# Patient Record
Sex: FEMALE | Race: WHITE | Employment: UNEMPLOYED | URBAN - METROPOLITAN AREA
[De-identification: names, ages, dates, MRNs, and addresses within clinical notes are randomized per-mention and may not be internally consistent; named-entity substitution may affect disease eponyms.]

---

## 2017-02-13 PROBLEM — J45.901 ASTHMA EXACERBATION WITH COPD (CHRONIC OBSTRUCTIVE PULMONARY DISEASE) (HCC): Status: ACTIVE | Noted: 2017-02-13

## 2017-02-13 PROBLEM — J44.1 ASTHMA EXACERBATION WITH COPD (CHRONIC OBSTRUCTIVE PULMONARY DISEASE) (HCC): Status: ACTIVE | Noted: 2017-02-13

## 2017-02-13 PROBLEM — J10.1 INFLUENZA A: Status: ACTIVE | Noted: 2017-02-13

## 2017-02-22 PROBLEM — I10 ESSENTIAL HYPERTENSION: Status: ACTIVE | Noted: 2017-02-22

## 2017-02-22 PROBLEM — M54.2 CHRONIC NECK PAIN: Status: ACTIVE | Noted: 2017-02-22

## 2017-02-22 PROBLEM — Z72.0 TOBACCO USE: Status: ACTIVE | Noted: 2017-02-22

## 2017-02-22 PROBLEM — G43.909 MIGRAINE WITHOUT STATUS MIGRAINOSUS, NOT INTRACTABLE: Status: ACTIVE | Noted: 2017-02-22

## 2017-02-22 PROBLEM — G89.29 CHRONIC NECK PAIN: Status: ACTIVE | Noted: 2017-02-22

## 2017-02-22 PROBLEM — K21.9 GASTROESOPHAGEAL REFLUX DISEASE: Status: ACTIVE | Noted: 2017-02-22

## 2017-04-27 PROBLEM — E55.9 VITAMIN D DEFICIENCY: Status: ACTIVE | Noted: 2017-04-27

## 2017-04-27 PROBLEM — E61.1 IRON DEFICIENCY: Status: ACTIVE | Noted: 2017-04-27

## 2017-05-09 PROBLEM — K58.1 IRRITABLE BOWEL SYNDROME WITH CONSTIPATION: Status: ACTIVE | Noted: 2017-05-09

## 2017-08-22 PROBLEM — E89.41 HOT FLASHES DUE TO SURGICAL MENOPAUSE: Status: ACTIVE | Noted: 2017-08-22

## 2017-11-09 PROBLEM — G90.A POTS (POSTURAL ORTHOSTATIC TACHYCARDIA SYNDROME): Status: ACTIVE | Noted: 2017-11-09

## 2017-11-15 PROBLEM — R19.7 DIARRHEA: Status: ACTIVE | Noted: 2017-11-15

## 2017-11-15 PROBLEM — Z72.0 TOBACCO USE: Status: ACTIVE | Noted: 2017-11-15

## 2017-11-15 PROBLEM — R11.2 NAUSEA & VOMITING: Status: ACTIVE | Noted: 2017-11-15

## 2017-11-15 PROBLEM — G90.A POTS (POSTURAL ORTHOSTATIC TACHYCARDIA SYNDROME): Status: ACTIVE | Noted: 2017-11-15

## 2018-03-14 ENCOUNTER — TELEPHONE (OUTPATIENT)
Dept: FAMILY MEDICINE CLINIC | Age: 43
End: 2018-03-14

## 2018-03-29 ENCOUNTER — TELEPHONE (OUTPATIENT)
Dept: FAMILY MEDICINE CLINIC | Age: 43
End: 2018-03-29

## 2018-03-29 DIAGNOSIS — G43.909 MIGRAINE WITHOUT STATUS MIGRAINOSUS, NOT INTRACTABLE, UNSPECIFIED MIGRAINE TYPE: ICD-10-CM

## 2018-03-29 DIAGNOSIS — G43.909 MIGRAINE WITHOUT STATUS MIGRAINOSUS, NOT INTRACTABLE, UNSPECIFIED MIGRAINE TYPE: Primary | ICD-10-CM

## 2018-03-29 RX ORDER — SUMATRIPTAN 25 MG/1
25 TABLET, FILM COATED ORAL
Qty: 8 TABLET | Refills: 1 | Status: SHIPPED | OUTPATIENT
Start: 2018-03-29 | End: 2018-10-25 | Stop reason: ALTCHOICE

## 2018-04-18 ENCOUNTER — OFFICE VISIT (OUTPATIENT)
Dept: FAMILY MEDICINE CLINIC | Age: 43
End: 2018-04-18
Payer: COMMERCIAL

## 2018-04-18 VITALS
HEIGHT: 66 IN | HEART RATE: 149 BPM | WEIGHT: 183 LBS | OXYGEN SATURATION: 97 % | SYSTOLIC BLOOD PRESSURE: 120 MMHG | TEMPERATURE: 98.7 F | BODY MASS INDEX: 29.41 KG/M2 | DIASTOLIC BLOOD PRESSURE: 86 MMHG

## 2018-04-18 DIAGNOSIS — M54.42 CHRONIC LOW BACK PAIN WITH BILATERAL SCIATICA, UNSPECIFIED BACK PAIN LATERALITY: Chronic | ICD-10-CM

## 2018-04-18 DIAGNOSIS — M54.41 CHRONIC LOW BACK PAIN WITH BILATERAL SCIATICA, UNSPECIFIED BACK PAIN LATERALITY: Chronic | ICD-10-CM

## 2018-04-18 DIAGNOSIS — E55.9 VITAMIN D DEFICIENCY: ICD-10-CM

## 2018-04-18 DIAGNOSIS — R09.81 SINUS CONGESTION: ICD-10-CM

## 2018-04-18 DIAGNOSIS — G90.A POTS (POSTURAL ORTHOSTATIC TACHYCARDIA SYNDROME): Primary | ICD-10-CM

## 2018-04-18 DIAGNOSIS — G89.29 CHRONIC LOW BACK PAIN WITH BILATERAL SCIATICA, UNSPECIFIED BACK PAIN LATERALITY: Chronic | ICD-10-CM

## 2018-04-18 DIAGNOSIS — Z13.1 DIABETES MELLITUS SCREENING: ICD-10-CM

## 2018-04-18 DIAGNOSIS — G43.909 MIGRAINE WITHOUT STATUS MIGRAINOSUS, NOT INTRACTABLE, UNSPECIFIED MIGRAINE TYPE: ICD-10-CM

## 2018-04-18 PROCEDURE — G8417 CALC BMI ABV UP PARAM F/U: HCPCS | Performed by: FAMILY MEDICINE

## 2018-04-18 PROCEDURE — 99214 OFFICE O/P EST MOD 30 MIN: CPT | Performed by: FAMILY MEDICINE

## 2018-04-18 PROCEDURE — G8427 DOCREV CUR MEDS BY ELIG CLIN: HCPCS | Performed by: FAMILY MEDICINE

## 2018-04-18 PROCEDURE — 4004F PT TOBACCO SCREEN RCVD TLK: CPT | Performed by: FAMILY MEDICINE

## 2018-04-18 RX ORDER — FLUTICASONE PROPIONATE 50 MCG
SPRAY, SUSPENSION (ML) NASAL
Qty: 1 BOTTLE | Refills: 1 | Status: SHIPPED | OUTPATIENT
Start: 2018-04-18 | End: 2019-06-13

## 2018-04-18 RX ORDER — PROPRANOLOL HCL 60 MG
60 CAPSULE, EXTENDED RELEASE 24HR ORAL DAILY
Qty: 30 CAPSULE | Refills: 3 | Status: SHIPPED | OUTPATIENT
Start: 2018-04-18 | End: 2018-09-06 | Stop reason: SDUPTHER

## 2018-04-23 ENCOUNTER — TELEPHONE (OUTPATIENT)
Dept: FAMILY MEDICINE CLINIC | Age: 43
End: 2018-04-23

## 2018-05-25 ENCOUNTER — TELEPHONE (OUTPATIENT)
Dept: FAMILY MEDICINE CLINIC | Age: 43
End: 2018-05-25

## 2018-07-05 ENCOUNTER — TELEPHONE (OUTPATIENT)
Dept: FAMILY MEDICINE CLINIC | Age: 43
End: 2018-07-05

## 2018-08-16 ENCOUNTER — TELEPHONE (OUTPATIENT)
Dept: FAMILY MEDICINE CLINIC | Age: 43
End: 2018-08-16

## 2018-09-06 DIAGNOSIS — G90.A POTS (POSTURAL ORTHOSTATIC TACHYCARDIA SYNDROME): ICD-10-CM

## 2018-09-06 RX ORDER — PROPRANOLOL HCL 60 MG
60 CAPSULE, EXTENDED RELEASE 24HR ORAL DAILY
Qty: 90 CAPSULE | Refills: 1 | Status: SHIPPED | OUTPATIENT
Start: 2018-09-06 | End: 2019-10-02 | Stop reason: SDUPTHER

## 2018-09-26 PROBLEM — J10.1 INFLUENZA A: Status: RESOLVED | Noted: 2017-02-13 | Resolved: 2018-09-26

## 2018-10-25 ENCOUNTER — HOSPITAL ENCOUNTER (OUTPATIENT)
Age: 43
Discharge: HOME | End: 2018-10-27
Payer: COMMERCIAL

## 2018-10-25 ENCOUNTER — OFFICE VISIT (OUTPATIENT)
Dept: FAMILY MEDICINE CLINIC | Age: 43
End: 2018-10-25
Payer: COMMERCIAL

## 2018-10-25 VITALS
DIASTOLIC BLOOD PRESSURE: 90 MMHG | TEMPERATURE: 98.1 F | HEIGHT: 66 IN | BODY MASS INDEX: 29.25 KG/M2 | SYSTOLIC BLOOD PRESSURE: 126 MMHG | HEART RATE: 119 BPM | OXYGEN SATURATION: 98 % | WEIGHT: 182 LBS

## 2018-10-25 DIAGNOSIS — R79.0 LOW BLOOD MAGNESIUM LEVEL: ICD-10-CM

## 2018-10-25 DIAGNOSIS — Z86.69 HX OF MIGRAINES: ICD-10-CM

## 2018-10-25 DIAGNOSIS — Z86.2 HISTORY OF IRON DEFICIENCY ANEMIA: ICD-10-CM

## 2018-10-25 DIAGNOSIS — Z72.0 TOBACCO USE: ICD-10-CM

## 2018-10-25 DIAGNOSIS — R53.82 CHRONIC FATIGUE: Primary | ICD-10-CM

## 2018-10-25 DIAGNOSIS — R53.82 CHRONIC FATIGUE: ICD-10-CM

## 2018-10-25 DIAGNOSIS — Z13.31 POSITIVE DEPRESSION SCREENING: ICD-10-CM

## 2018-10-25 DIAGNOSIS — E55.9 VITAMIN D DEFICIENCY: ICD-10-CM

## 2018-10-25 DIAGNOSIS — Z13.1 DIABETES MELLITUS SCREENING: ICD-10-CM

## 2018-10-25 DIAGNOSIS — R13.10 DYSPHAGIA, UNSPECIFIED TYPE: ICD-10-CM

## 2018-10-25 LAB
ALBUMIN SERPL-MCNC: 4.1 G/DL (ref 3.5–5.2)
ALP BLD-CCNC: 57 U/L (ref 35–104)
ALT SERPL-CCNC: 22 U/L (ref 0–32)
ANION GAP SERPL CALCULATED.3IONS-SCNC: 12 MMOL/L (ref 7–16)
AST SERPL-CCNC: 19 U/L (ref 0–31)
BILIRUB SERPL-MCNC: <0.2 MG/DL (ref 0–1.2)
BILIRUBIN DIRECT: <0.2 MG/DL (ref 0–0.3)
BILIRUBIN, INDIRECT: NORMAL MG/DL (ref 0–1)
BUN BLDV-MCNC: 10 MG/DL (ref 6–20)
CALCIUM SERPL-MCNC: 9.2 MG/DL (ref 8.6–10.2)
CHLORIDE BLD-SCNC: 110 MMOL/L (ref 98–107)
CO2: 20 MMOL/L (ref 22–29)
CREAT SERPL-MCNC: 0.8 MG/DL (ref 0.5–1)
FERRITIN: 60 NG/ML
FOLATE: <2 NG/ML (ref 4.8–24.2)
GFR AFRICAN AMERICAN: >60
GFR NON-AFRICAN AMERICAN: >60 ML/MIN/1.73
GLUCOSE BLD-MCNC: 91 MG/DL (ref 74–109)
HBA1C MFR BLD: 5.4 % (ref 4–5.6)
HCT VFR BLD CALC: 46.5 % (ref 34–48)
HEMOGLOBIN: 15.2 G/DL (ref 11.5–15.5)
IRON SATURATION: 31 % (ref 15–50)
IRON: 98 MCG/DL (ref 37–145)
MAGNESIUM: 2.2 MG/DL (ref 1.6–2.6)
MCH RBC QN AUTO: 32.5 PG (ref 26–35)
MCHC RBC AUTO-ENTMCNC: 32.7 % (ref 32–34.5)
MCV RBC AUTO: 99.6 FL (ref 80–99.9)
PDW BLD-RTO: 13.6 FL (ref 11.5–15)
PLATELET # BLD: 335 E9/L (ref 130–450)
PMV BLD AUTO: 10.9 FL (ref 7–12)
POTASSIUM SERPL-SCNC: 3.6 MMOL/L (ref 3.5–5)
RBC # BLD: 4.67 E12/L (ref 3.5–5.5)
SODIUM BLD-SCNC: 142 MMOL/L (ref 132–146)
TOTAL IRON BINDING CAPACITY: 321 MCG/DL (ref 250–450)
TOTAL PROTEIN: 6.7 G/DL (ref 6.4–8.3)
TSH SERPL DL<=0.05 MIU/L-ACNC: 1.38 UIU/ML (ref 0.27–4.2)
VITAMIN B-12: 682 PG/ML (ref 211–946)
VITAMIN D 25-HYDROXY: 15 NG/ML (ref 30–100)
WBC # BLD: 6.1 E9/L (ref 4.5–11.5)

## 2018-10-25 PROCEDURE — 83735 ASSAY OF MAGNESIUM: CPT

## 2018-10-25 PROCEDURE — 85027 COMPLETE CBC AUTOMATED: CPT

## 2018-10-25 PROCEDURE — G8427 DOCREV CUR MEDS BY ELIG CLIN: HCPCS | Performed by: FAMILY MEDICINE

## 2018-10-25 PROCEDURE — G8417 CALC BMI ABV UP PARAM F/U: HCPCS | Performed by: FAMILY MEDICINE

## 2018-10-25 PROCEDURE — 80076 HEPATIC FUNCTION PANEL: CPT

## 2018-10-25 PROCEDURE — 84443 ASSAY THYROID STIM HORMONE: CPT

## 2018-10-25 PROCEDURE — 82607 VITAMIN B-12: CPT

## 2018-10-25 PROCEDURE — 82746 ASSAY OF FOLIC ACID SERUM: CPT

## 2018-10-25 PROCEDURE — 82728 ASSAY OF FERRITIN: CPT

## 2018-10-25 PROCEDURE — 83540 ASSAY OF IRON: CPT

## 2018-10-25 PROCEDURE — 80048 BASIC METABOLIC PNL TOTAL CA: CPT

## 2018-10-25 PROCEDURE — 83550 IRON BINDING TEST: CPT

## 2018-10-25 PROCEDURE — 36415 COLL VENOUS BLD VENIPUNCTURE: CPT | Performed by: FAMILY MEDICINE

## 2018-10-25 PROCEDURE — G8484 FLU IMMUNIZE NO ADMIN: HCPCS | Performed by: FAMILY MEDICINE

## 2018-10-25 PROCEDURE — 4004F PT TOBACCO SCREEN RCVD TLK: CPT | Performed by: FAMILY MEDICINE

## 2018-10-25 PROCEDURE — 96160 PT-FOCUSED HLTH RISK ASSMT: CPT | Performed by: FAMILY MEDICINE

## 2018-10-25 PROCEDURE — 99214 OFFICE O/P EST MOD 30 MIN: CPT | Performed by: FAMILY MEDICINE

## 2018-10-25 PROCEDURE — 83036 HEMOGLOBIN GLYCOSYLATED A1C: CPT

## 2018-10-25 PROCEDURE — G8431 POS CLIN DEPRES SCRN F/U DOC: HCPCS | Performed by: FAMILY MEDICINE

## 2018-10-25 PROCEDURE — 82306 VITAMIN D 25 HYDROXY: CPT

## 2018-10-25 RX ORDER — DEXLANSOPRAZOLE 60 MG/1
60 CAPSULE, DELAYED RELEASE ORAL DAILY
COMMUNITY
End: 2018-12-17 | Stop reason: SDUPTHER

## 2018-10-25 RX ORDER — NARATRIPTAN 2.5 MG/1
2.5 TABLET ORAL
COMMUNITY
End: 2019-01-06 | Stop reason: ALTCHOICE

## 2018-10-25 RX ORDER — ALENDRONATE SODIUM 70 MG/1
70 TABLET ORAL
COMMUNITY
End: 2021-06-07

## 2018-10-25 RX ORDER — NICOTINE 21 MG/24HR
1 PATCH, TRANSDERMAL 24 HOURS TRANSDERMAL EVERY 24 HOURS
Qty: 30 PATCH | Refills: 1 | Status: SHIPPED | OUTPATIENT
Start: 2018-10-25 | End: 2019-06-13

## 2018-10-25 ASSESSMENT — PATIENT HEALTH QUESTIONNAIRE - PHQ9
4. FEELING TIRED OR HAVING LITTLE ENERGY: 3
3. TROUBLE FALLING OR STAYING ASLEEP: 3
8. MOVING OR SPEAKING SO SLOWLY THAT OTHER PEOPLE COULD HAVE NOTICED. OR THE OPPOSITE, BEING SO FIGETY OR RESTLESS THAT YOU HAVE BEEN MOVING AROUND A LOT MORE THAN USUAL: 0
6. FEELING BAD ABOUT YOURSELF - OR THAT YOU ARE A FAILURE OR HAVE LET YOURSELF OR YOUR FAMILY DOWN: 1
2. FEELING DOWN, DEPRESSED OR HOPELESS: 2
9. THOUGHTS THAT YOU WOULD BE BETTER OFF DEAD, OR OF HURTING YOURSELF: 0
5. POOR APPETITE OR OVEREATING: 2
7. TROUBLE CONCENTRATING ON THINGS, SUCH AS READING THE NEWSPAPER OR WATCHING TELEVISION: 2
10. IF YOU CHECKED OFF ANY PROBLEMS, HOW DIFFICULT HAVE THESE PROBLEMS MADE IT FOR YOU TO DO YOUR WORK, TAKE CARE OF THINGS AT HOME, OR GET ALONG WITH OTHER PEOPLE: 2
SUM OF ALL RESPONSES TO PHQ QUESTIONS 1-9: 15
SUM OF ALL RESPONSES TO PHQ QUESTIONS 1-9: 15
1. LITTLE INTEREST OR PLEASURE IN DOING THINGS: 2
SUM OF ALL RESPONSES TO PHQ9 QUESTIONS 1 & 2: 4

## 2018-10-25 NOTE — PROGRESS NOTES
2/13/2017    C. difficile colitis     Endometriosis     Essential hypertension 2/22/2017    Fibromyalgia     GERD (gastroesophageal reflux disease)     Influenza A 2/13/2017    Interstitial lung disease (HCC)     Malabsorption syndrome     Migraine     Narcotic abuse (HCC)     Neuromuscular disorder (HCC)     Ovarian cyst     Pancreatitis     POTS (postural orthostatic tachycardia syndrome)     Psychiatric problem     [facility] arthritis [facility] [address]     Seizures ([facility] [address]     due to Ultram per pt    Systemic candidiasis [facility])        Past Surgical History:        Procedure Laterality Date    ABDOMEN SURGERY  3/5/12    endometreosis    APPENDECTOMY      BRONCHOSCOPY  11/01/2016    BRONCHOSCOPY  12/14/2016    CHOLECYSTECTOMY  02/25/2016    lap    COLONOSCOPY      ENDOMETRIAL ABLATION      ENDOSCOPY, COLON, DIAGNOSTIC      HYSTERECTOMY      SMALL INTESTINE SURGERY      related to endometriosis    MARCELINO AND BSO      TONSILLECTOMY         Allergies:  Tramadol; Morphine; Topiramate; Codeine; Demerol; Influenza vaccines; Nsaids; Prochlorperazine edisylate; Trazodone and nefazodone; Casein; Eggs or egg-derived products; Gluten meal; Peanuts [peanut oil]; and Whey    Social History:   TOBACCO:   reports that she has been smoking. She started smoking about 14 years ago. She has been smoking about 0.50 packs per day. She has never used smokeless tobacco.  ETOH:   reports that she does not drink alcohol.       Family History:   Family History   Problem Relation Age of Onset    High Blood Pressure Father     High Cholesterol Father     High Blood Pressure Mother     No Known Problems Sister        REVIEW OF SYSTEMS:     Review of Systems - General ROS: negative for - fever  +fatigue, temp ranges  F   Psychological ROS: positive for - depression  negative for - suicidal ideation  ENT ROS: negative for - nasal congestion, nasal discharge or sinus pain  Hematological and Lymphatic ROS: she has frequent bruising - states this is not new for her   Has not returned to the hematologist for follow-up   Respiratory ROS: no cough, shortness of breath, or wheezing  Cardiovascular ROS: no chest pain or dyspnea on exertion  Gastrointestinal ROS: no abdominal pain, change in bowel habits, or black or bloody stools  positive for - swallowing difficulty/pain  Genito-Urinary ROS: no dysuria, trouble voiding, or hematuria  Neurological ROS: +chronic migraines         Physical Exam   BP (!) 126/90 (Site: Left Upper Arm, Position: Sitting, Cuff Size: Large Adult)   Pulse 119   Temp 98.1 °F (36.7 °C) (Oral)   Ht 5' 6\" (1.676 m)   Wt 182 lb (82.6 kg)   SpO2 98%   BMI 29.38 kg/m²   Physical Examination: General appearance - alert, pleasant, no distress   Mental status -affect appropriate   Head- normocephalic   Neck - no palpable adenopathy, no tenderness to palpation   Eyes - pupils equal and reactive  Ears - no injection of the canals or the TM's   Mouth-mucosa was moist, no lesions of the mucosa, no pharyngeal injection or exudates    Heart-regular rate and rhythm with no ectopy  Lungs-clear to auscultation b/l with no wheeze, no rales and no rhonchi    Abdomen -soft with subjective tenderness in the upper abdomen -(she feels may be due previous surgeries and is not new)    No palpable masses   Extremities -no edema     Mandi was seen today for an annual exam and fatigue. Diagnoses and all orders for this visit:    Chronic fatigue  -     Basic Metabolic Panel; Future  -     Hepatic Function Panel; Future  -     TSH without Reflex; Future  -     Vitamin B12 & Folate; Future    Dysphagia, unspecified type  -     FL MODIFIED BARIUM SWALLOW W VIDEO; Future    Positive depression screening  -     Positive Screen for Clinical Depression with a Documented Follow-up Plan   She denied any suicidal ideation or plan and has a counselor she sees for treatment     Tobacco use  -     nicotine (NICODERM CQ) 14 MG/24HR;  Place 1 patch

## 2018-10-26 DIAGNOSIS — E53.8 FOLATE DEFICIENCY: Primary | ICD-10-CM

## 2018-10-26 RX ORDER — FOLIC ACID 1 MG/1
1 TABLET ORAL DAILY
Qty: 30 TABLET | Refills: 3 | Status: SHIPPED | OUTPATIENT
Start: 2018-10-26 | End: 2019-07-02 | Stop reason: SDUPTHER

## 2018-12-06 ENCOUNTER — TELEPHONE (OUTPATIENT)
Dept: FAMILY MEDICINE CLINIC | Age: 43
End: 2018-12-06

## 2018-12-06 DIAGNOSIS — M79.7 FIBROMYALGIA: Primary | ICD-10-CM

## 2018-12-17 ENCOUNTER — TELEPHONE (OUTPATIENT)
Dept: FAMILY MEDICINE CLINIC | Age: 43
End: 2018-12-17

## 2018-12-17 DIAGNOSIS — K21.9 GASTROESOPHAGEAL REFLUX DISEASE, ESOPHAGITIS PRESENCE NOT SPECIFIED: Primary | Chronic | ICD-10-CM

## 2018-12-17 RX ORDER — DEXLANSOPRAZOLE 60 MG/1
60 CAPSULE, DELAYED RELEASE ORAL DAILY
Qty: 30 CAPSULE | Refills: 1 | Status: SHIPPED | OUTPATIENT
Start: 2018-12-17

## 2018-12-18 ENCOUNTER — HOSPITAL ENCOUNTER (EMERGENCY)
Age: 43
Discharge: HOME | End: 2018-12-18
Attending: EMERGENCY MEDICINE
Payer: COMMERCIAL

## 2018-12-18 ENCOUNTER — APPOINTMENT (OUTPATIENT)
Dept: CT IMAGING | Age: 43
End: 2018-12-18
Payer: COMMERCIAL

## 2018-12-18 VITALS
OXYGEN SATURATION: 93 % | HEART RATE: 83 BPM | TEMPERATURE: 98.5 F | WEIGHT: 175 LBS | RESPIRATION RATE: 18 BRPM | HEIGHT: 65 IN | DIASTOLIC BLOOD PRESSURE: 74 MMHG | SYSTOLIC BLOOD PRESSURE: 116 MMHG | BODY MASS INDEX: 29.16 KG/M2

## 2018-12-18 DIAGNOSIS — K56.7 ILEUS OF UNSPECIFIED TYPE (HCC): ICD-10-CM

## 2018-12-18 DIAGNOSIS — E86.0 DEHYDRATION: ICD-10-CM

## 2018-12-18 DIAGNOSIS — R10.11 RIGHT UPPER QUADRANT ABDOMINAL PAIN: Primary | ICD-10-CM

## 2018-12-18 LAB
ALBUMIN SERPL-MCNC: 4 G/DL (ref 3.5–5.2)
ALP BLD-CCNC: 56 U/L (ref 35–104)
ALT SERPL-CCNC: 18 U/L (ref 0–32)
ANION GAP SERPL CALCULATED.3IONS-SCNC: 13 MMOL/L (ref 7–16)
AST SERPL-CCNC: 20 U/L (ref 0–31)
BASOPHILS ABSOLUTE: 0.1 E9/L (ref 0–0.2)
BASOPHILS RELATIVE PERCENT: 1.7 % (ref 0–2)
BILIRUB SERPL-MCNC: <0.2 MG/DL (ref 0–1.2)
BILIRUBIN DIRECT: <0.2 MG/DL (ref 0–0.3)
BILIRUBIN URINE: NEGATIVE
BILIRUBIN, INDIRECT: ABNORMAL MG/DL (ref 0–1)
BLOOD, URINE: NEGATIVE
BUN BLDV-MCNC: 11 MG/DL (ref 6–20)
CALCIUM SERPL-MCNC: 8.5 MG/DL (ref 8.6–10.2)
CHLORIDE BLD-SCNC: 105 MMOL/L (ref 98–107)
CLARITY: CLEAR
CO2: 20 MMOL/L (ref 22–29)
COLOR: YELLOW
CREAT SERPL-MCNC: 0.7 MG/DL (ref 0.5–1)
EOSINOPHILS ABSOLUTE: 0.31 E9/L (ref 0.05–0.5)
EOSINOPHILS RELATIVE PERCENT: 5.3 % (ref 0–6)
GFR AFRICAN AMERICAN: >60
GFR NON-AFRICAN AMERICAN: >60 ML/MIN/1.73
GLUCOSE BLD-MCNC: 90 MG/DL (ref 74–99)
GLUCOSE URINE: NEGATIVE MG/DL
HCT VFR BLD CALC: 46.9 % (ref 34–48)
HEMOGLOBIN: 15.9 G/DL (ref 11.5–15.5)
IMMATURE GRANULOCYTES #: 0.02 E9/L
IMMATURE GRANULOCYTES %: 0.3 % (ref 0–5)
INR BLD: 0.8
KETONES, URINE: ABNORMAL MG/DL
LEUKOCYTE ESTERASE, URINE: NEGATIVE
LIPASE: 58 U/L (ref 13–60)
LYMPHOCYTES ABSOLUTE: 2.95 E9/L (ref 1.5–4)
LYMPHOCYTES RELATIVE PERCENT: 50.3 % (ref 20–42)
MCH RBC QN AUTO: 32.9 PG (ref 26–35)
MCHC RBC AUTO-ENTMCNC: 33.9 % (ref 32–34.5)
MCV RBC AUTO: 97.1 FL (ref 80–99.9)
MONOCYTES ABSOLUTE: 0.57 E9/L (ref 0.1–0.95)
MONOCYTES RELATIVE PERCENT: 9.7 % (ref 2–12)
NEUTROPHILS ABSOLUTE: 1.91 E9/L (ref 1.8–7.3)
NEUTROPHILS RELATIVE PERCENT: 32.7 % (ref 43–80)
NITRITE, URINE: NEGATIVE
PDW BLD-RTO: 13.3 FL (ref 11.5–15)
PH UA: 6 (ref 5–9)
PLATELET # BLD: 333 E9/L (ref 130–450)
PMV BLD AUTO: 10.1 FL (ref 7–12)
POTASSIUM SERPL-SCNC: 3.9 MMOL/L (ref 3.5–5)
PROTEIN UA: NEGATIVE MG/DL
PROTHROMBIN TIME: 9.7 SEC (ref 9.3–12.4)
RBC # BLD: 4.83 E12/L (ref 3.5–5.5)
SODIUM BLD-SCNC: 138 MMOL/L (ref 132–146)
SPECIFIC GRAVITY UA: 1.02 (ref 1–1.03)
TOTAL PROTEIN: 6 G/DL (ref 6.4–8.3)
UROBILINOGEN, URINE: 0.2 E.U./DL
WBC # BLD: 5.9 E9/L (ref 4.5–11.5)

## 2018-12-18 PROCEDURE — 96376 TX/PRO/DX INJ SAME DRUG ADON: CPT

## 2018-12-18 PROCEDURE — 80076 HEPATIC FUNCTION PANEL: CPT

## 2018-12-18 PROCEDURE — 96374 THER/PROPH/DIAG INJ IV PUSH: CPT

## 2018-12-18 PROCEDURE — 85610 PROTHROMBIN TIME: CPT

## 2018-12-18 PROCEDURE — 81003 URINALYSIS AUTO W/O SCOPE: CPT

## 2018-12-18 PROCEDURE — 87088 URINE BACTERIA CULTURE: CPT

## 2018-12-18 PROCEDURE — 2580000003 HC RX 258: Performed by: STUDENT IN AN ORGANIZED HEALTH CARE EDUCATION/TRAINING PROGRAM

## 2018-12-18 PROCEDURE — 74177 CT ABD & PELVIS W/CONTRAST: CPT

## 2018-12-18 PROCEDURE — 96375 TX/PRO/DX INJ NEW DRUG ADDON: CPT

## 2018-12-18 PROCEDURE — 99284 EMERGENCY DEPT VISIT MOD MDM: CPT

## 2018-12-18 PROCEDURE — 6360000004 HC RX CONTRAST MEDICATION: Performed by: RADIOLOGY

## 2018-12-18 PROCEDURE — 80048 BASIC METABOLIC PNL TOTAL CA: CPT

## 2018-12-18 PROCEDURE — 83690 ASSAY OF LIPASE: CPT

## 2018-12-18 PROCEDURE — 96361 HYDRATE IV INFUSION ADD-ON: CPT

## 2018-12-18 PROCEDURE — 6360000002 HC RX W HCPCS: Performed by: STUDENT IN AN ORGANIZED HEALTH CARE EDUCATION/TRAINING PROGRAM

## 2018-12-18 PROCEDURE — 85025 COMPLETE CBC W/AUTO DIFF WBC: CPT

## 2018-12-18 RX ORDER — 0.9 % SODIUM CHLORIDE 0.9 %
1000 INTRAVENOUS SOLUTION INTRAVENOUS ONCE
Status: COMPLETED | OUTPATIENT
Start: 2018-12-18 | End: 2018-12-18

## 2018-12-18 RX ORDER — PROMETHAZINE HYDROCHLORIDE 25 MG/1
25 TABLET ORAL EVERY 6 HOURS PRN
Qty: 30 TABLET | Refills: 0 | Status: SHIPPED | OUTPATIENT
Start: 2018-12-18 | End: 2018-12-25

## 2018-12-18 RX ORDER — ONDANSETRON 2 MG/ML
4 INJECTION INTRAMUSCULAR; INTRAVENOUS ONCE
Status: COMPLETED | OUTPATIENT
Start: 2018-12-18 | End: 2018-12-18

## 2018-12-18 RX ORDER — FENTANYL CITRATE 50 UG/ML
25 INJECTION, SOLUTION INTRAMUSCULAR; INTRAVENOUS ONCE
Status: COMPLETED | OUTPATIENT
Start: 2018-12-18 | End: 2018-12-18

## 2018-12-18 RX ADMIN — FENTANYL CITRATE 25 MCG: 50 INJECTION, SOLUTION INTRAMUSCULAR; INTRAVENOUS at 15:50

## 2018-12-18 RX ADMIN — ONDANSETRON 4 MG: 2 INJECTION INTRAMUSCULAR; INTRAVENOUS at 15:35

## 2018-12-18 RX ADMIN — ONDANSETRON 4 MG: 2 INJECTION INTRAMUSCULAR; INTRAVENOUS at 18:45

## 2018-12-18 RX ADMIN — IOPAMIDOL 110 ML: 755 INJECTION, SOLUTION INTRAVENOUS at 17:22

## 2018-12-18 RX ADMIN — SODIUM CHLORIDE 1000 ML: 9 INJECTION, SOLUTION INTRAVENOUS at 15:35

## 2018-12-18 ASSESSMENT — PAIN DESCRIPTION - PAIN TYPE
TYPE: ACUTE PAIN
TYPE: ACUTE PAIN

## 2018-12-18 ASSESSMENT — ENCOUNTER SYMPTOMS
CONSTIPATION: 0
NAUSEA: 1
COUGH: 0
VOICE CHANGE: 0
TROUBLE SWALLOWING: 0
SORE THROAT: 0
ABDOMINAL PAIN: 1
DIARRHEA: 0
HEMATOCHEZIA: 0
VOMITING: 1
COLOR CHANGE: 0
BELCHING: 0
PHOTOPHOBIA: 0
HEMATEMESIS: 0
FLATUS: 0
SHORTNESS OF BREATH: 0

## 2018-12-18 ASSESSMENT — PAIN SCALES - GENERAL
PAINLEVEL_OUTOF10: 5
PAINLEVEL_OUTOF10: 9
PAINLEVEL_OUTOF10: 9

## 2018-12-18 ASSESSMENT — PAIN DESCRIPTION - LOCATION
LOCATION: ABDOMEN
LOCATION: ABDOMEN

## 2018-12-18 ASSESSMENT — PAIN DESCRIPTION - ORIENTATION: ORIENTATION: RIGHT;UPPER

## 2018-12-18 ASSESSMENT — PAIN DESCRIPTION - DESCRIPTORS: DESCRIPTORS: CONSTANT;STABBING

## 2018-12-18 ASSESSMENT — PAIN DESCRIPTION - FREQUENCY: FREQUENCY: CONTINUOUS

## 2018-12-18 NOTE — ED NOTES
Radiology Procedure Waiver   Name: Latanya Cervantes  : 1975  MRN: [id number]    Date:  18    Time: 4:50 PM    Benefits of immediately proceeding with Radiology exam(s) without pre-testing outweigh the risks or are not indicated as specified below and therefore the following is/are being waived:    [x] Pregnancy test   [] Patients LMP on-time and regular.   [] Patient had Tubal Ligation or has other Contraception Device. [] Patient  is Menopausal or Premenarcheal.    [x] Patient had Full or Partial Hysterectomy. [] Protocol for Iodine allergy    [] MRI Questionnaire     [] BUN/Creatinine   [] Patient age w/no hx of renal dysfunction. [] Patient on Dialysis. [] Recent Normal Labs.   Electronically signed by Julissa Valiente DO on 18 at 4:50 PM               Julissa Calixto DO  Resident  [id number]

## 2018-12-18 NOTE — ED NOTES
Bed: [place] [address]  Expected date:   Expected time:   Means of arrival:   Comments:  Triage - Sherilyn Severin.  [facility]  12/18/18 [id number]

## 2018-12-18 NOTE — ED PROVIDER NOTES
for activity change, appetite change and fatigue. Negative for chills, diaphoresis and fever. HENT: Negative for congestion, sore throat, trouble swallowing and voice change. Eyes: Negative for photophobia and visual disturbance. Respiratory: Negative for cough and shortness of breath. Cardiovascular: Negative for chest pain. Gastrointestinal: Positive for abdominal pain, anorexia, nausea and vomiting. Negative for constipation, diarrhea, flatus, hematemesis, hematochezia and melena. Genitourinary: Negative for decreased urine volume, difficulty urinating, dysuria, flank pain, frequency, hematuria and urgency. Musculoskeletal: Negative for myalgias, neck pain and neck stiffness. Skin: Negative for color change, pallor, rash and wound. Neurological: Positive for weakness. Negative for dizziness, syncope, light-headedness, numbness and headaches. Psychiatric/Behavioral: Negative for confusion. Physical Exam   Constitutional: She is oriented to person, place, and time. She appears well-developed and well-nourished. She appears distressed. HENT:   Head: Normocephalic and atraumatic. Mouth/Throat: Oropharynx is clear and moist. No oropharyngeal exudate. Eyes: Pupils are equal, round, and reactive to light. EOM are normal. Right eye exhibits no discharge. Left eye exhibits no discharge. No scleral icterus. Neck: Normal range of motion. Neck supple. No tracheal deviation present. Cardiovascular: Normal rate, regular rhythm and normal heart sounds. No murmur heard. Pulmonary/Chest: Effort normal and breath sounds normal. No stridor. No respiratory distress. She has no wheezes. She has no rales. Abdominal: Soft. Bowel sounds are normal. She exhibits no distension and no mass. There is no hepatosplenomegaly. There is tenderness in the right upper quadrant and epigastric area. There is guarding.  There is no rigidity, no rebound, no CVA tenderness, no tenderness at McBurney's point

## 2018-12-20 LAB — URINE CULTURE, ROUTINE: NORMAL

## 2018-12-27 ENCOUNTER — TELEPHONE (OUTPATIENT)
Dept: FAMILY MEDICINE CLINIC | Age: 43
End: 2018-12-27

## 2018-12-28 ENCOUNTER — HOSPITAL ENCOUNTER (OUTPATIENT)
Age: 43
Discharge: HOME | End: 2018-12-30
Payer: COMMERCIAL

## 2018-12-28 ENCOUNTER — HOSPITAL ENCOUNTER (OUTPATIENT)
Dept: GENERAL RADIOLOGY | Age: 43
Discharge: HOME | End: 2018-12-30
Payer: COMMERCIAL

## 2018-12-28 DIAGNOSIS — R10.9 ABDOMINAL PAIN, UNSPECIFIED ABDOMINAL LOCATION: ICD-10-CM

## 2018-12-28 PROCEDURE — 74018 RADEX ABDOMEN 1 VIEW: CPT

## 2019-01-06 ENCOUNTER — TELEPHONE (OUTPATIENT)
Dept: FAMILY MEDICINE CLINIC | Age: 44
End: 2019-01-06

## 2019-01-06 DIAGNOSIS — G43.909 MIGRAINE WITHOUT STATUS MIGRAINOSUS, NOT INTRACTABLE, UNSPECIFIED MIGRAINE TYPE: Primary | ICD-10-CM

## 2019-01-06 RX ORDER — NARATRIPTAN 2.5 MG/1
TABLET ORAL
Qty: 9 TABLET | Refills: 0 | Status: SHIPPED | OUTPATIENT
Start: 2019-01-06 | End: 2019-02-27 | Stop reason: SDUPTHER

## 2019-02-05 ENCOUNTER — TELEPHONE (OUTPATIENT)
Dept: FAMILY MEDICINE CLINIC | Age: 44
End: 2019-02-05

## 2019-02-15 ENCOUNTER — TELEPHONE (OUTPATIENT)
Dept: FAMILY MEDICINE CLINIC | Age: 44
End: 2019-02-15

## 2019-02-27 DIAGNOSIS — G43.909 MIGRAINE WITHOUT STATUS MIGRAINOSUS, NOT INTRACTABLE, UNSPECIFIED MIGRAINE TYPE: ICD-10-CM

## 2019-02-27 RX ORDER — NARATRIPTAN 2.5 MG/1
TABLET ORAL
Qty: 9 TABLET | Refills: 0 | Status: SHIPPED | OUTPATIENT
Start: 2019-02-27 | End: 2020-06-12 | Stop reason: ALTCHOICE

## 2019-06-13 ENCOUNTER — OFFICE VISIT (OUTPATIENT)
Dept: RHEUMATOLOGY | Age: 44
End: 2019-06-13
Payer: COMMERCIAL

## 2019-06-13 VITALS
BODY MASS INDEX: 27.59 KG/M2 | OXYGEN SATURATION: 99 % | WEIGHT: 165.6 LBS | HEART RATE: 127 BPM | TEMPERATURE: 97.6 F | DIASTOLIC BLOOD PRESSURE: 82 MMHG | HEIGHT: 65 IN | SYSTOLIC BLOOD PRESSURE: 132 MMHG

## 2019-06-13 DIAGNOSIS — R74.8 ELEVATED LIVER ENZYMES: Primary | ICD-10-CM

## 2019-06-13 DIAGNOSIS — G90.A POTS (POSTURAL ORTHOSTATIC TACHYCARDIA SYNDROME): ICD-10-CM

## 2019-06-13 DIAGNOSIS — M06.09 RHEUMATOID ARTHRITIS OF MULTIPLE SITES WITH NEGATIVE RHEUMATOID FACTOR (HCC): Chronic | ICD-10-CM

## 2019-06-13 DIAGNOSIS — M79.7 FIBROMYALGIA: ICD-10-CM

## 2019-06-13 PROCEDURE — 99214 OFFICE O/P EST MOD 30 MIN: CPT | Performed by: INTERNAL MEDICINE

## 2019-06-13 RX ORDER — PROMETHAZINE HYDROCHLORIDE 25 MG/1
SUPPOSITORY RECTAL
COMMUNITY
Start: 2019-06-02 | End: 2020-06-12 | Stop reason: ALTCHOICE

## 2019-06-13 RX ORDER — LISDEXAMFETAMINE DIMESYLATE 50 MG
CAPSULE ORAL
COMMUNITY
Start: 2019-06-04 | End: 2020-05-27 | Stop reason: SDUPTHER

## 2019-06-13 ASSESSMENT — ROUTINE ASSESSMENT OF PATIENT INDEX DATA (RAPID3)
TOTAL RAPID3 SCORE: 16.5
ON A SCALE OF ONE TO TEN, HOW MUCH PAIN HAVE YOU HAD BECAUSE OF YOUR CONDITION OVER THE PAST WEEK?: 7.5
ON A SCALE OF ONE TO TEN, CONSIDERING ALL THE WAYS IN WHICH ILLNESS AND HEALTH CONDITIONS MAY AFFECT YOU AT THIS TIME, PLEASE INDICATE BELOW HOW YOU ARE DOING:: 9

## 2019-06-13 ASSESSMENT — ENCOUNTER SYMPTOMS
WHEEZING: 0
ABDOMINAL PAIN: 0
DIARRHEA: 0
BACK PAIN: 0
EYE PAIN: 0
EYE ITCHING: 0
PHOTOPHOBIA: 0
EYE REDNESS: 0
SORE THROAT: 0
CONSTIPATION: 0
SHORTNESS OF BREATH: 0
BLOOD IN STOOL: 0
VOMITING: 0
COUGH: 0
CHEST TIGHTNESS: 0

## 2019-06-13 NOTE — PROGRESS NOTES
19    Name: Lidia Dodd  : 1975  Age: 41 y.o. Sex: female    Patient is seen at the request of Ze Richardson DO    Patient presents for a rheumatology consultation regarding for follow up. Chief Complaint   Patient presents with    Joint Pain    Fatigue     weakness    Abdominal Pain       difuse aches and pain. States that Lidocaine patch helps her. Stopped MTX and HCQ couple of months ago as it was not helping her. [facility]   Labs review with her. CHRISTIANO, rheumatoid factor, anti-CCP antibody, ESR, CRP within reference range. She has high concentration of hemoglobin and significantly elevated AST and ALT. . Of note she does not drink alcohol. She take Tylenol for pain. Vitals:    19 1339   BP: 132/82   Site: Left Upper Arm   Position: Sitting   Pulse: 127   Temp: 97.6 °F (36.4 °C)   TempSrc: Temporal   SpO2: 99%   Weight: 165 lb 9.6 oz (75.1 kg)   Height: 5' 5\" (1.651 m)        Review of Systems   Constitutional: Positive for fatigue. Negative for fever and unexpected weight change. HENT: Negative for mouth sores, nosebleeds and sore throat. Eyes: Negative for photophobia, pain, redness, itching and visual disturbance. Respiratory: Negative for cough, chest tightness, shortness of breath and wheezing. Cardiovascular: Negative for chest pain, palpitations and leg swelling. Gastrointestinal: Negative for abdominal pain, blood in stool, constipation, diarrhea and vomiting. Genitourinary: Negative for dysuria, flank pain, genital sores, hematuria and urgency. Musculoskeletal: Positive for arthralgias and myalgias. Negative for back pain and gait problem. Skin: Negative for rash. Allergic/Immunologic: Negative for environmental allergies and food allergies. Neurological: Positive for dizziness, light-headedness and numbness. Negative for seizures, syncope, weakness and headaches. Hematological: Negative for adenopathy.  Does not bruise/bleed easily. Psychiatric/Behavioral: The patient is not nervous/anxious. Current Outpatient Medications:     Galcanezumab-gnlm (EMGALITY) 120 MG/ML SOAJ, , Disp: , Rfl:     VYVANSE 50 MG capsule, , Disp: , Rfl:     PROMETHEGAN 25 MG suppository, , Disp: , Rfl:     naratriptan (AMERGE) 2.5 MG tablet, One tablet by mouth at the onset of headache, may repeat in 4 hours if needed;  No more than two doses in a 24 hour period, Disp: 9 tablet, Rfl: 0    dexlansoprazole (DEXILANT) 60 MG CPDR delayed release capsule, Take 1 capsule by mouth daily, Disp: 30 capsule, Rfl: 1    folic acid (FOLVITE) 1 MG tablet, Take 1 tablet by mouth daily, Disp: 30 tablet, Rfl: 3    alendronate (FOSAMAX) 70 MG tablet, Take 70 mg by mouth, Disp: , Rfl:     Cholecalciferol 2000 units CAPS, 2 po qd, Disp: , Rfl:     propranolol (INDERAL LA) 60 MG extended release capsule, TAKE 1 CAPSULE BY MOUTH DAILY, Disp: 90 capsule, Rfl: 1    albuterol sulfate HFA (PROVENTIL HFA) 108 (90 Base) MCG/ACT inhaler, Inhale 2 puffs into the lungs every 4 hours as needed for Wheezing, Disp: 1 Inhaler, Rfl: 1    guaiFENesin (MUCINEX) 600 MG extended release tablet, Take 1 tablet by mouth 2 times daily as needed for Congestion, Disp: 30 tablet, Rfl: 1    Electronic Thermometer (ORAL TEMP DIGITAL THERMOMETER) MISC, For use to monitor temperature daily as needed, Disp: 1 each, Rfl: 0    estradiol (ESTRACE) 0.5 MG tablet, Take 0.5 mg by mouth daily, Disp: , Rfl:     dicyclomine (BENTYL) 10 MG capsule, Take 2 capsules by mouth 4 times daily (before meals and nightly) As needed for abdominal pain and cramping., Disp: 15 capsule, Rfl: 0    ranitidine (ZANTAC) 300 MG tablet, Take 300 mg by mouth 2 times daily , Disp: , Rfl:     baclofen (LIORESAL) 20 MG tablet, Take 20 mg by mouth 3 times daily, Disp: , Rfl:     lidocaine (LIDODERM) 5 %, Place 1 patch onto the skin daily 12 hours on, 12 hours off., Disp: , Rfl:     Nebulizer MISC, For use with aerosol solution every 6 hours as needed, Disp: 1 each, Rfl: 0    Respiratory Therapy Supplies (NEBULIZER/ADULT MASK) KIT, For use with aerosol solution every 6 hours as needed, Disp: 1 kit, Rfl: 0    Misc. Devices (WALL GRAB BAR) MISC, Grab bar for bathroom wall, Disp: 1 each, Rfl: 0    Misc. Devices (HAND HELD SHOWER SPRAY) MISC, For use in shower with use of bench, Disp: 1 each, Rfl: 0    PARoxetine (PAXIL) 30 MG tablet, Take 40 mg by mouth nightly , Disp: , Rfl:     LORazepam (ATIVAN) 1 MG tablet, Take 1 mg by mouth 2 times daily  . , Disp: , Rfl:     magnesium oxide (MAGOX 400) 400 (241.3 MG) MG TABS tablet, Take 400 mg by mouth every morning , Disp: , Rfl:     Probiotic Product (PROBIOTIC DAILY PO), Take 2 capsules by mouth every morning , Disp: , Rfl:     amylase-lipase-protease, (CREON 6000) 6000 UNITS per capsule, Take 2 capsules by mouth 3 times daily (with meals) , Disp: , Rfl:   Allergies   Allergen Reactions    Tramadol Anaphylaxis     Other reaction(s): Other: See Comments  also seizure     Morphine Hives     pt states that she has tolerated Dilaudid in the past w/o reaction (5/4/11)    Topiramate      Other reaction(s):  Other: See Comments  heart palpitations    Codeine Hives    Demerol Hives    Influenza Vaccines     Nsaids      Other reaction(s): GI Upset  H/o ulcers    Prochlorperazine Edisylate     Trazodone And Nefazodone Other (See Comments)     Nasal sinus swelling    Casein Nausea And Vomiting    Eggs Or Egg-Derived Products Nausea And Vomiting    Gluten Meal Nausea And Vomiting    Peanuts [Peanut Oil] Nausea And Vomiting    Whey Nausea And Vomiting           Past Medical History:   Diagnosis Date    Arthritis     Asthma exacerbation with COPD (chronic obstructive pulmonary disease) ([facility] [address] 2/13/2017    Asthma exacerb[facility] with COPD [facility]     C. difficile colitis     Endometriosis     Essential hypertension 2/22/2017    Fibromyalgia     GERD (gastroesophageal reflux disease)     Influenza A 2/13/2017    Interstitial lung disease (HCC)     Malabsorption syndrome     Migraine     Narcotic abuse (HCC)     Neuromuscular disorder (HCC)     Ovarian cyst     Pancreatitis     POTS (postural orthostatic tachycardia syndrome)     Psychiatric problem     Rheumatoid arthritis (HCC)     Seizures (HCC)     due to Ultram per pt    Systemic candidiasis ([facility] [address]      Family History   Problem Relation Age of Onset    High Blood Pressure Father     High Cholesterol Father     High Blood Pressure Mother     No Known Problems Sister       Past Surgical History:   Procedure Laterality Date    ABDOMEN SURGERY  3/5/12    endometreosis    APPENDECTOMY      BRONCHOSCOPY  11/01/2016    BRONCHOSCOPY  12/14/2016    CHOLECYSTECTOMY  02/25/2016    lap    COLONOSCOPY      ENDOMETRIAL ABLATION      ENDOSCOPY, COLON, DIAGNOSTIC      HYSTERECTOMY      SMALL INTESTINE SURGERY      related to endometriosis    MARCELINO AND BSO      TONSILLECTOMY        Social History     Socioeconomic History    Marital status:      Spouse name: Not on file    Number of children: Not on file    Years of education: Not on file    Highest education level: Not on file   Occupational History    Not on file   Social Needs    Financial resource strain: Not on file    Food insecurity:     Worry: Not on file     Inability: Not on file    Transportation needs:     Medical: Not on file     Non-medical: Not on file   Tobacco Use    Smoking status: Current Every Day Smoker     Packs/day: 0.50     Start date: 10/28/2004    Smokeless tobacco: Never Used   Substance and Sexual Activity    Alcohol use: No    Drug use: Never    Sexual activity: Not Currently     Partners: Male   Lifestyle    Physical activity:     Days per week: Not on file     Minutes per session: Not on file    Stress: Not on file   Relationships    Social connections:     Talks on phone: Not on file     Gets together: Not on file     Attends Anabaptist service: Not on file     Active member of club or organization: Not on file     Attends meetings of clubs or organizations: Not on file     Relationship status: Not on file    Intimate partner violence:     Fear of current or ex partner: Not on file     Emotionally abused: Not on file     Physically abused: Not on file     Forced sexual activity: Not on file   Other Topics Concern    Not on file   Social History Narrative    Not on file      Social History     Social History Narrative    Not on file        Vitals:    06/13/19 1339   BP: 132/82   Site: Left Upper Arm   Position: Sitting   Pulse: 127   Temp: 97.6 °F (36.4 °C)   TempSrc: Temporal   SpO2: 99%   Weight: 165 lb 9.6 oz (75.1 kg)   Height: 5' 5\" (1.651 m)        Physical Exam   Constitutional: She appears well-developed and well-nourished. No distress. HENT:   Head: Normocephalic. Right Ear: External ear normal.   Left Ear: External ear normal.   Mouth/Throat: No oropharyngeal exudate. Eyes: Pupils are equal, round, and reactive to light. Conjunctivae are normal. Right eye exhibits no discharge. Left eye exhibits no discharge. No scleral icterus. Neck: Normal range of motion. Neck supple. No thyromegaly present. Cardiovascular: Normal rate, regular rhythm, normal heart sounds and intact distal pulses. Pulmonary/Chest: Effort normal. No stridor. She has no wheezes. She has no rales. She exhibits no tenderness. Abdominal: Soft. Bowel sounds are normal. She exhibits no distension and no mass. There is no tenderness. There is no rebound and no guarding. No hernia. Musculoskeletal: Normal range of motion. Multiple  joints were examined and no active synovitis noticed. She has diffuse tenderness on light touch. Lymphadenopathy:     She has no cervical adenopathy. Skin: Skin is warm and dry. Capillary refill takes less than 2 seconds. No rash noted. She is not diaphoretic. No erythema. No pallor. Psychiatric: She has a normal mood and affect. Her behavior is normal. Judgment and thought content normal.        Mandi was seen today for joint pain, fatigue and abdominal pain. Diagnoses and all orders for this visit:    Elevated liver enzymes  Most likely medication induced. Follows with gastroenterologist.    Rheumatoid arthritis of multiple sites with negative rheumatoid factor (HCC)  -No evidence of seropositive rheumatoid arthritis. Rheumatoid factor, anti-CCP antibody, x-ray of bilateral hands and feet, ESR and CRP within reference range. Symptoms are due to chronic fatigue syndrome. Fibromyalgia  Recommend  to follow-up with primary care. POTS (postural orthostatic tachycardia syndrome)         Return in about 3 months (around 9/13/2019). Aj Trejo MD     *This document was created using voice recognition software. Note was reviewed, however may contain grammatical errors.

## 2019-06-14 ENCOUNTER — TELEPHONE (OUTPATIENT)
Dept: PRIMARY CARE CLINIC | Age: 44
End: 2019-06-14

## 2019-06-14 NOTE — TELEPHONE ENCOUNTER
Pt is being referred to you by dr Geena Galeana. Was a pt of dr Fatuma Alston and needs a new pcp. ok to establish?

## 2019-06-17 RX ORDER — LIDOCAINE 50 MG/G
1 PATCH TOPICAL DAILY
Qty: 30 PATCH | Refills: 5 | Status: SHIPPED | OUTPATIENT
Start: 2019-06-17 | End: 2019-06-21

## 2019-06-19 NOTE — TELEPHONE ENCOUNTER
[employer] will not cover Lidocaine 5%, wanting to know if you could order Lidocaine 4% as insurance will cover these

## 2019-06-21 RX ORDER — LIDOCAINE 4 G/G
1 PATCH TOPICAL DAILY
Qty: 30 PATCH | Refills: 5 | Status: SHIPPED | OUTPATIENT
Start: 2019-06-21 | End: 2019-08-19

## 2019-07-02 ENCOUNTER — OFFICE VISIT (OUTPATIENT)
Dept: FAMILY MEDICINE CLINIC | Age: 44
End: 2019-07-02
Payer: COMMERCIAL

## 2019-07-02 VITALS
WEIGHT: 168.2 LBS | OXYGEN SATURATION: 98 % | TEMPERATURE: 97.1 F | BODY MASS INDEX: 28.02 KG/M2 | SYSTOLIC BLOOD PRESSURE: 136 MMHG | HEIGHT: 65 IN | HEART RATE: 98 BPM | DIASTOLIC BLOOD PRESSURE: 80 MMHG

## 2019-07-02 DIAGNOSIS — I10 ESSENTIAL HYPERTENSION: Primary | ICD-10-CM

## 2019-07-02 DIAGNOSIS — G43.719 INTRACTABLE CHRONIC MIGRAINE WITHOUT AURA AND WITHOUT STATUS MIGRAINOSUS: ICD-10-CM

## 2019-07-02 DIAGNOSIS — A18.01 POTT'S DISEASE: ICD-10-CM

## 2019-07-02 DIAGNOSIS — E53.8 FOLATE DEFICIENCY: ICD-10-CM

## 2019-07-02 DIAGNOSIS — J45.20 MILD INTERMITTENT ASTHMA WITHOUT COMPLICATION: ICD-10-CM

## 2019-07-02 DIAGNOSIS — M06.09 RHEUMATOID ARTHRITIS OF MULTIPLE SITES WITH NEGATIVE RHEUMATOID FACTOR (HCC): Chronic | ICD-10-CM

## 2019-07-02 DIAGNOSIS — F33.2 SEVERE EPISODE OF RECURRENT MAJOR DEPRESSIVE DISORDER, WITHOUT PSYCHOTIC FEATURES (HCC): ICD-10-CM

## 2019-07-02 DIAGNOSIS — R30.0 DYSURIA: ICD-10-CM

## 2019-07-02 PROBLEM — J84.9 INTERSTITIAL LUNG DISEASE (HCC): Status: RESOLVED | Noted: 2019-07-02 | Resolved: 2019-07-02

## 2019-07-02 PROBLEM — R56.9 SEIZURES (HCC): Status: RESOLVED | Noted: 2019-07-02 | Resolved: 2019-07-02

## 2019-07-02 PROBLEM — E61.1 IRON DEFICIENCY: Status: RESOLVED | Noted: 2017-04-27 | Resolved: 2019-07-02

## 2019-07-02 PROBLEM — R19.7 DIARRHEA: Status: RESOLVED | Noted: 2017-11-15 | Resolved: 2019-07-02

## 2019-07-02 LAB
BILIRUBIN, POC: NORMAL
BLOOD URINE, POC: NORMAL
CLARITY, POC: CLEAR
COLOR, POC: YELLOW
GLUCOSE URINE, POC: NORMAL
KETONES, POC: NORMAL
LEUKOCYTE EST, POC: NORMAL
NITRITE, POC: NORMAL
PH, POC: 5.5
PROTEIN, POC: NORMAL
SPECIFIC GRAVITY, POC: <=1.005
UROBILINOGEN, POC: 0.2

## 2019-07-02 PROCEDURE — 81002 URINALYSIS NONAUTO W/O SCOPE: CPT | Performed by: NURSE PRACTITIONER

## 2019-07-02 PROCEDURE — 99214 OFFICE O/P EST MOD 30 MIN: CPT | Performed by: NURSE PRACTITIONER

## 2019-07-02 RX ORDER — FOLIC ACID 1 MG/1
1 TABLET ORAL DAILY
Qty: 30 TABLET | Refills: 5 | Status: SHIPPED | OUTPATIENT
Start: 2019-07-02 | End: 2020-07-22 | Stop reason: SDUPTHER

## 2019-07-02 RX ORDER — GUAIFENESIN 600 MG/1
600 TABLET, EXTENDED RELEASE ORAL 2 TIMES DAILY PRN
Qty: 30 TABLET | Refills: 1 | Status: SHIPPED | OUTPATIENT
Start: 2019-07-02 | End: 2021-06-07

## 2019-07-02 ASSESSMENT — ENCOUNTER SYMPTOMS
CHEST TIGHTNESS: 0
DIARRHEA: 0
NAUSEA: 0
BACK PAIN: 1
SHORTNESS OF BREATH: 0
CONSTIPATION: 0
EYES NEGATIVE: 1
COUGH: 0
ALLERGIC/IMMUNOLOGIC NEGATIVE: 1
ABDOMINAL PAIN: 0

## 2019-07-02 NOTE — PROGRESS NOTES
MG/ML SOAJ       VYVANSE 50 MG capsule       PROMETHEGAN 25 MG suppository       naratriptan (AMERGE) 2.5 MG tablet One tablet by mouth at the onset of headache, may repeat in 4 hours if needed; No more than two doses in a 24 hour period 9 tablet 0    dexlansoprazole (DEXILANT) 60 MG CPDR delayed release capsule Take 1 capsule by mouth daily 30 capsule 1    alendronate (FOSAMAX) 70 MG tablet Take 70 mg by mouth      propranolol (INDERAL LA) 60 MG extended release capsule TAKE 1 CAPSULE BY MOUTH DAILY 90 capsule 1    estradiol (ESTRACE) 0.5 MG tablet Take 0.5 mg by mouth daily      ranitidine (ZANTAC) 300 MG tablet Take 300 mg by mouth 2 times daily       baclofen (LIORESAL) 20 MG tablet Take 20 mg by mouth 3 times daily      Nebulizer MISC For use with aerosol solution every 6 hours as needed 1 each 0    Respiratory Therapy Supplies (NEBULIZER/ADULT MASK) KIT For use with aerosol solution every 6 hours as needed 1 kit 0    PARoxetine (PAXIL) 30 MG tablet Take 40 mg by mouth nightly       LORazepam (ATIVAN) 1 MG tablet Take 1 mg by mouth 2 times daily  .  magnesium oxide (MAGOX 400) 400 (241.3 MG) MG TABS tablet Take 400 mg by mouth every morning       Probiotic Product (PROBIOTIC DAILY PO) Take 2 capsules by mouth every morning       amylase-lipase-protease, (CREON 6000) 6000 UNITS per capsule Take 2 capsules by mouth 3 times daily (with meals)       albuterol sulfate HFA (PROVENTIL HFA) 108 (90 Base) MCG/ACT inhaler Inhale 2 puffs into the lungs every 4 hours as needed for Wheezing 1 Inhaler 1    Electronic Thermometer (ORAL TEMP DIGITAL THERMOMETER) MISC For use to monitor temperature daily as needed 1 each 0    Misc. Devices (WALL GRAB BAR) MISC Grab bar for bathroom wall 1 each 0    Misc. Devices (HAND HELD SHOWER SPRAY) MISC For use in shower with use of bench 1 each 0     No current facility-administered medications on file prior to visit.         Allergies   Allergen Reactions    sounds are normal. She exhibits no distension and no mass. There is no hepatosplenomegaly. There is no tenderness. Musculoskeletal: She exhibits no edema, tenderness or deformity. Shaky, weak slow gait with cane  Multiple  joints were examined and no active synovitis noticed. She has diffuse tenderness on light touch. Neurological: She is alert and oriented to person, place, and time. She has normal strength and normal reflexes. No cranial nerve deficit or sensory deficit. Skin: Skin is warm, dry and intact. Capillary refill takes less than 2 seconds. No rash noted. Psychiatric: She has a normal mood and affect. Her speech is normal and behavior is normal. Judgment and thought content normal. Cognition and memory are normal.   Vitals reviewed. ASSESSMENT/PLAN:    Devonte Vick was seen today for new patient. Diagnoses and all orders for this visit:    Essential hypertension  Comments:  stable    Folate deficiency  -     folic acid (FOLVITE) 1 MG tablet; Take 1 tablet by mouth daily    Intractable chronic migraine without aura and without status migrainosus    Severe episode of recurrent major depressive disorder, without psychotic features (HCC)    Rheumatoid arthritis of multiple sites with negative rheumatoid factor ([facility])    Dysuria  -     POCT Urinalysis no Micro    Mild intermittent asthma without complication  -     XR CHEST STANDARD (2 VW); Future    Pott's disease    Other orders  -     guaiFENesin (MUCINEX) 600 MG extended release tablet; Take 1 tablet by mouth 2 times daily as needed for Congestion  -     Cholecalciferol 2000 units CAPS; 2 po qd     Plan  Continue with specialists. If she is not going to see someone in [place] for rheum, needs to let me know  UA unremarkable.   CXR is normal    Orders Placed This Encounter   Procedures    XR CHEST STANDARD (2 VW)     Standing Status:   Future     Number of Occurrences:   1     Standing Expiration Date:   7/2/2020    POCT Urinalysis no Micro

## 2019-07-03 ENCOUNTER — TELEPHONE (OUTPATIENT)
Dept: FAMILY MEDICINE CLINIC | Age: 44
End: 2019-07-03

## 2019-08-19 ENCOUNTER — TELEPHONE (OUTPATIENT)
Dept: RHEUMATOLOGY | Age: 44
End: 2019-08-19

## 2019-08-19 RX ORDER — LIDOCAINE 50 MG/G
1 PATCH TOPICAL DAILY
Qty: 30 PATCH | Refills: 3 | Status: SHIPPED | OUTPATIENT
Start: 2019-08-19 | End: 2019-09-18

## 2019-08-26 ENCOUNTER — TELEPHONE (OUTPATIENT)
Dept: FAMILY MEDICINE CLINIC | Age: 44
End: 2019-08-26

## 2019-09-09 NOTE — TELEPHONE ENCOUNTER
I left a message on the voicemail stating that the letter she was asking for would need to come from the cardiologist rather than her primary care.

## 2019-09-30 RX ORDER — ALBUTEROL SULFATE 90 UG/1
1 AEROSOL, METERED RESPIRATORY (INHALATION) EVERY 4 HOURS PRN
COMMUNITY
Start: 2018-02-27 | End: 2020-07-22 | Stop reason: SDUPTHER

## 2019-09-30 RX ORDER — LISDEXAMFETAMINE DIMESYLATE 60 MG/1
60 CAPSULE ORAL DAILY
COMMUNITY
Start: 2019-08-03

## 2019-10-02 ENCOUNTER — OFFICE VISIT (OUTPATIENT)
Dept: PRIMARY CARE CLINIC | Age: 44
End: 2019-10-02
Payer: COMMERCIAL

## 2019-10-02 VITALS
DIASTOLIC BLOOD PRESSURE: 90 MMHG | HEART RATE: 124 BPM | TEMPERATURE: 98.4 F | SYSTOLIC BLOOD PRESSURE: 142 MMHG | WEIGHT: 169 LBS | OXYGEN SATURATION: 94 % | BODY MASS INDEX: 28.12 KG/M2

## 2019-10-02 DIAGNOSIS — K90.9 MALABSORPTION SYNDROME: ICD-10-CM

## 2019-10-02 DIAGNOSIS — G89.29 CHRONIC LOW BACK PAIN WITH BILATERAL SCIATICA, UNSPECIFIED BACK PAIN LATERALITY: Chronic | ICD-10-CM

## 2019-10-02 DIAGNOSIS — G43.909 MIGRAINE WITHOUT STATUS MIGRAINOSUS, NOT INTRACTABLE, UNSPECIFIED MIGRAINE TYPE: ICD-10-CM

## 2019-10-02 DIAGNOSIS — G90.A POTS (POSTURAL ORTHOSTATIC TACHYCARDIA SYNDROME): ICD-10-CM

## 2019-10-02 DIAGNOSIS — M54.42 CHRONIC LOW BACK PAIN WITH BILATERAL SCIATICA, UNSPECIFIED BACK PAIN LATERALITY: Chronic | ICD-10-CM

## 2019-10-02 DIAGNOSIS — F33.2 MDD (MAJOR DEPRESSIVE DISORDER), RECURRENT SEVERE, WITHOUT PSYCHOSIS (HCC): ICD-10-CM

## 2019-10-02 DIAGNOSIS — K21.9 GASTROESOPHAGEAL REFLUX DISEASE, ESOPHAGITIS PRESENCE NOT SPECIFIED: Chronic | ICD-10-CM

## 2019-10-02 DIAGNOSIS — R10.84 GENERALIZED ABDOMINAL PAIN: Primary | ICD-10-CM

## 2019-10-02 DIAGNOSIS — M79.7 FIBROMYALGIA: ICD-10-CM

## 2019-10-02 DIAGNOSIS — G25.81 RLS (RESTLESS LEGS SYNDROME): ICD-10-CM

## 2019-10-02 DIAGNOSIS — M06.09 RHEUMATOID ARTHRITIS OF MULTIPLE SITES WITH NEGATIVE RHEUMATOID FACTOR (HCC): Chronic | ICD-10-CM

## 2019-10-02 DIAGNOSIS — I10 ESSENTIAL HYPERTENSION: ICD-10-CM

## 2019-10-02 DIAGNOSIS — M51.9 LUMBAR DISC DISORDER: ICD-10-CM

## 2019-10-02 DIAGNOSIS — M54.41 CHRONIC LOW BACK PAIN WITH BILATERAL SCIATICA, UNSPECIFIED BACK PAIN LATERALITY: Chronic | ICD-10-CM

## 2019-10-02 PROCEDURE — G8484 FLU IMMUNIZE NO ADMIN: HCPCS | Performed by: NURSE PRACTITIONER

## 2019-10-02 PROCEDURE — G8427 DOCREV CUR MEDS BY ELIG CLIN: HCPCS | Performed by: NURSE PRACTITIONER

## 2019-10-02 PROCEDURE — 99215 OFFICE O/P EST HI 40 MIN: CPT | Performed by: NURSE PRACTITIONER

## 2019-10-02 PROCEDURE — G8417 CALC BMI ABV UP PARAM F/U: HCPCS | Performed by: NURSE PRACTITIONER

## 2019-10-02 PROCEDURE — 4004F PT TOBACCO SCREEN RCVD TLK: CPT | Performed by: NURSE PRACTITIONER

## 2019-10-02 RX ORDER — POLYETHYLENE GLYCOL 3350 17 G/17G
POWDER, FOR SOLUTION ORAL
COMMUNITY
Start: 2019-10-01 | End: 2020-06-12 | Stop reason: ALTCHOICE

## 2019-10-02 RX ORDER — LIDOCAINE 50 MG/G
1 PATCH TOPICAL DAILY
Qty: 30 PATCH | Refills: 0 | Status: SHIPPED | OUTPATIENT
Start: 2019-10-02 | End: 2019-11-01

## 2019-10-02 RX ORDER — GALCANEZUMAB 120 MG/ML
INJECTION, SOLUTION SUBCUTANEOUS
COMMUNITY
Start: 2019-10-01

## 2019-10-02 RX ORDER — FAMOTIDINE 40 MG/1
40 TABLET, FILM COATED ORAL 2 TIMES DAILY
COMMUNITY
Start: 2019-10-01 | End: 2020-12-21

## 2019-10-02 RX ORDER — BACLOFEN 10 MG/1
TABLET ORAL
COMMUNITY
Start: 2019-09-30 | End: 2019-10-02 | Stop reason: ALTCHOICE

## 2019-10-02 RX ORDER — PAROXETINE HYDROCHLORIDE 40 MG/1
TABLET, FILM COATED ORAL
COMMUNITY
Start: 2019-09-30 | End: 2020-05-27 | Stop reason: SDUPTHER

## 2019-10-02 RX ORDER — PROPRANOLOL HCL 60 MG
60 CAPSULE, EXTENDED RELEASE 24HR ORAL DAILY
Qty: 90 CAPSULE | Refills: 1 | Status: SHIPPED
Start: 2019-10-02 | End: 2020-06-22

## 2019-10-02 ASSESSMENT — ENCOUNTER SYMPTOMS
CHEST TIGHTNESS: 0
COUGH: 0
SHORTNESS OF BREATH: 0
EYES NEGATIVE: 1
ABDOMINAL PAIN: 1
ALLERGIC/IMMUNOLOGIC NEGATIVE: 1
NAUSEA: 0
DIARRHEA: 1
BLOOD IN STOOL: 1
CONSTIPATION: 0
BACK PAIN: 1

## 2019-10-07 ENCOUNTER — TELEPHONE (OUTPATIENT)
Dept: PRIMARY CARE CLINIC | Age: 44
End: 2019-10-07

## 2019-10-07 DIAGNOSIS — G89.29 CHRONIC LOW BACK PAIN WITH BILATERAL SCIATICA, UNSPECIFIED BACK PAIN LATERALITY: Primary | ICD-10-CM

## 2019-10-07 DIAGNOSIS — M51.9 LUMBAR DISC DISORDER: ICD-10-CM

## 2019-10-07 DIAGNOSIS — M54.42 CHRONIC LOW BACK PAIN WITH BILATERAL SCIATICA, UNSPECIFIED BACK PAIN LATERALITY: Primary | ICD-10-CM

## 2019-10-07 DIAGNOSIS — M54.41 CHRONIC LOW BACK PAIN WITH BILATERAL SCIATICA, UNSPECIFIED BACK PAIN LATERALITY: Primary | ICD-10-CM

## 2019-10-11 ENCOUNTER — TELEPHONE (OUTPATIENT)
Dept: PRIMARY CARE CLINIC | Age: 44
End: 2019-10-11

## 2019-10-17 ENCOUNTER — TELEPHONE (OUTPATIENT)
Dept: PRIMARY CARE CLINIC | Age: 44
End: 2019-10-17

## 2019-10-22 ENCOUNTER — TELEPHONE (OUTPATIENT)
Dept: FAMILY MEDICINE CLINIC | Age: 44
End: 2019-10-22

## 2020-01-10 ENCOUNTER — OFFICE VISIT (OUTPATIENT)
Dept: FAMILY MEDICINE CLINIC | Age: 45
End: 2020-01-10
Payer: MEDICAID

## 2020-01-10 VITALS
HEART RATE: 123 BPM | RESPIRATION RATE: 20 BRPM | TEMPERATURE: 98 F | SYSTOLIC BLOOD PRESSURE: 132 MMHG | WEIGHT: 170 LBS | BODY MASS INDEX: 28.29 KG/M2 | OXYGEN SATURATION: 97 % | DIASTOLIC BLOOD PRESSURE: 88 MMHG

## 2020-01-10 PROCEDURE — 99213 OFFICE O/P EST LOW 20 MIN: CPT | Performed by: PHYSICIAN ASSISTANT

## 2020-01-10 NOTE — PROGRESS NOTES
1/10/2020   [address]  [facility] Rikki Miranda  : 1975  Age: 45 y.o. Sex: female      Subjective:  Chief Complaint   Patient presents with    Neck Pain       HPI: The patient states that they are experiencing neck pain for the last several weeks. States has history of subluxed herniated disc several years ago by chiropractor but states pain has gotten worse over last several weeks. Pt states sees Dr Nell Puente for neurology and had MRI last summer of brain, c spine, uncertain what MRI showed. Patient has a very complicated and extensive past medical history. Patient has been evaluated for MS which is the reasons for the MRI. Patient also sees Dr. Jd Byrnes in [place] for chronic back problems. Patient states she just recently had EMGs of her lower extremities that she said were negative for any acute findings. Patient presents through express today for the neck pain requesting referral to a specialist as she states that she has had neck pain with radiation that has progressively gotten worse over the last several weeks. Pain is worse with movement. The patient denies any trauma or injury. The pain is currently a 7/10 on the pain scale. The patient has been using lidocaine patches at home with no relief of their symptoms. Patient also has a medical marijuana card. States will occasionally get pain shooting into both of her hands but denies this is been ongoing even before the neck pain started. Denies that it has changed or worsen. They deny any saddle anesthesia. No bowel or bladder incontinence. No chest pain or dyspnea. No fever or chills. No dysuria, urinary, frequency, or gross hematuria. No abdominal pain, nausea, vomiting and or diarrhea.       Current Outpatient Medications:     EMGALITY 120 MG/ML SOSCEE, , Disp: , Rfl:     PARoxetine (PAXIL) 40 MG tablet, , Disp: , Rfl:     polyethylene glycol (GLYCOLAX) powder, , Disp: , Rfl:    famotidine (PEPCID) 40 MG tablet, , Disp: , Rfl:     propranolol (INDERAL LA) 60 MG extended release capsule, Take 1 capsule by mouth daily, Disp: 90 capsule, Rfl: 1    VYVANSE 60 MG CAPS, , Disp: , Rfl:     albuterol sulfate HFA (VENTOLIN HFA) 108 (90 Base) MCG/ACT inhaler, , Disp: , Rfl:     folic acid (FOLVITE) 1 MG tablet, Take 1 tablet by mouth daily, Disp: 30 tablet, Rfl: 5    guaiFENesin (MUCINEX) 600 MG extended release tablet, Take 1 tablet by mouth 2 times daily as needed for Congestion, Disp: 30 tablet, Rfl: 1    Cholecalciferol 2000 units CAPS, 2 po qd, Disp: 60 capsule, Rfl: 5    Galcanezumab-gnlm (EMGALITY) 120 MG/ML SOAJ, , Disp: , Rfl:     VYVANSE 50 MG capsule, , Disp: , Rfl:     PROMETHEGAN 25 MG suppository, , Disp: , Rfl:     naratriptan (AMERGE) 2.5 MG tablet, One tablet by mouth at the onset of headache, may repeat in 4 hours if needed; No more than two doses in a 24 hour period, Disp: 9 tablet, Rfl: 0    dexlansoprazole (DEXILANT) 60 MG CPDR delayed release capsule, Take 1 capsule by mouth daily, Disp: 30 capsule, Rfl: 1    alendronate (FOSAMAX) 70 MG tablet, Take 70 mg by mouth, Disp: , Rfl:     estradiol (ESTRACE) 0.5 MG tablet, Take 0.5 mg by mouth daily, Disp: , Rfl:     ranitidine (ZANTAC) 300 MG tablet, Take 300 mg by mouth 2 times daily , Disp: , Rfl:     baclofen (LIORESAL) 20 MG tablet, Take 20 mg by mouth 3 times daily, Disp: , Rfl:     Nebulizer MISC, For use with aerosol solution every 6 hours as needed, Disp: 1 each, Rfl: 0    Respiratory Therapy Supplies (NEBULIZER/ADULT MASK) KIT, For use with aerosol solution every 6 hours as needed, Disp: 1 kit, Rfl: 0    PARoxetine (PAXIL) 30 MG tablet, Take 40 mg by mouth nightly , Disp: , Rfl:     LORazepam (ATIVAN) 1 MG tablet, Take 1 mg by mouth 2 times daily  . , Disp: , Rfl:     magnesium oxide (MAGOX 400) 400 (241.3 MG) MG TABS tablet, Take 400 mg by mouth every morning , Disp: , Rfl:     Probiotic Product (PROBIOTIC DAILY PO), Take 2 capsules by mouth every morning , Disp: , Rfl:     amylase-lipase-protease, (CREON 6000) 6000 UNITS per capsule, Take 2 capsules by mouth 3 times daily (with meals) , Disp: , Rfl:    Allergies   Allergen Reactions    Tramadol Anaphylaxis     Other reaction(s): Other: See Comments  also seizure     Morphine Hives     pt states that she has tolerated Dilaudid in the past w/o reaction (5/4/11)    Topiramate      Other reaction(s): Other: See Comments  heart palpitations    Codeine Hives    Demerol Hives    Influenza Vaccines     Nsaids      Other reaction(s): GI Upset  H/o ulcers    Prochlorperazine Edisylate     Trazodone And Nefazodone Other (See Comments)     Nasal sinus swelling    Casein Nausea And Vomiting    Eggs Or Egg-Derived Products Nausea And Vomiting    Gluten Meal Nausea And Vomiting    Peanuts [Peanut Oil] Nausea And Vomiting    Whey Nausea And Vomiting        Objective:  Vitals:    01/10/20 1347   BP: 132/88   Pulse: 123   Resp: 20   Temp: 98 °F (36.7 °C)   TempSrc: Temporal   SpO2: 97%   Weight: 170 lb (77.1 kg)        Exam:  Const: Appears chronically ill, walks with a cane no signs of acute distress present. Vital signs reviewed per triage. Head/Face: Normocephalic, atraumatic on inspection  ENMT: Buccal mucosa is moist.  Neck: Supple, trachea midline. No palpable adenopathy. Patient has diffuse tenderness over the midline cervical spine. No trigger points or deformities. No tenderness to the cervical paraspinous muscles. Patient has good ROM but does cause increase discomfort. Resp: Clear to auscultation bilaterally. CV:S1 and S2 audible. Musculo: Spine:  Muscle strength is a 5/5 and equal bilaterally in the upper extremities. Pulses are equal bilaterally. External appearance to upper extremities is normal.  Skin: Dry and warm. Neuro: Alert and oriented x3. Displays comfort and cooperation during encounter. Speech is articulate and fluent.

## 2020-02-05 ENCOUNTER — INITIAL CONSULT (OUTPATIENT)
Dept: NEUROSURGERY | Age: 45
End: 2020-02-05
Payer: MEDICAID

## 2020-02-05 VITALS
SYSTOLIC BLOOD PRESSURE: 127 MMHG | DIASTOLIC BLOOD PRESSURE: 86 MMHG | HEIGHT: 65 IN | WEIGHT: 178 LBS | BODY MASS INDEX: 29.66 KG/M2 | HEART RATE: 85 BPM

## 2020-02-05 PROCEDURE — 99203 OFFICE O/P NEW LOW 30 MIN: CPT | Performed by: PHYSICIAN ASSISTANT

## 2020-02-05 ASSESSMENT — ENCOUNTER SYMPTOMS
SHORTNESS OF BREATH: 0
BACK PAIN: 1
ABDOMINAL PAIN: 0
TROUBLE SWALLOWING: 0
PHOTOPHOBIA: 0

## 2020-03-02 ENCOUNTER — INITIAL CONSULT (OUTPATIENT)
Dept: NEUROSURGERY | Age: 45
End: 2020-03-02
Payer: MEDICAID

## 2020-03-02 VITALS
DIASTOLIC BLOOD PRESSURE: 78 MMHG | BODY MASS INDEX: 31.32 KG/M2 | HEIGHT: 65 IN | HEART RATE: 89 BPM | WEIGHT: 188 LBS | SYSTOLIC BLOOD PRESSURE: 125 MMHG

## 2020-03-02 PROCEDURE — 99214 OFFICE O/P EST MOD 30 MIN: CPT | Performed by: PHYSICIAN ASSISTANT

## 2020-03-02 ASSESSMENT — ENCOUNTER SYMPTOMS
SHORTNESS OF BREATH: 0
TROUBLE SWALLOWING: 0
BACK PAIN: 1
ABDOMINAL PAIN: 0
PHOTOPHOBIA: 0

## 2020-03-02 NOTE — PROGRESS NOTES
Subjective:      Patient ID: Austin Reynolds is a 45 y.o. female. Mojgan Goldberg is a 45year old female with an extensive past medical history of RLS, chronic migraines, seizures, rheumatoid arthritis, POTS, GERD, fibromyalgia, HTN, CHANNING, colitis, and a neuromuscular disorder. She was previous seen in our office 2/5/20 c/o neck pain. Pt presents to the office today with low back pain and feet numbness. Describes the pain as a constant sharp pain in her low back with radiation into her hips. Admits to associated intermittent numbness of the sides of both of her feet. Ambulates with a cane/walker. Initially the back pain started in 2011 s/p SCS placement for endometriosis with complications of MRSA infection. Pain has been worsening the past year. She had tried baclofen, lidocaine patches and medical marijuana with moderate relief. Recently evaluated for physical therapy. Denies lumbar TINO. Denies changes in bowel or bladder, saddle anesthesia, fever, chills, SOB, or chest pain. MRI lumbar spine 2/20/20 demonstrates degenerative disc disease with herniated disc at L4-5. No significant central canal or neuroforaminal stenosis noted. Review of Systems   Constitutional: Negative for fever. HENT: Negative for trouble swallowing. Eyes: Negative for photophobia. Respiratory: Negative for shortness of breath. Cardiovascular: Negative for chest pain. Gastrointestinal: Negative for abdominal pain. Endocrine: Negative for heat intolerance. Genitourinary: Negative for flank pain. Musculoskeletal: Positive for back pain, gait problem and neck pain. Skin: Negative for wound. Neurological: Positive for weakness and numbness. Negative for headaches. Psychiatric/Behavioral: Negative for confusion. Objective:   Physical Exam  Constitutional:       Appearance: Normal appearance. She is well-developed. HENT:      Head: Normocephalic and atraumatic.    Eyes:      Extraocular Movements: Extraocular movements intact. Conjunctiva/sclera: Conjunctivae normal.      Pupils: Pupils are equal, round, and reactive to light. Neck:      Musculoskeletal: Neck supple. Comments: Mild tenderness noted  Cardiovascular:      Rate and Rhythm: Normal rate. Pulmonary:      Effort: Pulmonary effort is normal.   Abdominal:      General: There is no distension. Skin:     General: Skin is warm and dry. Neurological:      Mental Status: She is alert. Comments: Alert and oriented x3  CN3-12 intact  Motor strength full  Sensation intact to light touch  Negative straight leg raise bilaterally    Psychiatric:         Thought Content: Thought content normal.         Assessment: This is a 45year old female presenting with low back pain. She has not tried any conservative therapies. MRI lumbar spine does not show any significant stenosis. Plan:      -Recommend continuing conservative therapies at this time.  Referral made to pain clinic for LESI  -Physical therapy  -Return to neurosurgery clinic PRN  -Call/return sooner if symptoms worsen or new issues arise in the interim         Gladis Morocho PA-C

## 2020-05-27 ENCOUNTER — PREP FOR PROCEDURE (OUTPATIENT)
Dept: PAIN MANAGEMENT | Age: 45
End: 2020-05-27

## 2020-05-27 ENCOUNTER — VIRTUAL VISIT (OUTPATIENT)
Dept: PAIN MANAGEMENT | Age: 45
End: 2020-05-27
Payer: MEDICAID

## 2020-05-27 PROBLEM — M54.50 CHRONIC BILATERAL LOW BACK PAIN WITHOUT SCIATICA: Status: ACTIVE | Noted: 2020-05-27

## 2020-05-27 PROBLEM — G89.29 CHRONIC BILATERAL LOW BACK PAIN WITHOUT SCIATICA: Status: ACTIVE | Noted: 2020-05-27

## 2020-05-27 PROBLEM — M47.817 LUMBOSACRAL SPONDYLOSIS WITHOUT MYELOPATHY: Status: ACTIVE | Noted: 2020-05-27

## 2020-05-27 PROBLEM — G89.4 CHRONIC PAIN SYNDROME: Status: ACTIVE | Noted: 2020-05-27

## 2020-05-27 PROCEDURE — 99204 OFFICE O/P NEW MOD 45 MIN: CPT | Performed by: PAIN MEDICINE

## 2020-05-27 NOTE — PROGRESS NOTES
Graham Hernandez was read the following message We want to confirm that, for purposes of billing, this is a virtual visit with your provider for which we will submit a claim for reimbursement with your insurance company. You will be responsible for any copays, coinsurance amounts or other amounts not covered by your insurance company. If you do not accept this, unfortunately we will not be able to schedule a virtual visit with the provider. Do you accept? Mandi responded Yes .
complaints. All other review of systems was negative. PHYSICAL EXAMINATION:      General:       A & O x3    HEENT:    Head:normocephalic and atraumatic  Sclera: icterus absent     Lungs:    Breathing:non-labored    Lumbar spine:    Range of motion:abnormal mildly in lateral bending, flexion, extension rotation bilateral and is  painful. Musculoskeletal:    SLR:negative right, negative left, sitting     Extremities:    Tremors:None bilaterally upper  Range of motion:pain with internal rotation of hips positive. Intact:Yes    Neurological:     Motor:          No apparent weakness per patient       [address] [facility]    Dermatology:    Skin:no unusual rashes and no skin lesions    Impression:    LBP  Lumbar MRI shows DDD without sig central or foraminal stenosis  Referred by NSGY for procedures  Extensive PMHx - RLS, chronic migraines, seizures, rheumatoid arthritis, POTS, GERD, fibromyalgia, HTN, CHANNING, colitis, and a neuromuscular disorder  Hx SCS TRIAL for endometriosis which was complicated by a MRSA infection tx with PICC line abx  Has tried baclofen, lidocaine patches, and medical THC with moderate relief    Plan:  Referral documents and imaging reviewed (report scanned into system from [facility])  Urine screen deferred  OARRS report reviewed  B/l L3,4,5 MNBB #1 - r/b and procedure discussed  Patient encouraged to stay active and to lose weight  Treatment plan discussed with the patient including medications and procedure side effects     We discussed with the patient that combining opioids, benzodiazepines, alcohol, illicit drugs or sleep aids increases the risk of respiratory depression including death. We discussed that these medications may cause drowsiness, sedation or dizziness and have counseled the patient not to drive or operate machinery. We have discussed that these medications will be prescribed only by one provider.  We have discussed with the patient about age related risk factors and

## 2020-05-28 ENCOUNTER — TELEPHONE (OUTPATIENT)
Dept: PAIN MANAGEMENT | Age: 45
End: 2020-05-28

## 2020-05-28 NOTE — TELEPHONE ENCOUNTER
Called patient to schedule procedure with Dr. Norm Gore on 6/16/2020. Instructed patient that PAT will call her to get her COVID testing prior to  procedure and she is to self quarantine until after the procedure. She verbalized understanding, the procedure. If she does not get the COVID testing prior, she will not be able to get her procedure.

## 2020-06-11 ENCOUNTER — HOSPITAL ENCOUNTER (OUTPATIENT)
Age: 45
Discharge: HOME | End: 2020-06-13
Payer: MEDICAID

## 2020-06-11 PROCEDURE — U0003 INFECTIOUS AGENT DETECTION BY NUCLEIC ACID (DNA OR RNA); SEVERE ACUTE RESPIRATORY SYNDROME CORONAVIRUS 2 (SARS-COV-2) (CORONAVIRUS DISEASE [COVID-19]), AMPLIFIED PROBE TECHNIQUE, MAKING USE OF HIGH THROUGHPUT TECHNOLOGIES AS DESCRIBED BY CMS-2020-01-R: HCPCS

## 2020-06-12 NOTE — PROGRESS NOTES
[facility]  INSTRUCTIONS    . ..........................................................................................................................................       ? Parking the day of Surgery is located in the [facility]. Upon entering the door, make an immediate right to the surgery reception desk    ? Bring photo ID and insurance card     ? You may have a light breakfast day of procedure    ? Wear loose comfortable clothing    ? Please follow instructions for medications as given per Dr's office     ? Stop blood thinners as per Dr's office instructions    ? You can expect a call the business day prior to procedure to notify you if your arrival time changes    ? Please arrange for     ?   Other instructions

## 2020-06-13 LAB
SARS-COV-2: NOT DETECTED
SOURCE: NORMAL

## 2020-06-16 ENCOUNTER — HOSPITAL ENCOUNTER (OUTPATIENT)
Age: 45
Setting detail: OUTPATIENT SURGERY
Discharge: HOME | End: 2020-06-16
Attending: PAIN MEDICINE | Admitting: PAIN MEDICINE
Payer: MEDICAID

## 2020-06-16 ENCOUNTER — HOSPITAL ENCOUNTER (OUTPATIENT)
Dept: GENERAL RADIOLOGY | Age: 45
Setting detail: OUTPATIENT SURGERY
Discharge: HOME | End: 2020-06-18
Attending: PAIN MEDICINE
Payer: MEDICAID

## 2020-06-16 VITALS
OXYGEN SATURATION: 97 % | HEIGHT: 65 IN | DIASTOLIC BLOOD PRESSURE: 77 MMHG | BODY MASS INDEX: 28.32 KG/M2 | WEIGHT: 170 LBS | TEMPERATURE: 98 F | HEART RATE: 78 BPM | SYSTOLIC BLOOD PRESSURE: 120 MMHG | RESPIRATION RATE: 16 BRPM

## 2020-06-16 PROCEDURE — 7100000010 HC PHASE II RECOVERY - FIRST 15 MIN: Performed by: PAIN MEDICINE

## 2020-06-16 PROCEDURE — 3600000002 HC SURGERY LEVEL 2 BASE: Performed by: PAIN MEDICINE

## 2020-06-16 PROCEDURE — 3209999900 FLUORO FOR SURGICAL PROCEDURES

## 2020-06-16 PROCEDURE — 2709999900 HC NON-CHARGEABLE SUPPLY: Performed by: PAIN MEDICINE

## 2020-06-16 PROCEDURE — 7100000011 HC PHASE II RECOVERY - ADDTL 15 MIN: Performed by: PAIN MEDICINE

## 2020-06-16 PROCEDURE — 64493 INJ PARAVERT F JNT L/S 1 LEV: CPT | Performed by: PAIN MEDICINE

## 2020-06-16 PROCEDURE — 2500000003 HC RX 250 WO HCPCS: Performed by: PAIN MEDICINE

## 2020-06-16 PROCEDURE — 64494 INJ PARAVERT F JNT L/S 2 LEV: CPT | Performed by: PAIN MEDICINE

## 2020-06-16 PROCEDURE — 6370000000 HC RX 637 (ALT 250 FOR IP)

## 2020-06-16 RX ORDER — LIDOCAINE HYDROCHLORIDE 5 MG/ML
INJECTION, SOLUTION INFILTRATION; INTRAVENOUS PRN
Status: DISCONTINUED | OUTPATIENT
Start: 2020-06-16 | End: 2020-06-16 | Stop reason: ALTCHOICE

## 2020-06-16 RX ORDER — DIAZEPAM 5 MG/1
10 TABLET ORAL ONCE
Status: COMPLETED | OUTPATIENT
Start: 2020-06-16 | End: 2020-06-16

## 2020-06-16 RX ORDER — BUPIVACAINE HYDROCHLORIDE 2.5 MG/ML
INJECTION, SOLUTION EPIDURAL; INFILTRATION; INTRACAUDAL PRN
Status: DISCONTINUED | OUTPATIENT
Start: 2020-06-16 | End: 2020-06-16 | Stop reason: ALTCHOICE

## 2020-06-16 RX ORDER — DIAZEPAM 5 MG/1
TABLET ORAL
Status: COMPLETED
Start: 2020-06-16 | End: 2020-06-16

## 2020-06-16 RX ADMIN — DIAZEPAM 10 MG: 5 TABLET ORAL at 12:12

## 2020-06-16 ASSESSMENT — PAIN - FUNCTIONAL ASSESSMENT: PAIN_FUNCTIONAL_ASSESSMENT: 0-10

## 2020-06-16 ASSESSMENT — PAIN DESCRIPTION - DESCRIPTORS: DESCRIPTORS: DISCOMFORT

## 2020-06-16 NOTE — PROGRESS NOTES
C/O severe anxiety and history of panic attacks with similar procedures, appears very nervous. Dr Tatyana Broussard updated.

## 2020-06-16 NOTE — H&P
acid (FOLVITE) 1 MG tablet Take 1 tablet by mouth daily 7/2/19   JESSE Torres CNP   guaiFENesin [facility]) 600 MG extended release tablet Take 1 tablet by mouth 2 times daily as needed for Congestion  Patient not taking: Reported on 5/27/2020 7/2/19   JESSE Torres CNP   dexlansoprazole ([facility]) 60 MG CPDR delayed release capsule Take 1 capsule by mouth daily 12/17/18   Liliane Durham DO   alendronate (FOSAMAX) 70 MG tablet Take 70 mg by mouth    Historical Provider, MD   estradiol (ESTRACE) 0.5 MG tablet Take 0.5 mg by mouth daily    Historical Provider, MD   baclofen (LIORESAL) 20 MG tablet Take 20 mg by mouth 3 times daily    Historical Provider, MD   PARoxetine (PAXIL) 30 MG tablet Take 15 mg by mouth nightly     Historical Provider, MD   LORazepam (ATIVAN) 1 MG tablet Take 1 mg by mouth 2 times daily  . 8/14/16   Historical Provider, MD   magnesium oxide (MAGOX 400) 400 (241.3 MG) MG TABS tablet Take 400 mg by mouth every morning     Historical Provider, MD   Probiotic Product (PROBIOTIC DAILY PO) Take 2 capsules by mouth every morning     Historical Provider, MD   amylase-lipase-protease, (CREON 6000) 6000 UNITS per capsule Take 2 capsules by mouth 3 times daily (with meals)     Historical Provider, MD       Allergies   Allergen Reactions    Tramadol Anaphylaxis     Other reaction(s): Other: See Comments  also seizure     Morphine Hives     pt states that she has tolerated Dilaudid in the past w/o reaction (5/4/11)    Topiramate      Other reaction(s):  Other: See Comments  heart palpitations    Codeine Hives    Demerol Hives    Influenza Vaccines     Nsaids      Other reaction(s): GI Upset  H/o ulcers    Prochlorperazine Edisylate     Trazodone And Nefazodone Other (See Comments)     Nasal sinus swelling    Casein Nausea And Vomiting    Eggs Or Egg-Derived Products Nausea And Vomiting    Gluten Meal Nausea And Vomiting    Peanuts [Peanut Oil] Nausea And Vomiting   

## 2020-06-26 NOTE — PROGRESS NOTES
[facility]        [address], [facility] [place]  Dept: [phone]    Tele health follow up Note      Skip Garvin     Date of Visit:  7/1/2020    CC:  Tele health follow up   Chief Complaint   Patient presents with    Follow-up    Lower Back Pain       Consent:  Telehealth Visit due to Budjackelyn 19 pandemic  The patient and/or health care decision maker is aware that he/she may receive a bill for this tele-health service Doxy Me, depending on his insurance coverage, and has provided verbal consent to proceed: Yes  I have considered the risks of abuse, dependence, addiction and diversion. My patient is aware that they will need a follow-up visit (in-person or virtually) at the appropriate time indicated for continued medications. Further, my patient is aware that when this acute crisis has lifted, they will be expected to return for an in-person visit and all elements of standard local and hospital guidelines in order to continue this medication. Patient Location:Home   Physician Location: Other address in [place]    HPI:    Pain is better. Change in quality of symptoms:no. Medication side effects:not applicable . Recent diagnostic testing:none. Recent interventional procedures:b/l L3,4,5 MNBB #1 with 95% relief for day of procedure. She has not been on anticoagulation medications to include ASA, NSAIDS, Plavix, heparin, LMW heparin and warfarin. The patient  has not been on herbal supplements. The patient is not diabetic. Imaging:   MRI lumbar spine 2/20/20 (per NSGY notes)  \"degenerative disc disease with herniated disc at L4-5. No significant central canal or neuroforaminal stenosis noted. \" (pt states done at [facility])        Potential Aberrant Drug-Related Behavior:      Urine Drug Screening:    OARRS report:    Past Medical History: Reviewed    Past Surgical History: Reviewed     Home Medications: Reviewed    Allergies: Reviewed     Social History: Reviewed     REVIEW OF SYSTEMS:     Mandi denies fever/chills, chest pain, shortness of breath, new bowel or bladder complaints. All other review of systems was negative. PHYSICAL EXAMINATION:      General:        A & O x3     HEENT:     Head:normocephalic and atraumatic  Sclera: icterus absent      Lungs:     Breathing:non-labored     Lumbar spine:     Range of motion:abnormal mildly in lateral bending, flexion, extension rotation bilateral and is  painful.     Musculoskeletal:     SLR:negative right, negative left, sitting      Extremities:     Tremors:None bilaterally upper  Range of motion:pain with internal rotation of hips positive. Intact: Yes     Neurological:     Motor:          No apparent weakness per patient       Gait:normal     Dermatology:     Skin:no unusual rashes and no skin lesions     Impression:     LBP  Lumbar MRI shows DDD without sig central or foraminal stenosis  Referred by NSGY for procedures  Extensive PMHx - RLS, chronic migraines, seizures, rheumatoid arthritis, POTS, GERD, fibromyalgia, HTN, CHANNING, colitis, and a neuromuscular disorder  Hx SCS TRIAL for endometriosis which was complicated by a MRSA infection tx with PICC line abx  Has tried baclofen, lidocaine patches, and medical THC with moderate relief     Plan:  Referral documents and imaging reviewed (report scanned into system from [facility])  Urine screen deferred  OARRS report reviewed  B/l L3,4,5 MNBB #1 with 95% relief, ordered #2, valium for presedation (pt aware needs )  Aquatic therapy - encouraged to start back  Patient encouraged to stay active and to lose weight  Treatment plan discussed with the patient including medications and procedure side effects     We discussed with the patient that combining opioids, benzodiazepines, alcohol, illicit drugs or sleep aids increases the risk of respiratory depression including death.  We discussed that these medications may cause drowsiness, sedation or dizziness and have counseled the patient not to drive or operate machinery. We have discussed that these medications will be prescribed only by one provider. We have discussed with the patient about age related risk factors and have thoroughly discussed the importance of taking these medications as prescribed. The patient verbalizes understanding.     Patient advised regarding steps to help prevent the spread of COVID-19   SOURCE - [email or web address] [email or web address].[email or web address] html     1-Stay home except to get medical care  2-Clean your hands often for atleast 20 seconds, avoid touching: Avoid touching your eyes, nose, and mouth with unwashed hands. 3-Seek medical attention: Seek prompt medical attention if your illness is worsening (e.g., difficulty breathing). Call you doctor first.  3-Wear a facemask if you are sick   4-Cover your coughs and sneezes           I affirm this is a Patient Initiated Episode with an established Patient who has not had a related appointment within my department in the past 7 days or scheduled within the next 24 hours.     Total Time: 17 minutes    Cc: Referring physician

## 2020-07-01 ENCOUNTER — VIRTUAL VISIT (OUTPATIENT)
Dept: PAIN MANAGEMENT | Age: 45
End: 2020-07-01
Payer: MEDICAID

## 2020-07-01 ENCOUNTER — PREP FOR PROCEDURE (OUTPATIENT)
Dept: PAIN MANAGEMENT | Age: 45
End: 2020-07-01

## 2020-07-01 PROCEDURE — 99213 OFFICE O/P EST LOW 20 MIN: CPT | Performed by: PAIN MEDICINE

## 2020-07-01 RX ORDER — SODIUM CHLORIDE, SODIUM LACTATE, POTASSIUM CHLORIDE, CALCIUM CHLORIDE 600; 310; 30; 20 MG/100ML; MG/100ML; MG/100ML; MG/100ML
1000 INJECTION, SOLUTION INTRAVENOUS PRN
COMMUNITY
End: 2021-05-13 | Stop reason: ALTCHOICE

## 2020-07-01 RX ORDER — CETIRIZINE HYDROCHLORIDE 10 MG/1
10 TABLET ORAL DAILY
COMMUNITY
End: 2020-07-22

## 2020-07-01 RX ORDER — DIAZEPAM 10 MG/1
TABLET ORAL
Qty: 1 TABLET | Refills: 0 | Status: SHIPPED
Start: 2020-07-01 | End: 2020-07-22

## 2020-07-10 ENCOUNTER — HOSPITAL ENCOUNTER (OUTPATIENT)
Age: 45
Discharge: HOME | End: 2020-07-12
Payer: MEDICAID

## 2020-07-10 PROCEDURE — U0003 INFECTIOUS AGENT DETECTION BY NUCLEIC ACID (DNA OR RNA); SEVERE ACUTE RESPIRATORY SYNDROME CORONAVIRUS 2 (SARS-COV-2) (CORONAVIRUS DISEASE [COVID-19]), AMPLIFIED PROBE TECHNIQUE, MAKING USE OF HIGH THROUGHPUT TECHNOLOGIES AS DESCRIBED BY CMS-2020-01-R: HCPCS

## 2020-07-12 LAB
SARS-COV-2: NOT DETECTED
SOURCE: NORMAL

## 2020-07-13 NOTE — PROGRESS NOTES
Have you been tested for COVID  Yes  Tested on 7-10-20 for OR scheduled 7-14-20            Have you been told you were positive for COVID No  Have you had any known exposure to someone that is positive for COVID No  Do you have a cough                   No              Do you have shortness of breath No                 Do you have a sore throat            No                Are you having chills                    No                Are you having muscle aches. No                    Please come to the hospital wearing a mask and have your significant other wear a mask as well. Both of you should check your temperature before leaving to come here,  if it is 100 or higher please call [phone] for instruction. [facility]IN [facility]NAGEMENT  INSTRUCTIONS    . ..........................................................................................................................................       ? Parking the day of Surgery is located in the [facility]. Upon entering the door, make an immediate right to the surgery reception desk    ? Bring photo ID and insurance card     ? You may have a light breakfast day of procedure    ? Wear loose comfortable clothing    ? Please follow instructions for medications as given per Dr's office     ? Stop blood thinners as per Dr's office instructions    ? You can expect a call the business day prior to procedure to notify you if your arrival time changes    ? Please arrange for     ?   Other instructions

## 2020-07-14 ENCOUNTER — HOSPITAL ENCOUNTER (OUTPATIENT)
Age: 45
Setting detail: OUTPATIENT SURGERY
Discharge: HOME HEALTH | End: 2020-07-14
Attending: PAIN MEDICINE | Admitting: PAIN MEDICINE
Payer: MEDICAID

## 2020-07-14 ENCOUNTER — HOSPITAL ENCOUNTER (OUTPATIENT)
Dept: GENERAL RADIOLOGY | Age: 45
Discharge: HOME | End: 2020-07-16
Attending: PAIN MEDICINE
Payer: MEDICAID

## 2020-07-14 VITALS
SYSTOLIC BLOOD PRESSURE: 117 MMHG | WEIGHT: 170 LBS | DIASTOLIC BLOOD PRESSURE: 66 MMHG | HEIGHT: 65 IN | RESPIRATION RATE: 16 BRPM | BODY MASS INDEX: 28.32 KG/M2 | HEART RATE: 90 BPM | TEMPERATURE: 97.7 F | OXYGEN SATURATION: 95 %

## 2020-07-14 PROCEDURE — 7100000010 HC PHASE II RECOVERY - FIRST 15 MIN: Performed by: PAIN MEDICINE

## 2020-07-14 PROCEDURE — 7100000011 HC PHASE II RECOVERY - ADDTL 15 MIN: Performed by: PAIN MEDICINE

## 2020-07-14 PROCEDURE — 3209999900 FLUORO FOR SURGICAL PROCEDURES

## 2020-07-14 PROCEDURE — 2709999900 HC NON-CHARGEABLE SUPPLY: Performed by: PAIN MEDICINE

## 2020-07-14 PROCEDURE — 64493 INJ PARAVERT F JNT L/S 1 LEV: CPT | Performed by: PAIN MEDICINE

## 2020-07-14 PROCEDURE — 2500000003 HC RX 250 WO HCPCS: Performed by: PAIN MEDICINE

## 2020-07-14 PROCEDURE — 3600000002 HC SURGERY LEVEL 2 BASE: Performed by: PAIN MEDICINE

## 2020-07-14 PROCEDURE — 64494 INJ PARAVERT F JNT L/S 2 LEV: CPT | Performed by: PAIN MEDICINE

## 2020-07-14 RX ORDER — BUPIVACAINE HYDROCHLORIDE 2.5 MG/ML
INJECTION, SOLUTION EPIDURAL; INFILTRATION; INTRACAUDAL PRN
Status: DISCONTINUED | OUTPATIENT
Start: 2020-07-14 | End: 2020-07-14 | Stop reason: ALTCHOICE

## 2020-07-14 RX ORDER — LIDOCAINE HYDROCHLORIDE 5 MG/ML
INJECTION, SOLUTION INFILTRATION; INTRAVENOUS PRN
Status: DISCONTINUED | OUTPATIENT
Start: 2020-07-14 | End: 2020-07-14 | Stop reason: ALTCHOICE

## 2020-07-14 ASSESSMENT — PAIN DESCRIPTION - LOCATION: LOCATION: BACK

## 2020-07-14 ASSESSMENT — PAIN SCALES - GENERAL
PAINLEVEL_OUTOF10: 4
PAINLEVEL_OUTOF10: 4

## 2020-07-14 ASSESSMENT — PAIN DESCRIPTION - ORIENTATION: ORIENTATION: LOWER

## 2020-07-14 ASSESSMENT — PAIN - FUNCTIONAL ASSESSMENT: PAIN_FUNCTIONAL_ASSESSMENT: 0-10

## 2020-07-14 ASSESSMENT — PAIN DESCRIPTION - DESCRIPTORS
DESCRIPTORS: DISCOMFORT
DESCRIPTORS: ACHING

## 2020-07-14 ASSESSMENT — PAIN DESCRIPTION - PAIN TYPE: TYPE: SURGICAL PAIN

## 2020-07-14 NOTE — OP NOTE
2020    Patient: Jorge A Brink  :  1975  Age:  45 y.o. Sex:  female     PRE-OPERATIVE DIAGNOSIS: Bilateral Lumbar spondylosis, lumbar facet syndrome. POST-OPERATIVE DIAGNOSIS: Same. PROCEDURE:  # 2 Fluoroscopic guided lumbar medial branch blocks Bilateral at Levels: L3,4,5 (anesthetizing the L4-5 and L5-S1 facet joints    SURGEON: KIM Oropeza. ANESTHESIA: local    ESTIMATED BLOOD LOSS: None.  __________________________________________________________  BRIEF HISTORY:  Jorge A Brink comes in today for 3 fluoroscopic guided lumbar medial branch blocks  Bilateral  at Levels: L3,4,5. The potential complications of this procedure were discussed with her again today. She has elected to undergo the aforementioned procedure. [facility] complete History & Physical examination were reviewed in depth, a copy of which is in the chart. DESCRIPTION OF PROCEDURE:   After confirming written and informed consent, a time-out was performed and [facility] name and date of birth, the procedure to be performed as well as the plan for the location of the needle insertion were confirmed. The patient was brought into the procedure room and placed in the prone position on the fluoroscopy table. Standard monitors were placed and vital signs were observed throughout the procedure. The area of the lumbar spine was prepped with chloraprep and draped in a sterile manner. AP fluoroscopy was used to identify and cordelia bartons point at the targeted levels. The skin and subcutaneous tissues in these identified areas were anesthetized with 0.5%Lidocaine. A #22 gauge spinal needle was advanced toward the junction of the superior articular process and the transverse process, along the course of the medial branch. Satisfactory needle placement was confirmed by AP and oblique projections.   At the sacral alar level, the sacral alar region was visualized and the needle tip was positioned on the sacral alar at the base of the superior articulating process where the medial branch traverses under fluoroscopic guidance. Once bone was contacted and negative aspiration was confirmed. Three cc's 0.25% marcaine was then injected and distributed equally at each level. Following the procedure Mandi noted improvement of previous pain symptoms. Disposition the patient tolerated the procedure well and there were no complications . Vital signs remained stable throughout the procedure. The patient was escorted to the recovery area where they remained until discharge and written discharge instructions for the procedure were given. Plan: Katiana Elaine will return to our pain management center as scheduled.      Kushal Cooper DO

## 2020-07-14 NOTE — H&P
GINA BEAUCHAMP [facility] - [facility] Pain Management        [address] [facility], [address]  Dept: [phone]    Procedure History & Physical      Isidore Willie     HPI:    Patient  is here for b/l LBP for b/l L3,4,5 MNBB #2  Labs/imaging studies reviewed   All question and concerns addressed including R/B/A associated with the procedure    Past Medical History:   Diagnosis Date    Arthritis     Asthma exacerbation with COPD (chronic obstructive pulmonary disease) ([address] 2/13/2017    controlled with inhalers     Colitis 4/1/2012    Essential hypertension 2/22/2017    Fibromyalgia     GERD (gastroesophageal reflux disease)     Malabsorption syndrome     Neuromuscular disorder ([address]     POTS (postural orthostatic tachycardia syndrome)     [facility]    Psychiatric problem     Rheumatoid arthritis ([address]     Seizures ([address]     due to Ultram per pt- last 2005     Sx of RLS (restless legs syndrome) 10/2/2016       Past Surgical History:   Procedure Laterality Date    ABDOMEN SURGERY  3/5/12    endometreosis    ANESTHESIA NERVE BLOCK Bilateral 6/16/2020    BILATERAL L3 L4 L5 MEDIAL NERVE BRANCH BLOCK   #1 performed by Rk Barrett DO at [address] [place] [address]  11/01/2016    BRONCHOSCOPY  12/14/2016    CHOLECYSTECTOMY  02/25/2016    lap    COLONOSCOPY      ENDOMETRIAL ABLATION      ENDOSCOPY, COLON, DIAGNOSTIC      HYSTERECTOMY      SMALL INTESTINE SURGERY      related to endometriosis    MARCELINO AND BSO      TONSILLECTOMY         Prior to Admission medications    Medication Sig Start Date End Date Taking? Authorizing Provider   cetirizine (ZYRTEC) 10 MG tablet Take 10 mg by mouth daily   Yes Historical Provider, MD   medical marijuana Take by mouth as needed.    Yes Historical Provider, MD   diazePAM (VALIUM) 10 MG tablet Take one tab one hour prior to procedure 7/1/20 7/31/20 Yes Rk Barrett DO   propranolol (INDERAL LA) 60 MG extended release capsule TAKE 1 CAPSULE BY MOUTH DAILY 6/22/20  Yes [employer], APRN - CNP   EMGALITY 120 MG/ML SOSY every 30 days  10/1/19  Yes Historical Provider, MD   famotidine (PEPCID) 40 MG tablet Take 40 mg by mouth 2 times daily  10/1/19  Yes Historical Provider, MD   VYVANSE 60 MG CAPS Take 60 mg by mouth daily. 8/3/19  Yes Historical Provider, MD   albuterol sulfate HFA (VENTOLIN HFA) 108 (90 Base) MCG/ACT inhaler 1 puff every 4 hours as needed  2/27/18  Yes Historical Provider, MD   folic acid (FOLVITE) 1 MG tablet Take 1 tablet by mouth daily 7/2/19  Yes Clara Stephens APRN - CNP   guaiFENesin ([address]) 600 MG extended release tablet Take 1 tablet by mouth 2 times daily as needed for Congestion 7/2/19  Yes Clara Stephens APRN - CNP   dexlansoprazole ([facility]) 60 MG CPDR delayed release capsule Take 1 capsule by mouth daily 12/17/18  Yes Aneesh Valencia,    alendronate (FOSAMAX) 70 MG tablet Take 70 mg by mouth   Yes Historical Provider, MD   estradiol (ESTRACE) 0.5 MG tablet Take 0.5 mg by mouth daily   Yes Historical Provider, MD   baclofen (LIORESAL) 20 MG tablet Take 20 mg by mouth 3 times daily   Yes Historical Provider, MD   PAROXETINE HCL PO Take 15 mg by mouth nightly    Yes Historical Provider, MD   LORazepam (ATIVAN) 1 MG tablet Take 1 mg by mouth 2 times daily  . 8/14/16  Yes Historical Provider, MD   Probiotic Product (PROBIOTIC DAILY PO) Take 2 capsules by mouth every morning LD 7-12-20   Yes Historical Provider, MD   amylase-lipase-protease, (CREON 6000) 6000 UNITS per capsule Take 2 capsules by mouth 3 times daily (with meals)    Yes Historical Provider, MD   lactated ringers infusion Infuse 1,000 mLs intravenously as needed    Historical Provider, MD       Allergies   Allergen Reactions    Tramadol Anaphylaxis     Other reaction(s): Other: See Comments  also seizure     Morphine Hives     pt states that she has tolerated Dilaudid in the past w/o reaction (5/4/11)    Topiramate      Other reaction(s):  Other: See Comments  heart palpitations    Codeine Hives    Demerol Hives    Influenza Vaccines     Nsaids      Other reaction(s): GI Upset  H/o ulcers    Prochlorperazine Edisylate     Trazodone And Nefazodone Other (See Comments)     Nasal sinus swelling    Casein Nausea And Vomiting    Eggs Or Egg-Derived Products Nausea And Vomiting    Gluten Meal Nausea And Vomiting    Peanuts [Peanut Oil] Nausea And Vomiting    Whey Nausea And Vomiting       Social History     Socioeconomic History    Marital status:      Spouse name: Not on file    Number of children: Not on file    Years of education: Not on file    Highest education level: Not on file   Occupational History    Not on file   Social Needs    Financial resource strain: Not on file    Food insecurity     Worry: Not on file     Inability: Not on file    Transportation needs     Medical: Not on file     Non-medical: Not on file   Tobacco Use    Smoking status: Current Every Day Smoker     Packs/day: 0.50     Years: 15.00     Pack years: 7.50     Types: Cigarettes     Start date: 10/28/2004    Smokeless tobacco: Never Used    Tobacco comment: smokes about 5 cigarettes a day   Substance and Sexual Activity    Alcohol use: No    Drug use: Yes     Types: Marijuana     Comment: medical     Sexual activity: Not Currently     Partners: Male   Lifestyle    Physical activity     Days per week: Not on file     Minutes per session: Not on file    Stress: Not on file   Relationships    Social connections     Talks on phone: Not on file     Gets together: Not on file     Attends Nondenominational service: Not on file     Active member of club or organization: Not on file     Attends meetings of clubs or organizations: Not on file     Relationship status: Not on file    Intimate partner violence     Fear of current or ex partner: Not on file     Emotionally abused: Not on file     Physically abused: Not on file     Forced sexual activity: Not on file   Other Topics Concern  Not on file   Social History Narrative    Not on file       Family History   Problem Relation Age of Onset    High Blood Pressure Father     High Cholesterol Father     High Blood Pressure Mother     No Known Problems Sister          REVIEW OF SYSTEMS:    CONSTITUTIONAL:  negative for  fevers, chills, sweats and fatigue    RESPIRATORY:  negative for  dry cough, cough with sputum, dyspnea, wheezing and chest pain    CARDIOVASCULAR:  negative for chest pain, dyspnea, palpitations, syncope    GASTROINTESTINAL:  negative for nausea, vomiting, change in bowel habits, diarrhea, constipation and abdominal pain    MUSCULOSKELETAL: negative for muscle weakness    SKIN: negative for itching or rashes. BEHAVIOR/PSYCH:  negative for poor appetite, increased appetite, decreased sleep and poor concentration    All other systems negative      PHYSICAL EXAM:    VITALS:  /81   Pulse 98   Temp 97.7 °F (36.5 °C)   Resp 16   Ht 5' 5\" (1.651 m)   Wt 170 lb (77.1 kg)   SpO2 95%   BMI 28.29 kg/m²     CONSTITUTIONAL:  awake, alert, cooperative, no apparent distress, and appears stated age    EYES: PERRLA, EOMI    LUNGS:  No increased work of breathing, no audible wheezing    CARDIOVASCULAR:  regular rate and rhythm    ABDOMEN:  Soft non tender non distended     EXTREMITIES: no signs of clubbing or cyanosis. MUSCULOSKELETAL: negative for flaccid muscle tone or spastic movements. SKIN: gross examination reveals no signs of rashes, or diaphoresis. NEURO: Cranial nerves II-XII grossly intact. No signs of agitated mood.        Assessment/Plan:    B/l LB pain for b/l L3,4,5 MNBB #2

## 2020-07-16 ENCOUNTER — PREP FOR PROCEDURE (OUTPATIENT)
Dept: PAIN MANAGEMENT | Age: 45
End: 2020-07-16

## 2020-07-16 ENCOUNTER — TELEPHONE (OUTPATIENT)
Dept: PAIN MANAGEMENT | Age: 45
End: 2020-07-16

## 2020-07-22 ENCOUNTER — VIRTUAL VISIT (OUTPATIENT)
Dept: PRIMARY CARE CLINIC | Age: 45
End: 2020-07-22
Payer: MEDICAID

## 2020-07-22 PROBLEM — J44.1 ASTHMA EXACERBATION WITH COPD (CHRONIC OBSTRUCTIVE PULMONARY DISEASE) (HCC): Status: RESOLVED | Noted: 2017-02-13 | Resolved: 2020-07-22

## 2020-07-22 PROBLEM — J45.901 ASTHMA EXACERBATION WITH COPD (CHRONIC OBSTRUCTIVE PULMONARY DISEASE) (HCC): Status: RESOLVED | Noted: 2017-02-13 | Resolved: 2020-07-22

## 2020-07-22 PROBLEM — Z86.2 HISTORY OF IRON DEFICIENCY ANEMIA: Status: RESOLVED | Noted: 2018-10-25 | Resolved: 2020-07-22

## 2020-07-22 PROBLEM — J45.20 MILD INTERMITTENT ASTHMA WITHOUT COMPLICATION: Status: ACTIVE | Noted: 2020-07-22

## 2020-07-22 PROCEDURE — 99214 OFFICE O/P EST MOD 30 MIN: CPT | Performed by: FAMILY MEDICINE

## 2020-07-22 RX ORDER — FOLIC ACID 1 MG/1
1 TABLET ORAL DAILY
Qty: 30 TABLET | Refills: 5 | Status: SHIPPED | OUTPATIENT
Start: 2020-07-22

## 2020-07-22 RX ORDER — CETIRIZINE HYDROCHLORIDE 10 MG/1
10 TABLET ORAL DAILY
Qty: 30 TABLET | Refills: 5 | Status: SHIPPED
Start: 2020-07-22 | End: 2021-03-04

## 2020-07-22 RX ORDER — LIDOCAINE 4 G/G
1 PATCH TOPICAL DAILY
COMMUNITY

## 2020-07-22 RX ORDER — ALBUTEROL SULFATE 90 UG/1
1 AEROSOL, METERED RESPIRATORY (INHALATION) EVERY 4 HOURS PRN
Qty: 1 INHALER | Refills: 2 | Status: SHIPPED
Start: 2020-07-22 | End: 2022-04-12 | Stop reason: SDUPTHER

## 2020-07-22 RX ORDER — MONTELUKAST SODIUM 10 MG/1
10 TABLET ORAL DAILY
Qty: 30 TABLET | Refills: 3 | Status: SHIPPED
Start: 2020-07-22 | End: 2020-08-25 | Stop reason: ALTCHOICE

## 2020-07-22 ASSESSMENT — ENCOUNTER SYMPTOMS
VOMITING: 0
EYE REDNESS: 0
SORE THROAT: 0
COLOR CHANGE: 0
CONSTIPATION: 0
CHEST TIGHTNESS: 0
HOARSE VOICE: 0
RHINORRHEA: 0
BLOOD IN STOOL: 0
SINUS COMPLAINT: 1
DIARRHEA: 0
ABDOMINAL PAIN: 0
ORTHOPNEA: 0
COUGH: 0
WHEEZING: 0
SINUS PRESSURE: 1
APNEA: 0
SHORTNESS OF BREATH: 0
EYE PAIN: 0
NAUSEA: 0
BACK PAIN: 0
EYE ITCHING: 0

## 2020-07-22 NOTE — PROGRESS NOTES
TeleMedicine Video Visit    This visit was performed as a virtual video visit using a synchronous, two-way, audio-video telehealth technology platform. Patient identification was verified at the start of the visit, including the patient's telephone number and physical location. I discussed with the patient the nature of our telehealth visits, that:     1. Due to the nature of an audio- video modality, the only components of a physical exam that could be done are the elements supported by direct observation. 2. I would evaluate the patient and recommend diagnostics and treatments based on my assessment. 3. If it was felt that the patient should be evaluated in clinic or an emergency room setting, then they would be directed there. 4. Our sessions are not being recorded and that personal health information is protected. 5. Our team would provide follow up care in person if/when the patient needs it. Patient does agree to proceed with telemedicine consultation. Patient's location: home address in [place]  Physician  location other address in [facility] other people involved in call n/a      Time spent: Greater than 30    This visit was completed virtually using Doxy. me        Chief Complaint:     Chief Complaint   Patient presents with   [facility] Established New Doctor    Allergies     discuss new allergy medication    Sinus Problem    Hypertension    Fatigue    Generalized Body Aches         Sinus Problem   This is a recurrent problem. The current episode started more than 1 month ago. The problem has been waxing and waning since onset. There has been no fever. She is experiencing no pain. Associated symptoms include congestion, sinus pressure and sneezing. Pertinent negatives include no coughing, ear pain, headaches, hoarse voice, neck pain, shortness of breath or sore throat. Past treatments include nothing. The treatment provided no relief. Hypertension   This is a chronic problem.  The current episode started more than 1 month ago. The problem is unchanged. The problem is controlled. Associated symptoms include malaise/fatigue. Pertinent negatives include no chest pain, headaches, neck pain, orthopnea, palpitations, peripheral edema, PND or shortness of breath. There are no associated agents to hypertension. There are no known risk factors for coronary artery disease. Past treatments include beta blockers. The current treatment provides significant improvement. There is no history of CAD/MI, CVA or PVD. There is no history of a hypertension causing med, pheochromocytoma, renovascular disease, sleep apnea or a thyroid problem. Fatigue   This is a chronic problem. The current episode started more than 1 month ago. The problem occurs daily. The problem has been waxing and waning. Associated symptoms include arthralgias, congestion, fatigue, myalgias and weakness. Pertinent negatives include no abdominal pain, chest pain, coughing, fever, headaches, nausea, neck pain, numbness, rash, sore throat or vomiting. Nothing aggravates the symptoms. She has tried nothing for the symptoms. The treatment provided no relief. Generalized Body Aches   This is a chronic problem. The current episode started more than 1 year ago. The problem occurs daily. The problem has been waxing and waning. Associated symptoms include arthralgias, congestion, fatigue, myalgias and weakness. Pertinent negatives include no abdominal pain, chest pain, coughing, fever, headaches, nausea, neck pain, numbness, rash, sore throat or vomiting. Nothing aggravates the symptoms. She has tried nothing for the symptoms. The treatment provided no relief.        Patient Active Problem List   Diagnosis    Anemia    Hyponatremia    Hypokalemia    Acute gastritis without hemorrhage    PTSD (post-traumatic stress disorder)    Agoraphobia with panic attacks    Elevated liver function tests    Fibromyalgia    Lumbar disc disorder    GERD (gastroesophageal reflux disease)    Rheumatoid arthritis ([address]    Abnormal chest CT with groundglass opacities    Chronic back pain    Chronic abdominal pain    MDD (major depressive disorder), recurrent severe, without psychosis ([address]    Hip pain, bilateral    At risk for falling    Abnormality of gait and mobility    Chronic neck pain    Migraine without status migrainosus, not intractable    Essential hypertension    Vitamin D deficiency    Irritable bowel syndrome with constipation    Hot flashes due to surgical menopause    POTS (postural orthostatic tachycardia syndrome)    Tobacco use    Sx of RLS (restless legs syndrome)    Malabsorption syndrome    Chronic pain syndrome    Chronic bilateral low back pain without sciatica    Lumbosacral spondylosis without myelopathy    Mild intermittent asthma without complication       Past Medical History:   Diagnosis Date    Arthritis     Asthma exacerbation with COPD (chronic obstructive pulmonary disease) ([address] 2/13/2017    controlled with inhalers     Colitis 4/1/2012    Essential hypertension 2/22/2017    Fibromyalgia     GERD (gastroesophageal reflux disease)     Malabsorption syndrome     Neuromuscular disorder (HCC)     POTS (postural orthostatic tachycardia syndrome)     [facility]    Psychiatric problem     Rheumatoid arthritis ([address]     Seizures ([address]     due to Ultram per pt- last 2005     Sx of RLS (restless legs syndrome) 10/2/2016       Past Surgical History:   Procedure Laterality Date    ABDOMEN SURGERY  3/5/12    endometreosis    ANESTHESIA NERVE BLOCK Bilateral 6/16/2020    BILATERAL L3 L4 L5 MEDIAL NERVE BRANCH BLOCK   #1 performed by Stephanie Mireles DO at [address] [place] [address] Bilateral 7/14/2020    BILATERAL L3 4 5 MEDIAL NERVE BRANCH BLOCK # 2 performed by Stephanie Mireles DO at [address] [place] [address]  11/01/2016    BRONCHOSCOPY  12/14/2016    CHOLECYSTECTOMY  02/25/2016    lap    COLONOSCOPY      ENDOMETRIAL ABLATION      ENDOSCOPY, COLON, DIAGNOSTIC      HYSTERECTOMY      SMALL INTESTINE SURGERY      related to endometriosis    MARCELINO AND BSO      TONSILLECTOMY         Current Outpatient Medications   Medication Sig Dispense Refill    Acetaminophen (TYLENOL PO) Take by mouth      lidocaine 4 % external patch Place 1 patch onto the skin daily      albuterol sulfate HFA (VENTOLIN HFA) 108 (90 Base) MCG/ACT inhaler Inhale 1 puff into the lungs every 4 hours as needed for Wheezing 1 Inhaler 2    folic acid (FOLVITE) 1 MG tablet Take 1 tablet by mouth daily 30 tablet 5    cetirizine (ZYRTEC) 10 MG tablet Take 1 tablet by mouth daily 30 tablet 5    montelukast (SINGULAIR) 10 MG tablet Take 1 tablet by mouth daily 30 tablet 3    medical marijuana Take by mouth as needed.  lactated ringers infusion Infuse 1,000 mLs intravenously as needed      propranolol (INDERAL LA) 60 MG extended release capsule TAKE 1 CAPSULE BY MOUTH DAILY 90 capsule 0    EMGALITY 120 MG/ML SOSY every 30 days       famotidine (PEPCID) 40 MG tablet Take 40 mg by mouth 2 times daily       VYVANSE 60 MG CAPS Take 60 mg by mouth daily.  dexlansoprazole (DEXILANT) 60 MG CPDR delayed release capsule Take 1 capsule by mouth daily 30 capsule 1    alendronate (FOSAMAX) 70 MG tablet Take 70 mg by mouth      estradiol (ESTRACE) 0.5 MG tablet Take 0.5 mg by mouth daily      baclofen (LIORESAL) 20 MG tablet Take 20 mg by mouth 3 times daily      PAROXETINE HCL PO Take 15 mg by mouth nightly       LORazepam (ATIVAN) 1 MG tablet Take 1 mg by mouth 2 times daily  .       Probiotic Product (PROBIOTIC DAILY PO) Take 2 capsules by mouth every morning LD 7-12-20      amylase-lipase-protease, (CREON 6000) 6000 UNITS per capsule Take 2 capsules by mouth 3 times daily (with meals)       guaiFENesin (MUCINEX) 600 MG extended release tablet Take 1 tablet by mouth 2 times daily as needed for Congestion (Patient not taking: Reported on Relationship status: None    Intimate partner violence     Fear of current or ex partner: None     Emotionally abused: None     Physically abused: None     Forced sexual activity: None   Other Topics Concern    None   Social History Narrative    None       Family History   Problem Relation Age of Onset    High Blood Pressure Father     High Cholesterol Father     High Blood Pressure Mother     No Known Problems Sister           Review of Systems   Constitutional: Positive for fatigue and malaise/fatigue. Negative for activity change, appetite change and fever. HENT: Positive for congestion, sinus pressure and sneezing. Negative for ear pain, hearing loss, hoarse voice, nosebleeds, rhinorrhea and sore throat. Eyes: Negative for pain, redness, itching and visual disturbance. Respiratory: Negative for apnea, cough, chest tightness, shortness of breath and wheezing. Cardiovascular: Negative for chest pain, palpitations, orthopnea, leg swelling and PND. Gastrointestinal: Negative for abdominal pain, blood in stool, constipation, diarrhea, nausea and vomiting. Endocrine: Negative. Genitourinary: Negative for decreased urine volume, difficulty urinating, dysuria, frequency, hematuria and urgency. Musculoskeletal: Positive for arthralgias and myalgias. Negative for back pain, gait problem and neck pain. Skin: Negative for color change and rash. Allergic/Immunologic: Negative for environmental allergies and food allergies. Neurological: Positive for weakness. Negative for dizziness, light-headedness, numbness and headaches. Hematological: Negative for adenopathy. Does not bruise/bleed easily. Psychiatric/Behavioral: Negative for behavioral problems, dysphoric mood and sleep disturbance. The patient is not nervous/anxious and is not hyperactive. All other systems reviewed and are negative.       Patient-Reported Vitals 7/22/2020   Patient-Reported Weight 173lb   Patient-Reported Height 5 5        There were no vitals taken for this visit. Physical Exam  Vitals signs and nursing note reviewed. Constitutional:       General: She is not in acute distress. Appearance: Normal appearance. She is not toxic-appearing. Pulmonary:      Effort: Pulmonary effort is normal. No respiratory distress. Neurological:      Mental Status: She is alert and oriented to person, place, and time. Psychiatric:         Attention and Perception: Attention normal.         Mood and Affect: Mood and affect normal.         Speech: Speech normal.                                 ASSESSMENT/PLAN:    Patient Active Problem List   Diagnosis    Anemia    Hyponatremia    Hypokalemia    Acute gastritis without hemorrhage    PTSD (post-traumatic stress disorder)    Agoraphobia with panic attacks    Elevated liver function tests    Fibromyalgia    Lumbar disc disorder    GERD (gastroesophageal reflux disease)    Rheumatoid arthritis (HCC)    Abnormal chest CT with groundglass opacities    Chronic back pain    Chronic abdominal pain    MDD (major depressive disorder), recurrent severe, without psychosis ([address]    Hip pain, bilateral    At risk for falling    Abnormality of gait and mobility    Chronic neck pain    Migraine without status migrainosus, not intractable    Essential hypertension    Vitamin D deficiency    Irritable bowel syndrome with constipation    Hot flashes due to surgical menopause    POTS (postural orthostatic tachycardia syndrome)    Tobacco use    Sx of RLS (restless legs syndrome)    Malabsorption syndrome    Chronic pain syndrome    Chronic bilateral low back pain without sciatica    Lumbosacral spondylosis without myelopathy    Mild intermittent asthma without complication       Mandi was seen today for established new doctor, allergies, sinus problem, hypertension, fatigue and generalized body aches.     Diagnoses and all orders for this visit:    Essential hypertension  -     Comprehensive Metabolic Panel; Future  -     Lipid Panel; Future    Folate deficiency  -     folic acid (FOLVITE) 1 MG tablet; Take 1 tablet by mouth daily  -     CBC; Future  -     Vitamin B12 & Folate; Future    MDD (major depressive disorder), recurrent severe, without psychosis ([facility]    Mild intermittent asthma without complication    POTS (postural orthostatic tachycardia syndrome)  -     [address] [address]    Rheumatoid arthritis of multiple sites with negative rheumatoid factor ([facility]  -     [address] [address]    Lumbar disc disorder  -     [address] [address]    Anemia, unspecified type  -     CBC; Future  -     Comprehensive Metabolic Panel; Future  -     Ferritin; Future  -     Iron and TIBC; Future    Fibromyalgia  -     [address] [address]    PTSD (post-traumatic stress disorder)    Vitamin D deficiency  -     Vitamin D 25 Hydroxy; Future    Abnormality of gait and mobility  -     [address] [address]    Fatigue, unspecified type  -     CBC; Future  -     Vitamin B12 & Folate; Future  -     TSH without Reflex; Future  -     T4, Free; Future    Other orders  -     albuterol sulfate HFA (VENTOLIN HFA) 108 (90 Base) MCG/ACT inhaler; Inhale 1 puff into the lungs every 4 hours as needed for Wheezing  -     cetirizine (ZYRTEC) 10 MG tablet; Take 1 tablet by mouth daily  -     montelukast (SINGULAIR) 10 MG tablet; Take 1 tablet by mouth daily          Return in about 6 months (around 1/22/2021) for Recheck Meds, Recheck labs, Follow up HTN. I spent 30 minutes with this patient. I spent greater than 50% of the time counseling this patient.         Dashawn Erickson DO  7/22/2020  10:26 AM

## 2020-07-23 ENCOUNTER — TELEPHONE (OUTPATIENT)
Dept: PAIN MANAGEMENT | Age: 45
End: 2020-07-23

## 2020-07-24 ENCOUNTER — HOSPITAL ENCOUNTER (OUTPATIENT)
Age: 45
Discharge: HOME | End: 2020-07-26
Payer: MEDICAID

## 2020-07-24 ENCOUNTER — TELEPHONE (OUTPATIENT)
Dept: PAIN MANAGEMENT | Age: 45
End: 2020-07-24

## 2020-07-24 PROCEDURE — U0003 INFECTIOUS AGENT DETECTION BY NUCLEIC ACID (DNA OR RNA); SEVERE ACUTE RESPIRATORY SYNDROME CORONAVIRUS 2 (SARS-COV-2) (CORONAVIRUS DISEASE [COVID-19]), AMPLIFIED PROBE TECHNIQUE, MAKING USE OF HIGH THROUGHPUT TECHNOLOGIES AS DESCRIBED BY CMS-2020-01-R: HCPCS

## 2020-07-24 NOTE — PROGRESS NOTES
Have you been tested for COVID  7/24/20         Have you been told you were positive for COVID No  Have you had any known exposure to someone that is positive for COVID No  Do you have a cough                   No              Do you have shortness of breath No                 Do you have a sore throat            No                Are you having chills                    No                Are you having muscle aches. No                    Please come to the hospital wearing a mask and have your significant other wear a mask as well. Both of you should check your temperature before leaving to come here,  if it is 100 or higher please call [phone] for instruction. [facility]  INSTRUCTION       ? Parking the day of Surgery is located in the [facility]. Upon entering the door, make an immediate right to the surgery reception desk    ? Bring photo ID and insurance card     ? You may have a light breakfast day of procedure    ? Wear loose comfortable clothing    ? Please follow instructions for medications as given per Dr's office     ? Stop blood thinners as per Dr's office instructions    ? You can expect a call the business day prior to procedure to notify you if your arrival time changes    ?  Please arrange for

## 2020-07-26 LAB
SARS-COV-2: NOT DETECTED
SOURCE: NORMAL

## 2020-07-28 ENCOUNTER — HOSPITAL ENCOUNTER (OUTPATIENT)
Dept: GENERAL RADIOLOGY | Age: 45
Setting detail: OUTPATIENT SURGERY
Discharge: HOME | End: 2020-07-30
Attending: PAIN MEDICINE
Payer: MEDICAID

## 2020-07-28 ENCOUNTER — HOSPITAL ENCOUNTER (OUTPATIENT)
Age: 45
Setting detail: OUTPATIENT SURGERY
Discharge: HOME | End: 2020-07-28
Attending: PAIN MEDICINE | Admitting: PAIN MEDICINE
Payer: MEDICAID

## 2020-07-28 VITALS
RESPIRATION RATE: 18 BRPM | BODY MASS INDEX: 28.32 KG/M2 | TEMPERATURE: 97.1 F | DIASTOLIC BLOOD PRESSURE: 53 MMHG | HEART RATE: 84 BPM | HEIGHT: 65 IN | OXYGEN SATURATION: 92 % | WEIGHT: 170 LBS | SYSTOLIC BLOOD PRESSURE: 106 MMHG

## 2020-07-28 PROCEDURE — 6360000002 HC RX W HCPCS: Performed by: PAIN MEDICINE

## 2020-07-28 PROCEDURE — 3600000012 HC SURGERY LEVEL 2 ADDTL 15MIN: Performed by: PAIN MEDICINE

## 2020-07-28 PROCEDURE — 64635 DESTROY LUMB/SAC FACET JNT: CPT | Performed by: PAIN MEDICINE

## 2020-07-28 PROCEDURE — 3600000002 HC SURGERY LEVEL 2 BASE: Performed by: PAIN MEDICINE

## 2020-07-28 PROCEDURE — 2709999900 HC NON-CHARGEABLE SUPPLY: Performed by: PAIN MEDICINE

## 2020-07-28 PROCEDURE — 7100000010 HC PHASE II RECOVERY - FIRST 15 MIN: Performed by: PAIN MEDICINE

## 2020-07-28 PROCEDURE — 3209999900 FLUORO FOR SURGICAL PROCEDURES

## 2020-07-28 PROCEDURE — 7100000011 HC PHASE II RECOVERY - ADDTL 15 MIN: Performed by: PAIN MEDICINE

## 2020-07-28 PROCEDURE — 64636 DESTROY L/S FACET JNT ADDL: CPT | Performed by: PAIN MEDICINE

## 2020-07-28 PROCEDURE — 2500000003 HC RX 250 WO HCPCS: Performed by: PAIN MEDICINE

## 2020-07-28 RX ORDER — LIDOCAINE HYDROCHLORIDE 20 MG/ML
INJECTION, SOLUTION EPIDURAL; INFILTRATION; INTRACAUDAL; PERINEURAL PRN
Status: DISCONTINUED | OUTPATIENT
Start: 2020-07-28 | End: 2020-07-28 | Stop reason: ALTCHOICE

## 2020-07-28 RX ORDER — BUPIVACAINE HYDROCHLORIDE 2.5 MG/ML
INJECTION, SOLUTION EPIDURAL; INFILTRATION; INTRACAUDAL PRN
Status: DISCONTINUED | OUTPATIENT
Start: 2020-07-28 | End: 2020-07-28 | Stop reason: ALTCHOICE

## 2020-07-28 RX ORDER — METHYLPREDNISOLONE ACETATE 40 MG/ML
INJECTION, SUSPENSION INTRA-ARTICULAR; INTRALESIONAL; INTRAMUSCULAR; SOFT TISSUE PRN
Status: DISCONTINUED | OUTPATIENT
Start: 2020-07-28 | End: 2020-07-28 | Stop reason: ALTCHOICE

## 2020-07-28 ASSESSMENT — PAIN SCALES - GENERAL: PAINLEVEL_OUTOF10: 0

## 2020-07-28 ASSESSMENT — PAIN DESCRIPTION - LOCATION: LOCATION: BACK

## 2020-07-28 ASSESSMENT — PAIN DESCRIPTION - PAIN TYPE: TYPE: CHRONIC PAIN

## 2020-07-28 ASSESSMENT — PAIN - FUNCTIONAL ASSESSMENT: PAIN_FUNCTIONAL_ASSESSMENT: 0-10

## 2020-07-28 ASSESSMENT — PAIN DESCRIPTION - DESCRIPTORS: DESCRIPTORS: ACHING

## 2020-07-28 NOTE — OP NOTE
2020    Patient: Marychuy Escamilla  :  1975  Age:  45 y.o. Sex:  female     PRE-OPERATIVE DIAGNOSIS: Left Lumbar spondylosis, lumbar facet arthropathy. POST-OPERATIVE DIAGNOSIS: Same. PROCEDURE:  Fluoroscopic-guided Left  Lumbar facet medial branch radiofrequency ablation at levels L3,4,5 (anesthetizing the L4-5 and L5-S1 facet joints). SURGEON:  KIM Hinson. ANESTHESIA: local    ESTIMATED BLOOD LOSS: None.  ______________________________________________________________________  HISTORY & PHYSICAL: Marychuy Escamilla presents today for fluoroscopic-guided Left lumbar facet medial branch radiofrequency ablation at levels L3,4,5. The potential complications of the procedure were explained to [facility] again today and she has elected to undergo the aforementioned procedure. [facility] complete History & Physical examination were reviewed in depth, a copy of which is in the chart. DESCRIPTION OF PROCEDURE:    After confirming written and informed consent, a time-out was performed and [place] name and date of birth, the procedure to be performed as well as the plan for the location of the needle insertion were confirmed. The patient was brought into the procedure room and placed in the prone position on the fluoroscopy table. Standard monitors were placed and vital signs were observed throughout the procedure. The area of the lumbar spine and upper buttocks was sterilely prepped with chloraprep and draped in a sterile manner. AP fluoroscopy was used to identify and cordelia bartons point at the targeted area. A 22 gauge 10 mm radiofrequency probe was advanced toward each of these points. Once bone was contacted, negative aspiration for blood and CSF was confirmed, sensory stimulation was performed at 50 Hz and at 0.4 volts generating a pressure sensation. Motor stimulation < 2.0 volts elicited multifidus twitching without any radicular symptoms.  1 ml of 2% lidocaine was injected prior to lesioning,

## 2020-07-28 NOTE — H&P
GIN[facility] Pain Management        [address] [facility], [facility] [address]  Dept: [phone]    Procedure History & Physical      Angelina Else     HPI:    Patient  is here for left LBP for left L3,4,5 RFA  Labs/imaging studies reviewed   All question and concerns addressed including R/B/A associated with the procedure    Past Medical History:   Diagnosis Date    Anxiety     Arthritis     Asthma exacerbation with COPD (chronic obstructive pulmonary disease) ([address]     Colitis     Essential hypertension     Fibromyalgia     GERD (gastroesophageal reflux disease)     Major depression     Malabsorption syndrome     Neuromuscular disorder ([address]     POTS (postural orthostatic tachycardia syndrome)     PTSD (post-traumatic stress disorder)     Rheumatoid arthritis [facility]     Seizures ([facility] 2005    due to Ultram     Sx of RLS (restless legs syndrome)        Past Surgical History:   Procedure Laterality Date    ABDOMEN SURGERY  3/5/12    endometreosis    ANESTHESIA NERVE BLOCK Bilateral 6/16/2020    BILATERAL L3 L4 L5 MEDIAL NERVE BRANCH BLOCK   #1 performed by Helen Arellano DO at [address] [place] [address] Bilateral 7/14/2020    BILATERAL L3 4 5 MEDIAL NERVE BRANCH BLOCK # 2 performed by Helen Arellano DO at [address] [place] [address]  11/01/2016    BRONCHOSCOPY  12/14/2016    CHOLECYSTECTOMY  02/25/2016    lap    COLONOSCOPY      ENDOMETRIAL ABLATION      ENDOSCOPY, COLON, DIAGNOSTIC      HYSTERECTOMY      SMALL INTESTINE SURGERY      related to endometriosis    MARCELINO AND BSO      TONSILLECTOMY         Prior to Admission medications    Medication Sig Start Date End Date Taking?  Authorizing Provider   Acetaminophen (TYLENOL PO) Take by mouth   Yes Historical Provider, MD   albuterol sulfate HFA (VENTOLIN HFA) 108 (90 Base) MCG/ACT inhaler Inhale 1 puff into the lungs every 4 hours as needed for Wheezing 7/22/20  Yes Namita Wells DO   folic acid (FOLVITE) 1 MG MD       Allergies   Allergen Reactions    Codeine Hives    Demerol Hives    Morphine Hives     pt states that she has tolerated Dilaudid in the past w/o reaction (5/4/11)    Topiramate      Other reaction(s): Other: See Comments  heart palpitations    Tramadol Other (See Comments)     Other reaction(s):  Other: See Comments  also seizure     Casein Nausea And Vomiting    Eggs Or Egg-Derived Products Nausea And Vomiting    Gluten Meal Nausea And Vomiting    Influenza Vaccines      D/t egg allergy    Nsaids      Other reaction(s): GI Upset  H/o ulcers    Peanuts [Peanut Oil] Nausea And Vomiting    Prochlorperazine Edisylate Nausea And Vomiting    Trazodone And Nefazodone Other (See Comments)     Nasal sinus swelling    Whey Nausea And Vomiting       Social History     Socioeconomic History    Marital status:      Spouse name: Not on file    Number of children: Not on file    Years of education: Not on file    Highest education level: Not on file   Occupational History    Not on file   Social Needs    Financial resource strain: Not on file    Food insecurity     Worry: Not on file     Inability: Not on file    Transportation needs     Medical: Not on file     Non-medical: Not on file   Tobacco Use    Smoking status: Current Every Day Smoker     Packs/day: 0.50     Years: 15.00     Pack years: 7.50     Types: Cigarettes     Start date: 10/28/2004    Smokeless tobacco: Never Used    Tobacco comment: smokes about 5 cigarettes a day   Substance and Sexual Activity    Alcohol use: No    Drug use: Yes     Types: Marijuana     Comment: medical     Sexual activity: Not on file   Lifestyle    Physical activity     Days per week: Not on file     Minutes per session: Not on file    Stress: Not on file   Relationships    Social connections     Talks on phone: Not on file     Gets together: Not on file     Attends Sabianist service: Not on file     Active member of club or organization: Not on

## 2020-07-28 NOTE — PROGRESS NOTES
1015: Dr. Kanwal Majano notified, patient having left leg numbness and weakness. Discussed discharge instructions, verbalized understanding. 1059: Numbness, strength, has improved, not quite back to baseline. Continue to assess. 1140: Strength and numbness patient back to baseline.

## 2020-07-30 ENCOUNTER — TELEPHONE (OUTPATIENT)
Dept: PAIN MANAGEMENT | Age: 45
End: 2020-07-30

## 2020-07-30 NOTE — TELEPHONE ENCOUNTER
Does she still want to move forward with the other side or does she not feel as though she needs that?   Thanks

## 2020-08-06 ENCOUNTER — VIRTUAL VISIT (OUTPATIENT)
Dept: PAIN MANAGEMENT | Age: 45
End: 2020-08-06

## 2020-08-06 ENCOUNTER — HOSPITAL ENCOUNTER (OUTPATIENT)
Age: 45
Discharge: HOME | End: 2020-08-08
Payer: MEDICAID

## 2020-08-06 PROCEDURE — 99024 POSTOP FOLLOW-UP VISIT: CPT | Performed by: PAIN MEDICINE

## 2020-08-06 PROCEDURE — U0003 INFECTIOUS AGENT DETECTION BY NUCLEIC ACID (DNA OR RNA); SEVERE ACUTE RESPIRATORY SYNDROME CORONAVIRUS 2 (SARS-COV-2) (CORONAVIRUS DISEASE [COVID-19]), AMPLIFIED PROBE TECHNIQUE, MAKING USE OF HIGH THROUGHPUT TECHNOLOGIES AS DESCRIBED BY CMS-2020-01-R: HCPCS

## 2020-08-06 RX ORDER — DIAZEPAM 10 MG/1
TABLET ORAL
Qty: 1 TABLET | Refills: 0 | Status: SHIPPED
Start: 2020-08-06 | End: 2021-05-13 | Stop reason: SDUPTHER

## 2020-08-06 NOTE — PROGRESS NOTES
[facility]        [address], [facility] [address]  Dept: [phone]    Tele health follow up Note      Katlin Cooney     Date of Visit:  8/6/2020    CC:  Tele health follow up   Chief Complaint   Patient presents with    Follow-up     lower back to hips        Consent:  Telehealth Visit due to COVID 19 pandemic  The patient and/or health care decision maker is aware that he/she may receive a bill for this tele-health service [employer], depending on his insurance coverage, and has provided verbal consent to proceed: Yes  I have considered the risks of abuse, dependence, addiction and diversion. My patient is aware that they will need a follow-up visit (in-person or virtually) at the appropriate time indicated for continued medications. Further, my patient is aware that when this acute crisis has lifted, they will be expected to return for an in-person visit and all elements of standard local and hospital guidelines in order to continue this medication. Patient Location:Home   Physician Location: Other address in [place]    HPI:    Pain is better. Change in quality of symptoms:no. Medication side effects:not applicable . Recent diagnostic testing:none. Recent interventional procedures:LEFT L3,4,5 RFA with 80% relief    She has not been on anticoagulation medications to include ASA, NSAIDS, Plavix, heparin, LMW heparin and warfarin. The patient  has not been on herbal supplements. The patient is not diabetic. Imaging:   MRI lumbar spine 2/20/20 (per NSGY notes)  \"degenerative disc disease with herniated disc at L4-5. No significant central canal or neuroforaminal stenosis noted. \" (pt states done at [facility])        Potential Aberrant Drug-Related Behavior:      Urine Drug Screening:    OARRS report:    Past Medical History: Reviewed    Past Surgical History: Reviewed     Home Medications: Reviewed    Allergies: Reviewed     Social History: Reviewed     REVIEW OF SYSTEMS:     Mandi denies fever/chills, chest pain, shortness of breath, new bowel or bladder complaints. All other review of systems was negative. PHYSICAL EXAMINATION:      General:        A & O x3     HEENT:     Head:normocephalic and atraumatic  Sclera: icterus absent      Lungs:     Breathing:non-labored     Lumbar spine:     Range of motion:abnormal mildly in lateral bending, flexion, extension rotation bilateral and is  painful.     Musculoskeletal:     SLR:negative right, negative left, sitting      Extremities:     Tremors:None bilaterally upper  Range of motion:pain with internal rotation of hips positive. Intact: Yes     Neurological:     Motor:          No apparent weakness per patient       Gait:normal     Dermatology:     Skin:no unusual rashes and no skin lesions     Impression:     LBP  Lumbar MRI shows DDD without sig central or foraminal stenosis  Referred by NSGY for procedures  Extensive PMHx - RLS, chronic migraines, seizures, rheumatoid arthritis, POTS, GERD, fibromyalgia, HTN, CHANNING, colitis, and a neuromuscular disorder  Hx SCS TRIAL for endometriosis which was complicated by a MRSA infection tx with PICC line abx  Has tried baclofen, lidocaine patches, and medical THC with moderate relief    Starting to note some pain in region of SIJ, is having PT who has noted a potential leg length discrepancy vs rotational pelvic dysfunction  Will see her in the office next visit to assess this pain     Plan:  Urine screen deferred  OARRS report reviewed  Left L3,4,5 RFA with 80% relief, scheduled for the right (valium for presedation, needs )  Aquatic therapy - encouraged to start back  Patient encouraged to stay active and to lose weight  Treatment plan discussed with the patient including medications and procedure side effects     We discussed with the patient that combining opioids, benzodiazepines, alcohol, illicit drugs or sleep aids increases the risk of respiratory depression including death. We discussed that these medications may cause drowsiness, sedation or dizziness and have counseled the patient not to drive or operate machinery. We have discussed that these medications will be prescribed only by one provider. We have discussed with the patient about age related risk factors and have thoroughly discussed the importance of taking these medications as prescribed. The patient verbalizes understanding.     Patient advised regarding steps to help prevent the spread of COVID-19   SOURCE - https://carine-carcamo.info/. html     1-Stay home except to get medical care  2-Clean your hands often for atleast 20 seconds, avoid touching: Avoid touching your eyes, nose, and mouth with unwashed hands. 3-Seek medical attention: Seek prompt medical attention if your illness is worsening (e.g., difficulty breathing). Call you doctor first.  3-Wear a facemask if you are sick   4-Cover your coughs and sneezes           I affirm this is a Patient Initiated Episode with an established Patient who has not had a related appointment within my department in the past 7 days or scheduled within the next 24 hours.     Total Time: 15 minutes    Cc: Referring physician

## 2020-08-06 NOTE — PROGRESS NOTES
Ken Rivers was read the following message We want to confirm that, for purposes of billing, this is a virtual visit with your provider for which we will submit a claim for reimbursement with your insurance company. You will be responsible for any copays, coinsurance amounts or other amounts not covered by your insurance company. If you do not accept this, unfortunately we will not be able to schedule a virtual visit with the provider. Do you accept? Mandi responded Yes    Patient states she has had 80% relief with the procedure. Janeen Perales

## 2020-08-07 LAB
SARS-COV-2: NOT DETECTED
SOURCE: NORMAL

## 2020-08-21 ENCOUNTER — HOSPITAL ENCOUNTER (OUTPATIENT)
Age: 45
Discharge: HOME | End: 2020-08-23
Payer: MEDICAID

## 2020-08-21 PROCEDURE — U0003 INFECTIOUS AGENT DETECTION BY NUCLEIC ACID (DNA OR RNA); SEVERE ACUTE RESPIRATORY SYNDROME CORONAVIRUS 2 (SARS-COV-2) (CORONAVIRUS DISEASE [COVID-19]), AMPLIFIED PROBE TECHNIQUE, MAKING USE OF HIGH THROUGHPUT TECHNOLOGIES AS DESCRIBED BY CMS-2020-01-R: HCPCS

## 2020-08-22 LAB
SARS-COV-2: NOT DETECTED
SOURCE: NORMAL

## 2020-08-24 ENCOUNTER — TELEPHONE (OUTPATIENT)
Dept: PRIMARY CARE CLINIC | Age: 45
End: 2020-08-24

## 2020-08-25 NOTE — PROGRESS NOTES
[facility]  INSTRUCTIONS    . ..........................................................................................................................................       ? Parking the day of Surgery is located in the [facility]. Upon entering the door, make an immediate right to the surgery reception desk    ? Bring photo ID and insurance card     ? You may have a light breakfast day of procedure    ? Wear loose comfortable clothing    ? Please follow instructions for medications as given per Dr's office     ? Stop blood thinners as per Dr's office instructions    ? You can expect a call the business day prior to procedure to notify you if your arrival time changes    ? Please arrange for     ?   Other instructions

## 2020-08-25 NOTE — PROGRESS NOTES
Have you been tested for COVID  Yes           Have you been told you were positive for COVID No  Have you had any known exposure to someone that is positive for COVID No  Do you have a cough                   No              Do you have shortness of breath No                 Do you have a sore throat            No                Are you having chills                    No                Are you having muscle aches. No                    Please come to the hospital wearing a mask and have your significant other wear a mask as well. Both of you should check your temperature before leaving to come here,  if it is 100 or higher please call [phone] for instruction.

## 2020-08-27 ENCOUNTER — HOSPITAL ENCOUNTER (OUTPATIENT)
Dept: GENERAL RADIOLOGY | Age: 45
Setting detail: OUTPATIENT SURGERY
Discharge: HOME | End: 2020-08-29
Attending: PAIN MEDICINE
Payer: MEDICAID

## 2020-08-27 ENCOUNTER — HOSPITAL ENCOUNTER (OUTPATIENT)
Age: 45
Setting detail: OUTPATIENT SURGERY
Discharge: HOME | End: 2020-08-27
Attending: PAIN MEDICINE | Admitting: PAIN MEDICINE
Payer: MEDICAID

## 2020-08-27 VITALS
WEIGHT: 170 LBS | SYSTOLIC BLOOD PRESSURE: 138 MMHG | TEMPERATURE: 97.1 F | HEIGHT: 65 IN | OXYGEN SATURATION: 99 % | RESPIRATION RATE: 16 BRPM | DIASTOLIC BLOOD PRESSURE: 62 MMHG | BODY MASS INDEX: 28.32 KG/M2 | HEART RATE: 90 BPM

## 2020-08-27 PROCEDURE — 7100000011 HC PHASE II RECOVERY - ADDTL 15 MIN: Performed by: PAIN MEDICINE

## 2020-08-27 PROCEDURE — 64635 DESTROY LUMB/SAC FACET JNT: CPT | Performed by: PAIN MEDICINE

## 2020-08-27 PROCEDURE — 3600000012 HC SURGERY LEVEL 2 ADDTL 15MIN: Performed by: PAIN MEDICINE

## 2020-08-27 PROCEDURE — 3600000002 HC SURGERY LEVEL 2 BASE: Performed by: PAIN MEDICINE

## 2020-08-27 PROCEDURE — 2709999900 HC NON-CHARGEABLE SUPPLY: Performed by: PAIN MEDICINE

## 2020-08-27 PROCEDURE — 2500000003 HC RX 250 WO HCPCS: Performed by: PAIN MEDICINE

## 2020-08-27 PROCEDURE — 6370000000 HC RX 637 (ALT 250 FOR IP): Performed by: PAIN MEDICINE

## 2020-08-27 PROCEDURE — 7100000010 HC PHASE II RECOVERY - FIRST 15 MIN: Performed by: PAIN MEDICINE

## 2020-08-27 PROCEDURE — 6360000002 HC RX W HCPCS: Performed by: PAIN MEDICINE

## 2020-08-27 PROCEDURE — 64636 DESTROY L/S FACET JNT ADDL: CPT | Performed by: PAIN MEDICINE

## 2020-08-27 PROCEDURE — 3209999900 FLUORO FOR SURGICAL PROCEDURES

## 2020-08-27 RX ORDER — ACETAMINOPHEN 325 MG/1
650 TABLET ORAL ONCE
Status: COMPLETED | OUTPATIENT
Start: 2020-08-27 | End: 2020-08-27

## 2020-08-27 RX ORDER — LIDOCAINE HYDROCHLORIDE 20 MG/ML
INJECTION, SOLUTION EPIDURAL; INFILTRATION; INTRACAUDAL; PERINEURAL PRN
Status: DISCONTINUED | OUTPATIENT
Start: 2020-08-27 | End: 2020-08-27 | Stop reason: ALTCHOICE

## 2020-08-27 RX ORDER — METHYLPREDNISOLONE ACETATE 40 MG/ML
INJECTION, SUSPENSION INTRA-ARTICULAR; INTRALESIONAL; INTRAMUSCULAR; SOFT TISSUE PRN
Status: DISCONTINUED | OUTPATIENT
Start: 2020-08-27 | End: 2020-08-27 | Stop reason: ALTCHOICE

## 2020-08-27 RX ORDER — BUPIVACAINE HYDROCHLORIDE 2.5 MG/ML
INJECTION, SOLUTION EPIDURAL; INFILTRATION; INTRACAUDAL PRN
Status: DISCONTINUED | OUTPATIENT
Start: 2020-08-27 | End: 2020-08-27 | Stop reason: ALTCHOICE

## 2020-08-27 RX ORDER — HYDROCODONE BITARTRATE AND ACETAMINOPHEN 5; 325 MG/1; MG/1
1 TABLET ORAL ONCE
Status: COMPLETED | OUTPATIENT
Start: 2020-08-27 | End: 2020-08-27

## 2020-08-27 RX ADMIN — HYDROCODONE BITARTRATE AND ACETAMINOPHEN 1 TABLET: 5; 325 TABLET ORAL at 12:12

## 2020-08-27 RX ADMIN — ACETAMINOPHEN 650 MG: 325 TABLET ORAL at 11:51

## 2020-08-27 ASSESSMENT — PAIN DESCRIPTION - DESCRIPTORS: DESCRIPTORS: ACHING

## 2020-08-27 ASSESSMENT — PAIN SCALES - GENERAL
PAINLEVEL_OUTOF10: 10
PAINLEVEL_OUTOF10: 10

## 2020-08-27 ASSESSMENT — PAIN - FUNCTIONAL ASSESSMENT: PAIN_FUNCTIONAL_ASSESSMENT: 0-10

## 2020-08-27 NOTE — PROGRESS NOTES
Patient ambulated to bathroom with cane and assistance  Still complaints of pain at injection site however improved to 6/10.  Dr Manette Holter evaluated again prior to discharge and patient stable for discharge home

## 2020-08-27 NOTE — OP NOTE
2020    Patient: Masoud Mercedes  :  1975  Age:  45 y.o. Sex:  female     PRE-OPERATIVE DIAGNOSIS: Right Lumbar spondylosis, lumbar facet arthropathy. POST-OPERATIVE DIAGNOSIS: Same. PROCEDURE:  Fluoroscopic-guided Right  Lumbar facet medial branch radiofrequency ablation at levels L3,4,5 (anesthetizing the L4-5 and L5-S1 facet joints). SURGEON:  KIM Marquez. ANESTHESIA: local    ESTIMATED BLOOD LOSS: None.  ______________________________________________________________________  HISTORY & PHYSICAL: Masoud Mercedes presents today for fluoroscopic-guided Right lumbar facet medial branch radiofrequency ablation at levels L3,4,5. The potential complications of the procedure were explained to [facility] again today and she has elected to undergo the aforementioned procedure. [facility] complete History & Physical examination were reviewed in depth, a copy of which is in the chart. DESCRIPTION OF PROCEDURE:    After confirming written and informed consent, a time-out was performed and [facility] name and date of birth, the procedure to be performed as well as the plan for the location of the needle insertion were confirmed. The patient was brought into the procedure room and placed in the prone position on the fluoroscopy table. Standard monitors were placed and vital signs were observed throughout the procedure. The area of the lumbar spine and upper buttocks was sterilely prepped with chloraprep and draped in a sterile manner. AP fluoroscopy was used to identify and cordelia bartons point at the targeted area. A 22 gauge 10 mm radiofrequency probe was advanced toward each of these points. Once bone was contacted, negative aspiration for blood and CSF was confirmed, sensory stimulation was performed at 50 Hz and at 0.4 volts generating a pressure sensation. Motor stimulation < 2.0 volts elicited multifidus twitching without any radicular symptoms.  1 ml of 2% lidocaine was injected prior to lesioning, which was performed for 90 seconds at 90 degrees centigrade. Once the lesions were complete, a solution of 0.25% marcaine 3 cc and 40 mg DepoMedrol was injected and distributed equally through each probe. The probes were removed . The patient's back was cleaned and bandages were placed over the needle insertion sites. Disposition the patient tolerated the procedure well and there were no complications . Vital signs remained stable throughout the procedure. The patient was escorted to the recovery area where they remained until discharge and written discharge instructions for the procedure were given. Plan: [facility] will return to our pain management center as scheduled.      Taj Ayala DO

## 2020-08-27 NOTE — H&P
Yes Historical Provider, MD   lidocaine 4 % external patch Place 1 patch onto the skin daily   Yes Historical Provider, MD   albuterol sulfate HFA (VENTOLIN HFA) 108 (90 Base) MCG/ACT inhaler Inhale 1 puff into the lungs every 4 hours as needed for Wheezing 7/22/20  Yes Jake Barton, DO   folic acid (FOLVITE) 1 MG tablet Take 1 tablet by mouth daily 7/22/20  Yes Jignesh Marcano, DO   cetirizine (ZYRTEC) 10 MG tablet Take 1 tablet by mouth daily 7/22/20  Yes Jake Barton, DO   medical marijuana Take by mouth as needed. Yes Historical Provider, MD   lactated ringers infusion Infuse 1,000 mLs intravenously as needed   Yes Historical Provider, MD   propranolol (INDERAL LA) 60 MG extended release capsule TAKE 1 CAPSULE BY MOUTH DAILY 6/22/20  Yes Kinjal Romero APRN - CNP   EMGALITY 120 MG/ML SOSY every 30 days  10/1/19  Yes Historical Provider, MD   famotidine (PEPCID) 40 MG tablet Take 40 mg by mouth 2 times daily  10/1/19  Yes Historical Provider, MD   VYVANSE 60 MG CAPS Take 60 mg by mouth daily. 8/3/19  Yes Historical Provider, MD   guaiFENesin (MUCINEX) 600 MG extended release tablet Take 1 tablet by mouth 2 times daily as needed for Congestion 7/2/19  Yes Jan Cheatham APRN - CNP   dexlansoprazole ([address] [place] [address]) 60 MG CPDR delayed release capsule Take 1 capsule by mouth daily 12/17/18  Yes Marcial Baig,    alendronate (FOSAMAX) 70 MG tablet Take 70 mg by mouth   Yes Historical Provider, MD   estradiol (ESTRACE) 0.5 MG tablet Take 0.5 mg by mouth daily   Yes Historical Provider, MD   baclofen (LIORESAL) 20 MG tablet Take 20 mg by mouth 3 times daily   Yes Historical Provider, MD   PAROXETINE HCL PO Take 15 mg by mouth nightly    Yes Historical Provider, MD   LORazepam (ATIVAN) 1 MG tablet Take 1 mg by mouth 2 times daily  .  8/14/16  Yes Historical Provider, MD   Probiotic Product (PROBIOTIC DAILY PO) Take 2 capsules by mouth every morning LD 7-12-20   Yes Historical Provider, MD amylase-lipase-protease, (CREON 6000) 6000 UNITS per capsule Take 2 capsules by mouth 3 times daily (with meals)    Yes Historical Provider, MD       Allergies   Allergen Reactions    Codeine Hives    Demerol Hives    Morphine Hives     pt states that she has tolerated Dilaudid in the past w/o reaction (5/4/11)    Topiramate      Other reaction(s): Other: See Comments  heart palpitations    Tramadol Other (See Comments)     Other reaction(s):  Other: See Comments  also seizure     Casein Nausea And Vomiting    Eggs Or Egg-Derived Products Nausea And Vomiting    Gluten Meal Nausea And Vomiting    Influenza Vaccines      D/t egg allergy    Nsaids      Other reaction(s): GI Upset  H/o ulcers    Peanuts [Peanut Oil] Nausea And Vomiting    Prochlorperazine Edisylate Nausea And Vomiting    Trazodone And Nefazodone Other (See Comments)     Nasal sinus swelling    Whey Nausea And Vomiting       Social History     Socioeconomic History    Marital status:      Spouse name: Not on file    Number of children: Not on file    Years of education: Not on file    Highest education level: Not on file   Occupational History    Not on file   Social Needs    Financial resource strain: Not on file    Food insecurity     Worry: Not on file     Inability: Not on file    Transportation needs     Medical: Not on file     Non-medical: Not on file   Tobacco Use    Smoking status: Current Every Day Smoker     Packs/day: 0.50     Years: 15.00     Pack years: 7.50     Types: Cigarettes     Start date: 10/28/2004    Smokeless tobacco: Never Used    Tobacco comment: smokes about 5 cigarettes a day   Substance and Sexual Activity    Alcohol use: No    Drug use: Yes     Types: Marijuana     Comment: medical     Sexual activity: Not on file   Lifestyle    Physical activity     Days per week: Not on file     Minutes per session: Not on file    Stress: Not on file   Relationships    Social connections     Talks on phone: Not on file     Gets together: Not on file     Attends Alevism service: Not on file     Active member of club or organization: Not on file     Attends meetings of clubs or organizations: Not on file     Relationship status: Not on file    Intimate partner violence     Fear of current or ex partner: Not on file     Emotionally abused: Not on file     Physically abused: Not on file     Forced sexual activity: Not on file   Other Topics Concern    Not on file   Social History Narrative    Not on file       Family History   Problem Relation Age of Onset    High Blood Pressure Father     High Cholesterol Father     High Blood Pressure Mother     No Known Problems Sister          REVIEW OF SYSTEMS:    CONSTITUTIONAL:  negative for  fevers, chills, sweats and fatigue    RESPIRATORY:  negative for  dry cough, cough with sputum, dyspnea, wheezing and chest pain    CARDIOVASCULAR:  negative for chest pain, dyspnea, palpitations, syncope    GASTROINTESTINAL:  negative for nausea, vomiting, change in bowel habits, diarrhea, constipation and abdominal pain    MUSCULOSKELETAL: negative for muscle weakness    SKIN: negative for itching or rashes. BEHAVIOR/PSYCH:  negative for poor appetite, increased appetite, decreased sleep and poor concentration    All other systems negative      PHYSICAL EXAM:    VITALS:  BP (!) 166/93   Pulse 105   Temp 97.1 °F (36.2 °C) (Temporal)   Resp 14   Ht 5' 5\" (1.651 m)   Wt 170 lb (77.1 kg)   SpO2 100%   BMI 28.29 kg/m²     CONSTITUTIONAL:  awake, alert, cooperative, no apparent distress, and appears stated age    EYES: PERRLA, EOMI    LUNGS:  No increased work of breathing, no audible wheezing    CARDIOVASCULAR:  regular rate and rhythm    ABDOMEN:  Soft non tender non distended     EXTREMITIES: no signs of clubbing or cyanosis. MUSCULOSKELETAL: negative for flaccid muscle tone or spastic movements.     SKIN: gross examination reveals no signs of rashes, or diaphoresis. NEURO: Cranial nerves II-XII grossly intact. No signs of agitated mood.        Assessment/Plan:    Right LB pain for right L3,4,5 RFA

## 2020-08-27 NOTE — PROGRESS NOTES
Pt stated when she moved to get dressed, she felt an \"excrutiating pain\" rated 10/10 at injection site of ablation. Pt discontinued ice pack . Pt assisted to lie back on cart and reposition. Dr Alexander Garcia notified of c/o pain. Warm blanket placed on back.

## 2020-08-28 ENCOUNTER — TELEPHONE (OUTPATIENT)
Dept: PAIN MANAGEMENT | Age: 45
End: 2020-08-28

## 2020-09-18 ENCOUNTER — OFFICE VISIT (OUTPATIENT)
Dept: PAIN MANAGEMENT | Age: 45
End: 2020-09-18
Payer: MEDICAID

## 2020-09-18 VITALS
WEIGHT: 170 LBS | RESPIRATION RATE: 18 BRPM | OXYGEN SATURATION: 98 % | HEART RATE: 101 BPM | TEMPERATURE: 97 F | BODY MASS INDEX: 28.32 KG/M2 | SYSTOLIC BLOOD PRESSURE: 124 MMHG | HEIGHT: 65 IN | DIASTOLIC BLOOD PRESSURE: 74 MMHG

## 2020-09-18 PROCEDURE — 99213 OFFICE O/P EST LOW 20 MIN: CPT

## 2020-09-18 PROCEDURE — 99213 OFFICE O/P EST LOW 20 MIN: CPT | Performed by: PAIN MEDICINE

## 2020-09-18 NOTE — PROGRESS NOTES
 BRONCHOSCOPY  12/14/2016    CHOLECYSTECTOMY  02/25/2016    lap    COLONOSCOPY      ENDOMETRIAL ABLATION      ENDOSCOPY, COLON, DIAGNOSTIC      HYSTERECTOMY      PAIN MANAGEMENT PROCEDURE Left 7/28/2020    LEFT L3 4 5 MEDIAL NERVE BRANCH RADIOFREQUENCY ABLATION performed by Cas North DO at [address] [facility] Right 8/27/2020    RIGHT L3 4 5 MEDIAL NERVE BRANCH RADIOFREQUENCY ABLATION performed by Cas North DO at [address]      related to endometriosis    MARCELINO AND BSO      TONSILLECTOMY         Prior to Admission medications    Medication Sig Start Date End Date Taking? Authorizing Provider   Acetaminophen (TYLENOL PO) Take by mouth   Yes Historical Provider, MD   lidocaine 4 % external patch Place 1 patch onto the skin daily   Yes Historical Provider, MD   albuterol sulfate HFA (VENTOLIN HFA) 108 (90 Base) MCG/ACT inhaler Inhale 1 puff into the lungs every 4 hours as needed for Wheezing 7/22/20  Yes Betsy Torres DO   folic acid (FOLVITE) 1 MG tablet Take 1 tablet by mouth daily 7/22/20  Yes Jignesh Marcano DO   cetirizine (ZYRTEC) 10 MG tablet Take 1 tablet by mouth daily 7/22/20  Yes Betsy Torres DO   medical marijuana Take by mouth as needed. Yes Historical Provider, MD   lactated ringers infusion Infuse 1,000 mLs intravenously as needed   Yes Historical Provider, MD   propranolol (INDERAL LA) 60 MG extended release capsule TAKE 1 CAPSULE BY MOUTH DAILY 6/22/20  Yes JESSE Tamayo CNP   EMGALITY 120 MG/ML SOSY every 30 days  10/1/19  Yes Historical Provider, MD   famotidine (PEPCID) 40 MG tablet Take 40 mg by mouth 2 times daily  10/1/19  Yes Historical Provider, MD   VYVANSE 60 MG CAPS Take 60 mg by mouth daily.   8/3/19  Yes Historical Provider, MD   guaiFENesin (MUCINEX) 600 MG extended release tablet Take 1 tablet by mouth 2 times daily as needed for Congestion 7/2/19  Yes JESSE Stanley CNP   dexlansoprazole (DEXILANT) 60 MG CPDR delayed release capsule Take 1 capsule by mouth daily 12/17/18  Yes Gaye Mcnamara,    alendronate (FOSAMAX) 70 MG tablet Take 70 mg by mouth   Yes Historical Provider, MD   estradiol (ESTRACE) 0.5 MG tablet Take 0.5 mg by mouth daily   Yes Historical Provider, MD   baclofen (LIORESAL) 20 MG tablet Take 20 mg by mouth 3 times daily   Yes Historical Provider, MD   PAROXETINE HCL PO Take 15 mg by mouth nightly    Yes Historical Provider, MD   LORazepam (ATIVAN) 1 MG tablet Take 1 mg by mouth 2 times daily  . 8/14/16  Yes Historical Provider, MD   Probiotic Product (PROBIOTIC DAILY PO) Take 2 capsules by mouth every morning LD 7-12-20   Yes Historical Provider, MD   amylase-lipase-protease, (CREON 6000) 6000 UNITS per capsule Take 2 capsules by mouth 3 times daily (with meals)    Yes Historical Provider, MD       Allergies   Allergen Reactions    Codeine Hives    Demerol Hives    Morphine Hives     pt states that she has tolerated Dilaudid in the past w/o reaction (5/4/11)    Topiramate      Other reaction(s): Other: See Comments  heart palpitations    Tramadol Other (See Comments)     Other reaction(s):  Other: See Comments  also seizure     Casein Nausea And Vomiting    Eggs Or Egg-Derived Products Nausea And Vomiting    Gluten Meal Nausea And Vomiting    Influenza Vaccines      D/t egg allergy    Nsaids      Other reaction(s): GI Upset  H/o ulcers    Peanuts [Peanut Oil] Nausea And Vomiting    Prochlorperazine Edisylate Nausea And Vomiting    Trazodone And Nefazodone Other (See Comments)     Nasal sinus swelling    Whey Nausea And Vomiting       Social History     Socioeconomic History    Marital status:      Spouse name: Not on file    Number of children: Not on file    Years of education: Not on file    Highest education level: Not on file   Occupational History    Not on file   Social Needs    Financial resource strain: Not on file    Food insecurity Worry: Not on file     Inability: Not on file    Transportation needs     Medical: Not on file     Non-medical: Not on file   Tobacco Use    Smoking status: Current Every Day Smoker     Packs/day: 0.50     Years: 15.00     Pack years: 7.50     Types: Cigarettes     Start date: 10/28/2004    Smokeless tobacco: Never Used    Tobacco comment: smokes about 5 cigarettes a day   Substance and Sexual Activity    Alcohol use: No    Drug use: Yes     Types: Marijuana     Comment: medical     Sexual activity: Not on file   Lifestyle    Physical activity     Days per week: Not on file     Minutes per session: Not on file    Stress: Not on file   Relationships    Social connections     Talks on phone: Not on file     Gets together: Not on file     Attends Yarsani service: Not on file     Active member of club or organization: Not on file     Attends meetings of clubs or organizations: Not on file     Relationship status: Not on file    Intimate partner violence     Fear of current or ex partner: Not on file     Emotionally abused: Not on file     Physically abused: Not on file     Forced sexual activity: Not on file   Other Topics Concern    Not on file   Social History Narrative    Not on file       Family History   Problem Relation Age of Onset    High Blood Pressure Father     High Cholesterol Father     High Blood Pressure Mother     No Known Problems Sister        REVIEW OF SYSTEMS:     Mandi denies fever/chills, chest pain, shortness of breath, new bowel or bladder complaints. All other review of systems was negative.     PHYSICAL EXAMINATION:      /74   Pulse 101   Temp 97 °F (36.1 °C)   Resp 18   Ht 5' 5\" (1.651 m)   Wt 170 lb (77.1 kg)   SpO2 98%   BMI 28.29 kg/m²     General:      General appearance:pleasant and well-hydrated, in no distress and A & O x3  Build:Overweight  Function:Rises from a seated position with difficulty    HEENT:    Head:normocephalic, atraumatic  Pupils:regular, round, equal  Sclera: icterus absent    Lungs:    Breathing:normal breathing pattern    Abdomen:    Shape:non-distended  Tenderness:none  Guarding:none    Lumbar spine:    Spine inspection:normal   CVA tenderness:No   Palpation:tenderness paravertebral muscles, left, right negative. Range of motion:abnormal mildly in lateral bending, flexion, extension rotation bilateral and is painful. Musculoskeletal:    Trigger points in Paraveteral:absent bilaterally  SI joint tenderness:positive right, positive left              MARGY test:positive right, positive left  Piriformis tenderness:negative right, negative left  Trochanteric bursa tenderness:negative right, negative left  SLR:negative right, negative left, sitting     Extremities:    Tremors:None bilaterally upper and lower  Range of motion:pain with internal rotation of hips negative.   Intact:Yes  Varicose veins:absent   Pulses:present Lt radial  Cyanosis:none  Edema:none x all 4 extremities    Neurological:    Sensory:normal to light touch BLE  Motor:                Right Quadriceps5/5          Left Quadriceps5/5           Right Gastrocnemius5/5    Left Gastrocnemius5/5  Right Ant Tibialis5/5  Left Ant Tibialis5/5    Reflexes:    Right Quadriceps reflex2+  Left Quadriceps reflex2+  Right Achilles reflex2+  Left Achilles reflex2+  Gait:normal station, with a quad cane    Dermatology:    Skin:no rashes or lesions noted     Impression:     LBP  Lumbar MRI shows DDD without sig central or foraminal stenosis  Referred by NSGY for procedures  Extensive PMHx - RLS, chronic migraines, seizures, rheumatoid arthritis, POTS, GERD, fibromyalgia, HTN, CHANNING, colitis, and a neuromuscular disorder  Hx SCS TRIAL for endometriosis which was complicated by a MRSA infection tx with PICC line abx  Has tried baclofen, lidocaine patches, and medical THC with moderate relief      Plan:  Urine screen deferred  [facility] report reviewed  Left then right L3,4,5 RFA with 80% relief (valium for

## 2020-09-18 NOTE — PROGRESS NOTES
Do you currently have any of the following:    Fever: No  Headache:  No  Cough: No  Shortness of breath: No  Exposed to anyone with these symptoms: No         Mandi Merino presents to the [address] [place] [address] on 9/18/2020. Waleska Vyas is complaining of pain IN THE BACK . The pain is constant. The pain is described as aching, throbbing, shooting and stabbing. Pain is rated on her best day at a 6, on her worst day at a 7, and on average at a 6 on the VAS scale. She took her last dose of Tylenol and medical marijuana . Any procedures since your last visit: Yes, with 75-80   % relief. Pacemaker or defibrilator: No managed by none. She is not on NSAIDS and is not on anticoagulation medications  /74   Pulse 101   Temp 97 °F (36.1 °C)   Resp 18   Ht 5' 5\" (1.651 m)   Wt 170 lb (77.1 kg)   SpO2 98%   BMI 28.29 kg/m²      No LMP recorded. Patient has had a hysterectomy.

## 2020-09-23 ENCOUNTER — TELEPHONE (OUTPATIENT)
Dept: PRIMARY CARE CLINIC | Age: 45
End: 2020-09-23

## 2020-09-23 NOTE — TELEPHONE ENCOUNTER
Call received from [facility] from [facility]. She stated patient has been discharged from all home care. Any questions, she can be reached at [phone].

## 2020-10-26 RX ORDER — PROPRANOLOL HCL 60 MG
60 CAPSULE, EXTENDED RELEASE 24HR ORAL DAILY
Qty: 90 CAPSULE | Refills: 0 | Status: SHIPPED
Start: 2020-10-26 | End: 2021-01-21

## 2020-12-21 ENCOUNTER — OFFICE VISIT (OUTPATIENT)
Dept: PRIMARY CARE CLINIC | Age: 45
End: 2020-12-21
Payer: MEDICAID

## 2020-12-21 VITALS
DIASTOLIC BLOOD PRESSURE: 76 MMHG | SYSTOLIC BLOOD PRESSURE: 134 MMHG | BODY MASS INDEX: 31.32 KG/M2 | HEART RATE: 68 BPM | RESPIRATION RATE: 16 BRPM | WEIGHT: 188 LBS | HEIGHT: 65 IN | OXYGEN SATURATION: 98 % | TEMPERATURE: 97.2 F

## 2020-12-21 DIAGNOSIS — I10 ESSENTIAL HYPERTENSION: ICD-10-CM

## 2020-12-21 PROBLEM — Z72.0 TOBACCO USE: Status: RESOLVED | Noted: 2017-11-15 | Resolved: 2020-12-21

## 2020-12-21 PROBLEM — G89.29 CHRONIC NECK PAIN: Status: RESOLVED | Noted: 2017-02-22 | Resolved: 2020-12-21

## 2020-12-21 PROBLEM — M54.2 CHRONIC NECK PAIN: Status: RESOLVED | Noted: 2017-02-22 | Resolved: 2020-12-21

## 2020-12-21 PROBLEM — G89.4 CHRONIC PAIN SYNDROME: Status: RESOLVED | Noted: 2020-05-27 | Resolved: 2020-12-21

## 2020-12-21 PROBLEM — E89.41 HOT FLASHES DUE TO SURGICAL MENOPAUSE: Status: RESOLVED | Noted: 2017-08-22 | Resolved: 2020-12-21

## 2020-12-21 PROBLEM — M54.50 CHRONIC BILATERAL LOW BACK PAIN WITHOUT SCIATICA: Status: RESOLVED | Noted: 2020-05-27 | Resolved: 2020-12-21

## 2020-12-21 PROBLEM — G89.29 CHRONIC BILATERAL LOW BACK PAIN WITHOUT SCIATICA: Status: RESOLVED | Noted: 2020-05-27 | Resolved: 2020-12-21

## 2020-12-21 LAB
ALBUMIN SERPL-MCNC: 4.7 G/DL (ref 3.5–5.2)
ALP BLD-CCNC: 57 U/L (ref 35–104)
ALT SERPL-CCNC: 46 U/L (ref 0–32)
ANION GAP SERPL CALCULATED.3IONS-SCNC: 20 MMOL/L (ref 7–16)
AST SERPL-CCNC: 24 U/L (ref 0–31)
BILIRUB SERPL-MCNC: 0.5 MG/DL (ref 0–1.2)
BUN BLDV-MCNC: 9 MG/DL (ref 6–20)
CALCIUM SERPL-MCNC: 10.1 MG/DL (ref 8.6–10.2)
CHLORIDE BLD-SCNC: 104 MMOL/L (ref 98–107)
CHOLESTEROL, TOTAL: 230 MG/DL (ref 0–199)
CO2: 20 MMOL/L (ref 22–29)
CREAT SERPL-MCNC: 0.9 MG/DL (ref 0.5–1)
GFR AFRICAN AMERICAN: >60
GFR NON-AFRICAN AMERICAN: >60 ML/MIN/1.73
GLUCOSE BLD-MCNC: 89 MG/DL (ref 74–99)
HCT VFR BLD CALC: 47.3 % (ref 34–48)
HDLC SERPL-MCNC: 62 MG/DL
HEMOGLOBIN: 15.2 G/DL (ref 11.5–15.5)
LDL CHOLESTEROL CALCULATED: 134 MG/DL (ref 0–99)
MCH RBC QN AUTO: 31.1 PG (ref 26–35)
MCHC RBC AUTO-ENTMCNC: 32.1 % (ref 32–34.5)
MCV RBC AUTO: 96.7 FL (ref 80–99.9)
PDW BLD-RTO: 14.3 FL (ref 11.5–15)
PLATELET # BLD: 355 E9/L (ref 130–450)
PMV BLD AUTO: 9.8 FL (ref 7–12)
POTASSIUM SERPL-SCNC: 3.6 MMOL/L (ref 3.5–5)
RBC # BLD: 4.89 E12/L (ref 3.5–5.5)
SODIUM BLD-SCNC: 144 MMOL/L (ref 132–146)
TOTAL PROTEIN: 7.8 G/DL (ref 6.4–8.3)
TRIGL SERPL-MCNC: 170 MG/DL (ref 0–149)
TSH SERPL DL<=0.05 MIU/L-ACNC: 1.49 UIU/ML (ref 0.27–4.2)
VLDLC SERPL CALC-MCNC: 34 MG/DL
WBC # BLD: 6.5 E9/L (ref 4.5–11.5)

## 2020-12-21 PROCEDURE — 90471 IMMUNIZATION ADMIN: CPT | Performed by: FAMILY MEDICINE

## 2020-12-21 PROCEDURE — 99214 OFFICE O/P EST MOD 30 MIN: CPT | Performed by: FAMILY MEDICINE

## 2020-12-21 PROCEDURE — 90686 IIV4 VACC NO PRSV 0.5 ML IM: CPT | Performed by: FAMILY MEDICINE

## 2020-12-21 RX ORDER — CIMETIDINE 300 MG/1
300 TABLET, FILM COATED ORAL 2 TIMES DAILY
COMMUNITY

## 2020-12-21 RX ORDER — DOCUSATE SODIUM 100 MG/1
100 CAPSULE, LIQUID FILLED ORAL 2 TIMES DAILY
COMMUNITY
End: 2021-05-12

## 2020-12-21 RX ORDER — SENNOSIDES 8.6 MG
1 TABLET ORAL DAILY
COMMUNITY
End: 2021-05-12

## 2020-12-21 ASSESSMENT — ENCOUNTER SYMPTOMS
ORTHOPNEA: 0
SINUS PRESSURE: 0
APNEA: 0
SORE THROAT: 0
COUGH: 0
RHINORRHEA: 0
EYE REDNESS: 0
DIARRHEA: 0
EYE PAIN: 0
CHEST TIGHTNESS: 0
COLOR CHANGE: 0
SHORTNESS OF BREATH: 0
VOMITING: 0
EYE ITCHING: 0
ABDOMINAL PAIN: 0
CONSTIPATION: 0
NAUSEA: 0
WHEEZING: 0
BACK PAIN: 0
BLOOD IN STOOL: 0

## 2020-12-21 NOTE — PROGRESS NOTES
Chief Complaint:     Chief Complaint   Patient presents with    Joint Pain     has RA, has noticed changes over the last few months, hands, wrists, and ankles. feels like something is going on    Referral - General     discuss new rheumatologist     Generalized Body Aches    Fatigue         Generalized Body Aches  This is a chronic problem. The current episode started more than 1 year ago. The problem occurs daily. The problem has been waxing and waning. Associated symptoms include arthralgias, fatigue, myalgias and weakness. Pertinent negatives include no abdominal pain, chest pain, congestion, coughing, diaphoresis, fever, headaches, nausea, neck pain, numbness, rash, sore throat or vomiting. Nothing aggravates the symptoms. She has tried nothing for the symptoms. The treatment provided no relief. Fatigue  This is a chronic problem. The current episode started more than 1 month ago. The problem occurs daily. The problem has been waxing and waning. Associated symptoms include arthralgias, fatigue, myalgias and weakness. Pertinent negatives include no abdominal pain, chest pain, congestion, coughing, diaphoresis, fever, headaches, nausea, neck pain, numbness, rash, sore throat or vomiting. Nothing aggravates the symptoms. She has tried nothing for the symptoms. The treatment provided no relief. Hypertension  This is a chronic problem. The current episode started more than 1 month ago. The problem is unchanged. The problem is controlled. Associated symptoms include malaise/fatigue. Pertinent negatives include no chest pain, headaches, neck pain, orthopnea, palpitations, peripheral edema, PND or shortness of breath. There are no associated agents to hypertension. There are no known risk factors for coronary artery disease. Past treatments include beta blockers. The current treatment provides significant improvement. There is no history of CAD/MI, CVA or PVD.  There is no history of a hypertension causing med, pheochromocytoma, renovascular disease, sleep apnea or a thyroid problem.        Patient Active Problem List   Diagnosis    Agoraphobia with panic attacks    Fibromyalgia    Lumbar disc disorder    GERD (gastroesophageal reflux disease)    Rheumatoid arthritis ([address]    MDD (major depressive disorder), recurrent severe, without psychosis ([address]    Abnormality of gait and mobility    Migraine without status migrainosus, not intractable    Essential hypertension    Vitamin D deficiency    Irritable bowel syndrome with constipation    POTS (postural orthostatic tachycardia syndrome)    Malabsorption syndrome    Lumbosacral spondylosis without myelopathy    Mild intermittent asthma without complication       Past Medical History:   Diagnosis Date    Anxiety     Arthritis     Asthma exacerbation with COPD (chronic obstructive pulmonary disease) (HCC)     Colitis     Essential hypertension     Fibromyalgia     GERD (gastroesophageal reflux disease)     Major depression     Malabsorption syndrome     POTS (postural orthostatic tachycardia syndrome)     PTSD (post-traumatic stress disorder)     Rheumatoid arthritis ([address]     Seizures ([address] 2005    due to Ultram     Sx of RLS (restless legs syndrome)        Past Surgical History:   Procedure Laterality Date    ABDOMEN SURGERY  3/5/12    endometreosis    ANESTHESIA NERVE BLOCK Bilateral 6/16/2020    BILATERAL L3 L4 L5 MEDIAL NERVE BRANCH BLOCK   #1 performed by Becky Navarro DO at [address] [place] [address] Bilateral 7/14/2020    BILATERAL L3 4 5 MEDIAL NERVE BRANCH BLOCK # 2 performed by Becky Navarro DO at [address] [place] [address]  11/01/2016    BRONCHOSCOPY  12/14/2016    CHOLECYSTECTOMY  02/25/2016    lap    COLONOSCOPY      ENDOMETRIAL ABLATION      ENDOSCOPY, COLON, DIAGNOSTIC      HYSTERECTOMY      PAIN MANAGEMENT PROCEDURE Left 7/28/2020    LEFT L3 4 5 MEDIAL NERVE BRANCH RADIOFREQUENCY ABLATION performed by Leonard Hutson DO at [facility] [address] Right 8/27/2020    RIGHT L3 4 5 MEDIAL NERVE BRANCH RADIOFREQUENCY ABLATION performed by Leonard Hutson DO at [facility]      related to endometriosis    MARCELINO AND BSO      TONSILLECTOMY         Current Outpatient Medications   Medication Sig Dispense Refill    cimetidine (TAGAMET) 300 MG tablet Take 300 mg by mouth 4 times daily      docusate sodium (COLACE) 100 MG capsule Take 100 mg by mouth 2 times daily      senna (SENOKOT) 8.6 MG TABS tablet Take 1 tablet by mouth daily      propranolol (INDERAL LA) 60 MG extended release capsule Take 1 capsule by mouth daily 90 capsule 0    Acetaminophen (TYLENOL PO) Take by mouth      lidocaine 4 % external patch Place 1 patch onto the skin daily      albuterol sulfate HFA (VENTOLIN HFA) 108 (90 Base) MCG/ACT inhaler Inhale 1 puff into the lungs every 4 hours as needed for Wheezing 1 Inhaler 2    folic acid (FOLVITE) 1 MG tablet Take 1 tablet by mouth daily 30 tablet 5    cetirizine (ZYRTEC) 10 MG tablet Take 1 tablet by mouth daily 30 tablet 5    medical marijuana Take by mouth as needed.  lactated ringers infusion Infuse 1,000 mLs intravenously as needed      EMGALITY 120 MG/ML SOSY every 30 days       VYVANSE 60 MG CAPS Take 60 mg by mouth daily.  guaiFENesin (MUCINEX) 600 MG extended release tablet Take 1 tablet by mouth 2 times daily as needed for Congestion 30 tablet 1    dexlansoprazole (DEXILANT) 60 MG CPDR delayed release capsule Take 1 capsule by mouth daily 30 capsule 1    alendronate (FOSAMAX) 70 MG tablet Take 70 mg by mouth      estradiol (ESTRACE) 0.5 MG tablet Take 0.5 mg by mouth daily      baclofen (LIORESAL) 20 MG tablet Take 20 mg by mouth 3 times daily      PAROXETINE HCL PO Take 15 mg by mouth nightly       LORazepam (ATIVAN) 1 MG tablet Take 1 mg by mouth 2 times daily  .       Probiotic Product (PROBIOTIC DAILY PO) Take 2 capsules by mouth every morning  7-12-20      amylase-lipase-protease, (CREON 6000) 6000 UNITS per capsule Take 2 capsules by mouth 3 times daily (with meals)        No current facility-administered medications for this visit. Allergies   Allergen Reactions    Codeine Hives    Demerol Hives    Morphine Hives     pt states that she has tolerated Dilaudid in the past w/o reaction (5/4/11)    Topiramate      Other reaction(s): Other: See Comments  heart palpitations    Tramadol Other (See Comments)     Other reaction(s):  Other: See Comments  also seizure     Casein Nausea And Vomiting    Eggs Or Egg-Derived Products Nausea And Vomiting    Gluten Meal Nausea And Vomiting    Nsaids      Other reaction(s): GI Upset  H/o ulcers    Peanuts [Peanut Oil] Nausea And Vomiting    Prochlorperazine Edisylate Nausea And Vomiting    Trazodone And Nefazodone Other (See Comments)     Nasal sinus swelling    Whey Nausea And Vomiting       Social History     Socioeconomic History    Marital status:      Spouse name: None    Number of children: None    Years of education: None    Highest education level: None   Occupational History    None   Social Needs    Financial resource strain: None    Food insecurity     Worry: None     Inability: None    Transportation needs     Medical: None     Non-medical: None   Tobacco Use    Smoking status: Current Every Day Smoker     Packs/day: 0.50     Years: 15.00     Pack years: 7.50     Types: Cigarettes     Start date: 10/28/2004    Smokeless tobacco: Never Used    Tobacco comment: smokes about 5 cigarettes a day   Substance and Sexual Activity    Alcohol use: No    Drug use: Yes     Types: Marijuana     Comment: medical     Sexual activity: None   Lifestyle    Physical activity     Days per week: None     Minutes per session: None    Stress: None   Relationships    Social connections     Talks on phone: None     Gets together: None     Attends Ca[employer] service: None     Active member of club or organization: None     Attends meetings of clubs or organizations: None     Relationship status: None    Intimate partner violence     Fear of current or ex partner: None     Emotionally abused: None     Physically abused: None     Forced sexual activity: None   Other Topics Concern    None   Social History Narrative    None       Family History   Problem Relation Age of Onset    High Blood Pressure Father     High Cholesterol Father     High Blood Pressure Mother     No Known Problems Sister           Review of Systems   Constitutional: Positive for fatigue and malaise/fatigue. Negative for activity change, appetite change, diaphoresis and fever. HENT: Negative for congestion, ear pain, hearing loss, nosebleeds, rhinorrhea, sinus pressure and sore throat. Eyes: Negative for pain, redness, itching and visual disturbance. Respiratory: Negative for apnea, cough, chest tightness, shortness of breath and wheezing. Cardiovascular: Negative for chest pain, palpitations, orthopnea, leg swelling and PND. Gastrointestinal: Negative for abdominal pain, blood in stool, constipation, diarrhea, nausea and vomiting. Endocrine: Negative. Genitourinary: Negative for decreased urine volume, difficulty urinating, dysuria, frequency, hematuria and urgency. Musculoskeletal: Positive for arthralgias and myalgias. Negative for back pain, gait problem and neck pain. Skin: Negative for color change and rash. Allergic/Immunologic: Negative for environmental allergies and food allergies. Neurological: Positive for weakness. Negative for dizziness, light-headedness, numbness and headaches. Hematological: Negative for adenopathy. Does not bruise/bleed easily. Psychiatric/Behavioral: Negative for behavioral problems, dysphoric mood and sleep disturbance. The patient is not nervous/anxious and is not hyperactive.     All other systems reviewed and are negative. /76   Pulse 68   Temp 97.2 °F (36.2 °C)   Resp 16   Ht 5' 5\" (1.651 m)   Wt 188 lb (85.3 kg)   SpO2 98%   BMI 31.28 kg/m²     Physical Exam  Vitals signs and nursing note reviewed. Constitutional:       General: She is not in acute distress. Appearance: Normal appearance. She is well-developed. HENT:      Head: Normocephalic and atraumatic. Right Ear: Hearing, tympanic membrane and external ear normal. No tenderness. No middle ear effusion. Left Ear: Hearing, tympanic membrane and external ear normal. No tenderness. No middle ear effusion. Nose: Nose normal. No congestion or rhinorrhea. Right Turbinates: Not enlarged. Left Turbinates: Not enlarged. Mouth/Throat:      Mouth: Mucous membranes are moist.      Tongue: No lesions. Pharynx: Oropharynx is clear. No oropharyngeal exudate or posterior oropharyngeal erythema. Eyes:      General: No scleral icterus. Conjunctiva/sclera: Conjunctivae normal.      Pupils: Pupils are equal, round, and reactive to light. Neck:      Musculoskeletal: Normal range of motion and neck supple. No neck rigidity or muscular tenderness. Thyroid: No thyromegaly. Cardiovascular:      Rate and Rhythm: Normal rate and regular rhythm. Heart sounds: Normal heart sounds. No murmur. Pulmonary:      Effort: Pulmonary effort is normal. No respiratory distress. Breath sounds: Normal breath sounds. No wheezing or rales. Abdominal:      General: Bowel sounds are normal. There is no distension. Palpations: Abdomen is soft. Tenderness: There is no abdominal tenderness. Musculoskeletal: Normal range of motion. General: No tenderness. Lymphadenopathy:      Cervical: No cervical adenopathy. Skin:     General: Skin is warm and dry. Findings: No erythema or rash. Neurological:      General: No focal deficit present.       Mental Status: She is alert and oriented to person, place, and time. Cranial Nerves: No cranial nerve deficit. Deep Tendon Reflexes: Reflexes are normal and symmetric. Reflexes normal.   Psychiatric:         Mood and Affect: Mood normal.                                 ASSESSMENT/PLAN:    Patient Active Problem List   Diagnosis    Agoraphobia with panic attacks    Fibromyalgia    Lumbar disc disorder    GERD (gastroesophageal reflux disease)    [facility]umatoid arthritis ([facility]    MDD (major depressive disorder), recurrent severe, without psychosis ([address]    Abnormality of gait and mobility    Migraine without status migrainosus, not intractable    Essential hypertension    Vitamin D deficiency    Irritable bowel syndrome with constipation    POTS (postural orthostatic tachycardia syndrome)    Malabsorption syndrome    Lumbosacral spondylosis without myelopathy    Mild intermittent asthma without complication       Mandi was seen today for joint pain, referral - general, generalized body aches and fatigue. Diagnoses and all orders for this visit:    Essential hypertension  -     CBC; Future  -     Comprehensive Metabolic Panel; Future  -     Lipid Panel; Future  -     TSH without Reflex; Future    POTS (postural orthostatic tachycardia syndrome)    Mild intermittent asthma without complication    Rheumatoid arthritis of multiple sites with negative rheumatoid factor ([facility]  -     External Referral To Rheumatology    Gastroesophageal reflux disease, unspecified whether esophagitis present    Neuropathy  -     EMG; Future    Other orders  -     INFLUENZA, QUADV, 3 YRS AND OLDER, IM PF, PREFILL SYR OR SDV, 0.5ML (AFLURIA QUADV, PF)          Return in about 6 months (around 6/21/2021) for Follow up HTN, Recheck labs, Recheck Meds. I spent 30 minutes with this patient. I spent greater than 50% of the time counseling this patient.         Anali Michele DO  12/21/2020  10:25 AM

## 2021-01-06 ENCOUNTER — PATIENT MESSAGE (OUTPATIENT)
Dept: PRIMARY CARE CLINIC | Age: 46
End: 2021-01-06

## 2021-01-07 NOTE — TELEPHONE ENCOUNTER
From: Kim Main  To: Teja Whyte DO  Sent: 1/6/2021 3:06 PM EST  Subject: Non-Urgent Medical Question    Dr. Chrissy Panchal,     My disability parking placard expires in March and I wanted to know if you would be able to write the prescription for it. (Dr. Effie Ashby wrote for it 5 years ago but is retired now.)    The prescription must contain:  Date  Name of the person with the disability  Statement that the prescription is for a disability placard  Expected duration of the disabling condition  Healthcare provider's signature    I have an appointment scheduled for 1/19/2021 and can pick it up then, but wanted to ask in advance.     Thank you,   [employer]

## 2021-01-20 DIAGNOSIS — G90.A POTS (POSTURAL ORTHOSTATIC TACHYCARDIA SYNDROME): ICD-10-CM

## 2021-01-21 RX ORDER — PROPRANOLOL HCL 60 MG
60 CAPSULE, EXTENDED RELEASE 24HR ORAL DAILY
Qty: 90 CAPSULE | Refills: 0 | Status: SHIPPED
Start: 2021-01-21 | End: 2021-04-29 | Stop reason: SDUPTHER

## 2021-02-01 RX ORDER — MONTELUKAST SODIUM 10 MG/1
10 TABLET ORAL DAILY
Qty: 30 TABLET | Refills: 3 | Status: SHIPPED
Start: 2021-02-01 | End: 2021-07-12 | Stop reason: SDUPTHER

## 2021-03-04 RX ORDER — CETIRIZINE HYDROCHLORIDE 10 MG/1
10 TABLET ORAL DAILY
Qty: 30 TABLET | Refills: 5 | Status: SHIPPED
Start: 2021-03-04 | End: 2021-11-02 | Stop reason: SDUPTHER

## 2021-04-20 ENCOUNTER — TELEPHONE (OUTPATIENT)
Dept: NON INVASIVE DIAGNOSTICS | Age: 46
End: 2021-04-20

## 2021-04-27 NOTE — PROGRESS NOTES
BRONCHOSCOPY  12/14/2016    CHOLECYSTECTOMY  02/25/2016    lap    COLONOSCOPY      ENDOMETRIAL ABLATION      ENDOSCOPY, COLON, DIAGNOSTIC      HYSTERECTOMY      PAIN MANAGEMENT PROCEDURE Left 7/28/2020    LEFT L3 4 5 MEDIAL NERVE BRANCH RADIOFREQUENCY ABLATION performed by Kelley Aviles DO at [address] [facility] Right 8/27/2020    RIGHT L3 4 5 MEDIAL NERVE BRANCH RADIOFREQUENCY ABLATION performed by Kelley Aviles DO at [facility]      related to endometriosis    MARCELINO AND BSO      TONSILLECTOMY         Prior to Admission medications    Medication Sig Start Date End Date Taking? Authorizing Provider   cetirizine (ZYRTEC) 10 MG tablet TAKE 1 TABLET BY MOUTH DAILY 3/4/21  Yes Jignesh Marcano DO   montelukast (SINGULAIR) 10 MG tablet TAKE 1 TABLET BY MOUTH DAILY 2/1/21  Yes John Ford DO   propranolol (INDERAL LA) 60 MG extended release capsule TAKE 1 CAPSULE BY MOUTH DAILY 1/21/21  Yes John Ford DO   Handicap Placard MISC by Does not apply route Patient cannot walk 200 ft without stopping to rest.    Expiration 5 YRS 1/7/21  Yes John Ford, DO   cimetidine (TAGAMET) 300 MG tablet Take 300 mg by mouth 4 times daily   Yes Historical Provider, MD   Acetaminophen (TYLENOL PO) Take by mouth   Yes Historical Provider, MD   lidocaine 4 % external patch Place 1 patch onto the skin daily   Yes Historical Provider, MD   albuterol sulfate HFA (VENTOLIN HFA) 108 (90 Base) MCG/ACT inhaler Inhale 1 puff into the lungs every 4 hours as needed for Wheezing 7/22/20  Yes Jignesh Marcano,    folic acid (FOLVITE) 1 MG tablet Take 1 tablet by mouth daily 7/22/20  Yes John Ford,    medical marijuana Take by mouth as needed. Yes Historical Provider, MD   EMGALITY 120 MG/ML SOSY every 30 days  10/1/19  Yes Historical Provider, MD   VYVANSE 60 MG CAPS Take 60 mg by mouth daily.   8/3/19  Yes Historical Provider, MD   guaiFENesin (MUCINEX) 600 MG extended release tablet Take 1 tablet by mouth 2 times daily as needed for Congestion 7/2/19  Yes JESSE Almendarez - CNP   dexlansoprazole (DEXILANT) 60 MG CPDR delayed release capsule Take 1 capsule by mouth daily 12/17/18  Yes Nestora Do, DO   alendronate (FOSAMAX) 70 MG tablet Take 70 mg by mouth   Yes Historical Provider, MD   estradiol (ESTRACE) 0.5 MG tablet Take 0.5 mg by mouth daily   Yes Historical Provider, MD   baclofen (LIORESAL) 20 MG tablet Take 20 mg by mouth 3 times daily   Yes Historical Provider, MD   PAROXETINE HCL PO Take 15 mg by mouth nightly    Yes Historical Provider, MD   LORazepam (ATIVAN) 1 MG tablet Take 1 mg by mouth 2 times daily  . 8/14/16  Yes Historical Provider, MD   Probiotic Product (PROBIOTIC DAILY PO) Take 2 capsules by mouth every morning LD 7-12-20   Yes Historical Provider, MD   docusate sodium (COLACE) 100 MG capsule Take 100 mg by mouth 2 times daily    Historical Provider, MD   senna (SENOKOT) 8.6 MG TABS tablet Take 1 tablet by mouth daily    Historical Provider, MD   lactated ringers infusion Infuse 1,000 mLs intravenously as needed    Historical Provider, MD   amylase-lipase-protease, (CREON 6000) 6000 UNITS per capsule Take 2 capsules by mouth 3 times daily (with meals)     Historical Provider, MD       Allergies   Allergen Reactions    Codeine Hives    Demerol Hives    Morphine Hives     pt states that she has tolerated Dilaudid in the past w/o reaction (5/4/11)    Topiramate      Other reaction(s): Other: See Comments  heart palpitations    Tramadol Other (See Comments)     Other reaction(s):  Other: See Comments  also seizure     Casein Nausea And Vomiting    Eggs Or Egg-Derived Products Nausea And Vomiting    Gluten Meal Nausea And Vomiting    Nsaids      Other reaction(s): GI Upset  H/o ulcers    Peanuts [Peanut Oil] Nausea And Vomiting    Prochlorperazine Edisylate Nausea And Vomiting    Trazodone And Nefazodone Other (See Comments)     Nasal sinus EXAMINATION:      /68   Pulse 108   Temp 97.4 °F (36.3 °C) (Temporal)   Resp 16   Ht 5' 5\" (1.651 m)   Wt 188 lb (85.3 kg)   BMI 31.28 kg/m²     General:      General appearance:pleasant and well-hydrated, in no distress and A & O x3  Build:Overweight  Function:Rises from a seated position with difficulty    HEENT:    Head:normocephalic, atraumatic  Pupils:regular, round, equal  Sclera: icterus absent    Lungs:    Breathing:normal breathing pattern    Abdomen:    Shape:non-distended  Tenderness:none  Guarding:none    Lumbar spine:    Spine inspection:normal   CVA tenderness:No   Palpation:tenderness paravertebral muscles, left, right positive. + pain with facet loading  Range of motion:abnormal mildly in lateral bending, flexion, extension rotation bilateral and is painful. Musculoskeletal:    Trigger points in Paraveteral:absent bilaterally  SI joint tenderness:positive right, positive left              MARGY test:positive right, positive left  Piriformis tenderness:negative right, negative left  Trochanteric bursa tenderness:negative right, negative left  SLR:negative right, negative left, sitting     Extremities:    Tremors:None bilaterally upper and lower  Range of motion:pain with internal rotation of hips negative.   Intact:Yes  Varicose veins:absent   Pulses:present Lt radial  Cyanosis:none  Edema:none x all 4 extremities    Neurological:    Sensory:normal to light touch BLE  Motor:                Right Quadriceps5/5          Left Quadriceps5/5           Right Gastrocnemius5/5    Left Gastrocnemius5/5  Right Ant Tibialis5/5  Left Ant Tibialis5/5    Reflexes:    Right Quadriceps reflex2+  Left Quadriceps reflex2+  Right Achilles reflex2+  Left Achilles reflex2+  Gait:normal station, with a quad cane    Dermatology:    Skin:no rashes or lesions noted     Impression:     LBP  Lumbar MRI shows DDD without sig central or foraminal stenosis  Referred by NSGY for procedures  Extensive PMHx - RLS, chronic migraines, seizures, rheumatoid arthritis, POTS, GERD, fibromyalgia, HTN, CHANNING, colitis, and a neuromuscular disorder  Hx SCS TRIAL for endometriosis which was complicated by a MRSA infection tx with PICC line abx  Has tried baclofen, lidocaine patches, and medical THC with moderate relief      Plan:  Urine screen deferred  OARRS report reviewed  Left then right L3,4,5 RFA with 80% relief (valium for presedation, needs )  Consider b/l SIJ injection prn  PT - continue, neg leg length discrep. Patient encouraged to stay active and to lose weight  Treatment plan discussed with the patient including medications and procedure side effects     We discussed with the patient that combining opioids, benzodiazepines, alcohol, illicit drugs or sleep aids increases the risk of respiratory depression including death. We discussed that these medications may cause drowsiness, sedation or dizziness and have counseled the patient not to drive or operate machinery. We have discussed that these medications will be prescribed only by one provider. We have discussed with the patient about age related risk factors and have thoroughly discussed the importance of taking these medications as prescribed. The patient verbalizes understanding. Cc:  Referring physician    KIM Peres.

## 2021-04-28 ENCOUNTER — PREP FOR PROCEDURE (OUTPATIENT)
Dept: PAIN MANAGEMENT | Age: 46
End: 2021-04-28

## 2021-04-28 ENCOUNTER — OFFICE VISIT (OUTPATIENT)
Dept: PAIN MANAGEMENT | Age: 46
End: 2021-04-28
Payer: MEDICAID

## 2021-04-28 VITALS
HEART RATE: 108 BPM | TEMPERATURE: 97.4 F | BODY MASS INDEX: 31.32 KG/M2 | RESPIRATION RATE: 16 BRPM | WEIGHT: 188 LBS | HEIGHT: 65 IN | DIASTOLIC BLOOD PRESSURE: 68 MMHG | SYSTOLIC BLOOD PRESSURE: 130 MMHG

## 2021-04-28 DIAGNOSIS — M47.817 LUMBOSACRAL SPONDYLOSIS WITHOUT MYELOPATHY: Primary | ICD-10-CM

## 2021-04-28 DIAGNOSIS — G89.29 CHRONIC BILATERAL LOW BACK PAIN WITHOUT SCIATICA: ICD-10-CM

## 2021-04-28 DIAGNOSIS — M54.50 CHRONIC BILATERAL LOW BACK PAIN WITHOUT SCIATICA: ICD-10-CM

## 2021-04-28 DIAGNOSIS — G89.4 CHRONIC PAIN SYNDROME: Primary | ICD-10-CM

## 2021-04-28 DIAGNOSIS — M47.817 LUMBOSACRAL SPONDYLOSIS WITHOUT MYELOPATHY: ICD-10-CM

## 2021-04-28 PROCEDURE — 99213 OFFICE O/P EST LOW 20 MIN: CPT | Performed by: PAIN MEDICINE

## 2021-04-28 NOTE — PROGRESS NOTES
Do you currently have any of the following:    Fever: No  Headache:  No  Cough: No  Shortness of breath: No  Exposed to anyone with these symptoms: No         Mandi Merino presents to the [facility] on 4/28/[facility]21. Mandi is complaining of pain lower back  The pain is constant. The pain is described as aching, throbbing, shooting and stabbing. Pain is rated on her best day at a 3, on her worst day at a 9, and on average at a 7 on the VAS scale. She took her last dose of     Any procedures since your last visit:     Pacemaker or defibrillator: No managed by. She is not on NSAIDS and is not on anticoagulation medications to include none and is managed by      Medication Contract and Consent for Opioid Use Documents Filed      No documents found                /68   Pulse 108   Temp 97.4 °F (36.3 °C) (Temporal)   Resp 16   Ht 5' 5\" (1.651 m)   Wt 188 lb (85.3 kg)   BMI 31.28 kg/m²      No LMP recorded. Patient has had a hysterectomy.

## 2021-04-29 ENCOUNTER — TELEPHONE (OUTPATIENT)
Dept: PAIN MANAGEMENT | Age: 46
End: 2021-04-29

## 2021-04-29 ENCOUNTER — PATIENT MESSAGE (OUTPATIENT)
Dept: PRIMARY CARE CLINIC | Age: 46
End: 2021-04-29

## 2021-04-29 DIAGNOSIS — G90.A POTS (POSTURAL ORTHOSTATIC TACHYCARDIA SYNDROME): ICD-10-CM

## 2021-04-29 RX ORDER — PROPRANOLOL HCL 60 MG
60 CAPSULE, EXTENDED RELEASE 24HR ORAL DAILY
Qty: 90 CAPSULE | Refills: 0 | Status: SHIPPED
Start: 2021-04-29 | End: 2021-04-29

## 2021-04-29 RX ORDER — PROPRANOLOL HCL 60 MG
60 CAPSULE, EXTENDED RELEASE 24HR ORAL DAILY
Qty: 90 CAPSULE | Refills: 0 | Status: SHIPPED
Start: 2021-04-29 | End: 2021-05-13 | Stop reason: ALTCHOICE

## 2021-04-29 NOTE — TELEPHONE ENCOUNTER
Pt called, wanted to know if you were going to send a prescription for Voltaren or if you wanted her to purchase it OTC. Please advise.

## 2021-04-29 NOTE — TELEPHONE ENCOUNTER
From: Pattie Homans  To: Manisha Eduardo DO  Sent: 4/29/2021 10:10 AM EDT  Subject: Prescription Question    Dr Zhanna Wren,  Would you be able to refill my prescription for Propranolol ER 60mg? My pharmacy is the [facility] [address] in [place].   Thank you,  [facility]

## 2021-05-12 ENCOUNTER — OFFICE VISIT (OUTPATIENT)
Dept: NON INVASIVE DIAGNOSTICS | Age: 46
End: 2021-05-12
Payer: MEDICAID

## 2021-05-12 VITALS
HEART RATE: 76 BPM | DIASTOLIC BLOOD PRESSURE: 72 MMHG | SYSTOLIC BLOOD PRESSURE: 132 MMHG | WEIGHT: 180 LBS | RESPIRATION RATE: 18 BRPM | HEIGHT: 65 IN | BODY MASS INDEX: 29.99 KG/M2

## 2021-05-12 DIAGNOSIS — G90.A POTS (POSTURAL ORTHOSTATIC TACHYCARDIA SYNDROME): ICD-10-CM

## 2021-05-12 DIAGNOSIS — R00.2 PALPITATIONS: Primary | ICD-10-CM

## 2021-05-12 DIAGNOSIS — I42.2 HYPERTROPHIC CARDIOMYOPATHY (HCC): ICD-10-CM

## 2021-05-12 PROCEDURE — 93000 ELECTROCARDIOGRAM COMPLETE: CPT | Performed by: INTERNAL MEDICINE

## 2021-05-12 PROCEDURE — 99204 OFFICE O/P NEW MOD 45 MIN: CPT | Performed by: INTERNAL MEDICINE

## 2021-05-12 RX ORDER — PROPRANOLOL HYDROCHLORIDE 20 MG/1
20 TABLET ORAL 3 TIMES DAILY
Qty: 90 TABLET | Refills: 3 | Status: SHIPPED | OUTPATIENT
Start: 2021-05-12

## 2021-05-12 RX ORDER — SODIUM CHLORIDE
1 POWDER (GRAM) MISCELLANEOUS
COMMUNITY

## 2021-05-12 ASSESSMENT — ENCOUNTER SYMPTOMS
WHEEZING: 0
NAUSEA: 0
DIARRHEA: 0
CHEST TIGHTNESS: 0
ABDOMINAL DISTENTION: 0
ABDOMINAL PAIN: 0
COLOR CHANGE: 0
EYE REDNESS: 0
SINUS PRESSURE: 0
SHORTNESS OF BREATH: 0
COUGH: 0

## 2021-05-12 NOTE — PROGRESS NOTES
Cardiac Electrophysiology Outpatient Progress Note    Cathryn Luis  1975  Date of Service: 2021  Referring Provider/PCP: Harper Ball DO  Chief Complaint:   Chief Complaint   Patient presents with    New Patient     DX: POTS - dx thru tilt table test in 2017 - discuss medications to short acting instead of long actions     Fatigue    Dizziness    Cardiomyopathy     needs referral for cardiomyopathy - mom has, and aunt recently  from cardiac arrest sudden         HISTORY  [address] presents to the office today for the management of these Electrophysiology conditions: POTS and family history of HOCM    She has family history of Hypertrophic Cardiomyopathy (Mother), NO ICD and Hypertension (Father)   She has a history of RLS, chronic migraines, seizures, rheumatoid arthritis, POTS, GERD, fibromyalgia, HTN, CHANNING, colitis, and a neuromuscular disorder. She does see Pain management for injections spinal.     In 10/2017 - saw Dr. Leni Andrea with cardiology for dizziness while on Paxil, Adderall and Lyrica. She was diagnosed with POTS. Tilt table was done 2017 and it was positive. 2021: Ms. Quan Luu presents today and reports she is extremely lightheaded and needed to lay supine during this visit. Her episodes are now more frequent. She reports she is very aggressive with her hydration and drinks 3-4 liters of water with a add electrolyte powder. She presents today in SR, with a rate of 76bpm. She reports trying Florinef and Midodrine in the past but had side effects. She is reluctant to use a abdominal binder due to have fibromyalgia. The patient denies any chest pain, dyspnea, palpitations, syncope, orthopnea or paroxysmal nocturnal dyspnea. She reports her maternal aunt passed away suddenly from unknown cardiac issues, in 2021. She is unsure of the exact details surrounding her passing. She does report smoking cigarettes periodically.  She denies any ETOH usage and had eliminated her caffeine intake. She is currently  and has not children. Patient Active Problem List    Diagnosis Date Noted    GERD (gastroesophageal reflux disease) 10/02/2016     Priority: Medium    Rheumatoid arthritis ([facility] 10/02/2016     Priority: Low    Fibromyalgia 09/28/2016     Priority: Low    Mild intermittent asthma without complication 37/51/7095    Lumbosacral spondylosis without myelopathy 05/27/2020    Malabsorption syndrome     POTS (postural orthostatic tachycardia syndrome) 11/09/2017    Irritable bowel syndrome with constipation 05/09/2017     Overview Note:     Remigio Salamanca started on amitiza 8 mcg daily       Vitamin D deficiency 04/27/2017    Migraine without status migrainosus, not intractable 02/22/2017    Essential hypertension 02/22/2017    Abnormality of gait and mobility 11/18/2016    MDD (major depressive disorder), recurrent severe, without psychosis ([facility]     Lumbar disc disorder 09/28/2016    Agoraphobia with panic attacks 08/27/2016       Family History   Problem Relation Age of Onset    High Blood Pressure Father     High Cholesterol Father     High Blood Pressure Mother     No Known Problems Sister        SOCIAL HISTORY   Social History     Socioeconomic History    Marital status:      Spouse name: Not on file    Number of children: Not on file    Years of education: Not on file    Highest education level: Not on file   Occupational History    Not on file   Social Needs    Financial resource strain: Not on file    Food insecurity     Worry: Not on file     Inability: Not on file    Transportation needs     Medical: Not on file     Non-medical: Not on file   Tobacco Use    Smoking status: Current Every Day Smoker     Packs/day: 0.50     Years: 15.00     Pack years: 7.50     Types: Cigarettes     Start date: 10/28/2004    Smokeless tobacco: Never Used    Tobacco comment: smokes 5-10 cigs on stressful days then can go week without   Substance and Sexual Activity    Alcohol use: No    Drug use: Yes     Types: Marijuana     Comment: medical - transdermal patch - edibles    Sexual activity: Not on file   Lifestyle    Physical activity     Days per week: Not on file     Minutes per session: Not on file    Stress: Not on file   Relationships    Social connections     Talks on phone: Not on file     Gets together: Not on file     Attends [employer] service: Not on file     Active member of club or organization: Not on file     Attends meetings of clubs or organizations: Not on file     Relationship status: Not on file    Intimate partner violence     Fear of current or ex partner: Not on file     Emotionally abused: Not on file     Physically abused: Not on file     Forced sexual activity: Not on file   Other Topics Concern    Not on file   Social History Narrative    Not on file         Past Surgical History:   Procedure Laterality Date    ABDOMEN SURGERY  3/5/12    endometreosis    ANESTHESIA NERVE BLOCK Bilateral 6/16/2020    BILATERAL L3 L4 L5 MEDIAL NERVE BRANCH BLOCK   #1 performed by Fredi Russo DO at [address] [facility] Bilateral 7/14/2020    BILATERAL L3 4 5 MEDIAL NERVE BRANCH BLOCK # 2 performed by Fredi Russo DO at [address] [place] [address]  11/01/2016    BRONCHOSCOPY  12/14/2016    CHOLECYSTECTOMY  02/25/2016    lap    COLONOSCOPY      ENDOMETRIAL ABLATION      ENDOSCOPY, COLON, DIAGNOSTIC      HYSTERECTOMY      PAIN MANAGEMENT PROCEDURE Left 7/28/2020    LEFT L3 4 5 MEDIAL NERVE BRANCH RADIOFREQUENCY ABLATION performed by Fredi Russo DO at [facility] Right 8/27/2020    RIGHT L3 4 5 MEDIAL NERVE BRANCH RADIOFREQUENCY ABLATION performed by Fredi Russo DO at [address] [place] [address]      related to endometriosis    MARCELINO AND BSO      TONSILLECTOMY         Current Outpatient Medications Medication Sig Dispense Refill    sodium chloride POWD powder Take 1 g by mouth 3 times daily (with meals)      Elastic Bandages & Supports (ABDOMINAL BINDER/ELASTIC LARGE) MISC 1 Device by Does not apply route daily 1 each 0    propranolol (INDERAL) 20 MG tablet Take 1 tablet by mouth 3 times daily 90 tablet 3    propranolol (INDERAL LA) 60 MG extended release capsule TAKE 1 CAPSULE BY MOUTH DAILY 90 capsule 0    cetirizine (ZYRTEC) 10 MG tablet TAKE 1 TABLET BY MOUTH DAILY 30 tablet 5    montelukast (SINGULAIR) 10 MG tablet TAKE 1 TABLET BY MOUTH DAILY 30 tablet 3    Handicap Placard MISC by Does not apply route Patient cannot walk 200 ft without stopping to rest.    Expiration 5 YRS 1 each 0    cimetidine (TAGAMET) 300 MG tablet Take 300 mg by mouth 2 times daily       Acetaminophen (TYLENOL PO) Take by mouth      lidocaine 4 % external patch Place 1 patch onto the skin daily      albuterol sulfate HFA (VENTOLIN HFA) 108 (90 Base) MCG/ACT inhaler Inhale 1 puff into the lungs every 4 hours as needed for Wheezing 1 Inhaler 2    folic acid (FOLVITE) 1 MG tablet Take 1 tablet by mouth daily 30 tablet 5    medical marijuana Take by mouth as needed.  lactated ringers infusion Infuse 1,000 mLs intravenously as needed      EMGALITY 120 MG/ML SOSY every 30 days       VYVANSE 60 MG CAPS Take 60 mg by mouth daily.  guaiFENesin (MUCINEX) 600 MG extended release tablet Take 1 tablet by mouth 2 times daily as needed for Congestion 30 tablet 1    dexlansoprazole (DEXILANT) 60 MG CPDR delayed release capsule Take 1 capsule by mouth daily 30 capsule 1    estradiol (ESTRACE) 0.5 MG tablet Take 0.5 mg by mouth daily      baclofen (LIORESAL) 20 MG tablet Take 20 mg by mouth 3 times daily      PAROXETINE HCL PO Take 20 mg by mouth nightly       LORazepam (ATIVAN) 1 MG tablet Take 1 mg by mouth 2 times daily  .       Probiotic Product (PROBIOTIC DAILY PO) Take 2 capsules by mouth every morning LD 7-12-20      alendronate (FOSAMAX) 70 MG tablet Take 70 mg by mouth       No current facility-administered medications for this visit. Allergies   Allergen Reactions    Codeine Hives    Demerol Hives    Morphine Hives     pt states that she has tolerated Dilaudid in the past w/o reaction (5/4/11)    Topiramate      Other reaction(s): Other: See Comments  heart palpitations    Tramadol Other (See Comments)     Other reaction(s): Other: See Comments  also seizure     Casein Nausea And Vomiting    Eggs Or Egg-Derived Products Nausea And Vomiting    Gluten Meal Nausea And Vomiting    Nsaids      Other reaction(s): GI Upset  H/o ulcers    Peanuts [Peanut Oil] Nausea And Vomiting    Prochlorperazine Edisylate Nausea And Vomiting    Trazodone And Nefazodone Other (See Comments)     Nasal sinus swelling    Whey Nausea And Vomiting           ROS:   Review of Systems   Constitutional: Negative for fatigue and fever. HENT: Negative for congestion, nosebleeds and sinus pressure. Eyes: Negative for redness and visual disturbance. Respiratory: Negative for cough, chest tightness, shortness of breath and wheezing. Cardiovascular: Negative for chest pain, palpitations and leg swelling. Gastrointestinal: Negative for abdominal distention, abdominal pain, diarrhea and nausea. Endocrine: Negative for cold intolerance, heat intolerance, polydipsia and polyphagia. Genitourinary: Negative for difficulty urinating, frequency and urgency. Musculoskeletal: Negative for arthralgias, joint swelling and myalgias. Skin: Negative for color change and wound. Neurological: Positive for dizziness. Negative for syncope, weakness and numbness. Psychiatric/Behavioral: Negative for agitation, behavioral problems, confusion, decreased concentration, hallucinations and suicidal ideas. The patient is not nervous/anxious.             PHYSICAL EXAM:  Vitals:    05/12/21 1310   BP: 132/72   Pulse: 76   Resp: 18 Weight: 180 lb (81.6 kg)   Height: 5' 5\" (1.651 m)     Physical Exam  Vitals signs reviewed. Constitutional:       Appearance: Normal appearance. HENT:      Head: Normocephalic. Mouth/Throat:      Mouth: Mucous membranes are moist.      Pharynx: Oropharynx is clear. Eyes:      Conjunctiva/sclera: Conjunctivae normal.   Neck:      Musculoskeletal: Normal range of motion and neck supple. Vascular: No carotid bruit. Cardiovascular:      Rate and Rhythm: Normal rate and regular rhythm. Pulses: Normal pulses. Heart sounds: Normal heart sounds. Pulmonary:      Effort: Pulmonary effort is normal.      Breath sounds: Normal breath sounds. No rales. Chest:      Chest wall: No tenderness. Abdominal:      General: Bowel sounds are normal.      Palpations: Abdomen is soft. Musculoskeletal: Normal range of motion. Skin:     General: Skin is warm. Neurological:      General: No focal deficit present. Mental Status: She is alert and oriented to person, place, and time. Psychiatric:         Mood and Affect: Mood normal.         Behavior: Behavior normal.         Thought Content:  Thought content normal.          Pertinent Labs:  CBC:   WBC (E9/L)   Date Value   12/21/2020 6.5   12/18/2018 5.9   10/25/2018 6.1     Hemoglobin (g/dL)   Date Value   12/21/2020 15.2   12/18/2018 15.9 (H)   10/25/2018 15.2     Hematocrit (%)   Date Value   12/21/2020 47.3   12/18/2018 46.9   10/25/2018 46.5     Platelets (Y9/P)   Date Value   12/21/2020 355   12/18/2018 333   10/25/2018 335      BMP:   Sodium (mmol/L)   Date Value   12/21/2020 144   12/18/2018 138   10/25/2018 142     Potassium (mmol/L)   Date Value   12/21/2020 3.6   12/18/2018 3.9   10/25/2018 3.6     Magnesium (mg/dL)   Date Value   10/25/2018 2.2   03/26/2017 2.5   02/13/2017 2.2     Chloride (mmol/L)   Date Value   12/21/2020 104   12/18/2018 105   10/25/2018 110 (H)     CO2 (mmol/L)   Date Value   12/21/2020 20 (L)   12/18/2018 20 (L) 10/25/2018 20 (L)     BUN (mg/dL)   Date Value   12/21/2020 9   12/18/2018 11   10/25/2018 10     CREATININE (mg/dL)   Date Value   12/21/2020 0.9   12/18/2018 0.7   10/25/2018 0.8     Glucose (mg/dL)   Date Value   12/21/2020 89   12/18/2018 90   10/25/2018 91   04/24/2012 99   04/20/2012 89   04/10/2012 86     Calcium (mg/dL)   Date Value   12/21/2020 10.1   12/18/2018 8.5 (L)   10/25/2018 9.2      INR:   INR (no units)   Date Value   12/18/2018 0.8   03/02/2018 1.0   02/09/2017 1.0      BNP: No results found for: BNP   TSH:   TSH (uIU/mL)   Date Value   12/21/2020 1.490   10/25/2018 1.380   09/07/2017 3.160      Cardiac Injury Profile: Total CK (U/L)   Date Value   02/09/2017 156     CK-MB (ng/mL)   Date Value   02/10/2011 1.4     Troponin (ng/mL)   Date Value   09/04/2017 <0.01     Lipid Profile:   Triglycerides (mg/dL)   Date Value   12/21/2020 170 (H)     HDL (mg/dL)   Date Value   12/21/2020 62     LDL Calculated (mg/dL)   Date Value   12/21/2020 134 (H)     Cholesterol, Total (mg/dL)   Date Value   12/21/2020 230 (H)      Hemoglobin A1C:   Hemoglobin A1C (%)   Date Value   10/25/2018 5.4           Pertinent Cardiac Testing:   10/2016 TTE   Summary   Left ventricular size is grossly normal.   Normal LV segmental wall motion. Ejection fraction is visually estimated at 60%. Mild left ventricular concentric hypertrophy noted. Mild tricuspid regurgitation. RVSP is 27 mmHg. ECG 5/12/2021: normal sinus rhythm, 76 bpm - see scanned cardiology    I have independently reviewed all of the ECGs and rhythm strips per above    I have personally reviewed the laboratory, cardiac diagnostic and radiographic testing as outlined above: We have requested previous records. 1. Palpitations    2. Hypertrophic cardiomyopathy ([address]    3.  POTS (postural orthostatic tachycardia syndrome)         ASSESSMENT & PLAN    1. POTS  - Diagnosed in 2017 with positive TILT table  - Intolerant of Midodrine and Florinef  -

## 2021-05-13 ENCOUNTER — TELEPHONE (OUTPATIENT)
Dept: PAIN MANAGEMENT | Age: 46
End: 2021-05-13

## 2021-05-13 DIAGNOSIS — M54.50 CHRONIC BILATERAL LOW BACK PAIN WITHOUT SCIATICA: ICD-10-CM

## 2021-05-13 DIAGNOSIS — G89.29 CHRONIC BILATERAL LOW BACK PAIN WITHOUT SCIATICA: ICD-10-CM

## 2021-05-13 DIAGNOSIS — M47.817 LUMBOSACRAL SPONDYLOSIS WITHOUT MYELOPATHY: ICD-10-CM

## 2021-05-13 DIAGNOSIS — G89.4 CHRONIC PAIN SYNDROME: ICD-10-CM

## 2021-05-13 RX ORDER — DIAZEPAM 10 MG/1
TABLET ORAL
Qty: 1 TABLET | Refills: 0 | Status: SHIPPED
Start: 2021-05-13 | End: 2021-06-09 | Stop reason: SDUPTHER

## 2021-05-13 NOTE — PROGRESS NOTES
[facility] MANAG[facility]  INSTRUCTIONS  . .......................................................................................................................................... [] Parking the day of Surgery is located in the [facility].   Upon entering the door, someone will be there to greet you    []  Bring photo ID and insurance card     [] You may have a light breakfast day of procedure    []  Wear loose comfortable clothing    []  Please follow instructions for medications as given per Dr's office     [] Stop blood thinners as per Dr's office instructions    [] You can expect a call the business day prior to procedure to notify you of your arrival time     [] Please arrange for     []  Other instructions

## 2021-05-13 NOTE — TELEPHONE ENCOUNTER
Pt is calling for one tablet of Valium 10mg to take one hour prior to her procedure she is having done next Tuesday 5/18/21- Radiofrequency ablation. Procedure being done at [facility] in [facility]. Pt has PTSD and panic attacks and  has prescribed this in the past for her.     [facility] [address]

## 2021-05-13 NOTE — PROGRESS NOTES
Have you been tested for COVID  Yes           Have you been told you were positive for COVID Yes  Have you had any known exposure to someone that is positive for COVID No  Do you have a cough                   No              Do you have shortness of breath No                 Do you have a sore throat            No                Are you having chills                    No                Are you having muscle aches. No                    Please come to the hospital wearing a mask and have your significant other wear a mask as well. Both of you should check your temperature before leaving to come here,  if it is 100 or higher please call [phone] for instruction.

## 2021-05-18 ENCOUNTER — HOSPITAL ENCOUNTER (OUTPATIENT)
Age: 46
Setting detail: OUTPATIENT SURGERY
Discharge: HOME | End: 2021-05-18
Attending: PAIN MEDICINE | Admitting: PAIN MEDICINE
Payer: MEDICAID

## 2021-05-18 ENCOUNTER — HOSPITAL ENCOUNTER (OUTPATIENT)
Dept: GENERAL RADIOLOGY | Age: 46
Discharge: HOME | End: 2021-05-20
Attending: PAIN MEDICINE
Payer: MEDICAID

## 2021-05-18 VITALS
HEIGHT: 65 IN | RESPIRATION RATE: 16 BRPM | SYSTOLIC BLOOD PRESSURE: 129 MMHG | TEMPERATURE: 97.7 F | WEIGHT: 180 LBS | OXYGEN SATURATION: 99 % | HEART RATE: 80 BPM | BODY MASS INDEX: 29.99 KG/M2 | DIASTOLIC BLOOD PRESSURE: 92 MMHG

## 2021-05-18 DIAGNOSIS — R52 PAIN: ICD-10-CM

## 2021-05-18 PROCEDURE — 3600000002 HC SURGERY LEVEL 2 BASE: Performed by: PAIN MEDICINE

## 2021-05-18 PROCEDURE — 7100000010 HC PHASE II RECOVERY - FIRST 15 MIN: Performed by: PAIN MEDICINE

## 2021-05-18 PROCEDURE — 64636 DESTROY L/S FACET JNT ADDL: CPT | Performed by: PAIN MEDICINE

## 2021-05-18 PROCEDURE — 2500000003 HC RX 250 WO HCPCS: Performed by: PAIN MEDICINE

## 2021-05-18 PROCEDURE — 2709999900 HC NON-CHARGEABLE SUPPLY: Performed by: PAIN MEDICINE

## 2021-05-18 PROCEDURE — 3600000012 HC SURGERY LEVEL 2 ADDTL 15MIN: Performed by: PAIN MEDICINE

## 2021-05-18 PROCEDURE — 7100000011 HC PHASE II RECOVERY - ADDTL 15 MIN: Performed by: PAIN MEDICINE

## 2021-05-18 PROCEDURE — 6360000002 HC RX W HCPCS: Performed by: PAIN MEDICINE

## 2021-05-18 PROCEDURE — 3209999900 FLUORO FOR SURGICAL PROCEDURES

## 2021-05-18 PROCEDURE — 64635 DESTROY LUMB/SAC FACET JNT: CPT | Performed by: PAIN MEDICINE

## 2021-05-18 RX ORDER — METHYLPREDNISOLONE ACETATE 40 MG/ML
INJECTION, SUSPENSION INTRA-ARTICULAR; INTRALESIONAL; INTRAMUSCULAR; SOFT TISSUE PRN
Status: DISCONTINUED | OUTPATIENT
Start: 2021-05-18 | End: 2021-05-18 | Stop reason: ALTCHOICE

## 2021-05-18 RX ORDER — BUPIVACAINE HYDROCHLORIDE 2.5 MG/ML
INJECTION, SOLUTION EPIDURAL; INFILTRATION; INTRACAUDAL PRN
Status: DISCONTINUED | OUTPATIENT
Start: 2021-05-18 | End: 2021-05-18 | Stop reason: ALTCHOICE

## 2021-05-18 RX ORDER — LIDOCAINE HYDROCHLORIDE 20 MG/ML
INJECTION, SOLUTION EPIDURAL; INFILTRATION; INTRACAUDAL; PERINEURAL PRN
Status: DISCONTINUED | OUTPATIENT
Start: 2021-05-18 | End: 2021-05-18 | Stop reason: ALTCHOICE

## 2021-05-18 ASSESSMENT — PAIN - FUNCTIONAL ASSESSMENT: PAIN_FUNCTIONAL_ASSESSMENT: 0-10

## 2021-05-18 ASSESSMENT — PAIN DESCRIPTION - DESCRIPTORS: DESCRIPTORS: ACHING;DISCOMFORT

## 2021-05-18 NOTE — H&P
(VALIUM) 10 MG tablet Take one tab one hour prior to the procedure 5/13/21 6/12/21 Yes JUSTIN Grayson   sodium chloride POWD powder Take 1 g by mouth 3 times daily (with meals)   Yes Historical Provider, MD   propranolol (INDERAL) 20 MG tablet Take 1 tablet by mouth 3 times daily 5/12/21  Yes Forrest Medeiros MD   cetirizine (ZYRTEC) 10 MG tablet TAKE 1 TABLET BY MOUTH DAILY 3/4/21  Yes Jignesh Marcano DO   montelukast (SINGULAIR) 10 MG tablet TAKE 1 TABLET BY MOUTH DAILY 2/1/21  Yes Leo Leija DO   cimetidine (TAGAMET) 300 MG tablet Take 300 mg by mouth 2 times daily    Yes Historical Provider, MD   Acetaminophen (TYLENOL PO) Take by mouth   Yes Historical Provider, MD   lidocaine 4 % external patch Place 1 patch onto the skin daily   Yes Historical Provider, MD   albuterol sulfate HFA (VENTOLIN HFA) 108 (90 Base) MCG/ACT inhaler Inhale 1 puff into the lungs every 4 hours as needed for Wheezing 7/22/20  Yes Leo Leija DO   folic acid (FOLVITE) 1 MG tablet Take 1 tablet by mouth daily 7/22/20  Yes Leo Leija DO   medical marijuana Take by mouth as needed. Yes Historical Provider, MD   EMGALITY 120 MG/ML SOSY every 30 days  10/1/19  Yes Historical Provider, MD   VYVANSE 60 MG CAPS Take 60 mg by mouth daily.   8/3/19  Yes Historical Provider, MD   guaiFENesin (MUCINEX) 600 MG extended release tablet Take 1 tablet by mouth 2 times daily as needed for Congestion 7/2/19  Yes Galen Mustafa, APRN - CNP   dexlansoprazole (DEXILANT) 60 MG CPDR delayed release capsule Take 1 capsule by mouth daily 12/17/18  Yes Josep Carranza DO   alendronate (FOSAMAX) 70 MG tablet Take 70 mg by mouth   Yes Historical Provider, MD   estradiol (ESTRACE) 0.5 MG tablet Take 0.5 mg by mouth daily   Yes Historical Provider, MD   baclofen (LIORESAL) 20 MG tablet Take 20 mg by mouth 3 times daily   Yes Historical Provider, MD   PAROXETINE HCL PO Take 20 mg by mouth nightly    Yes Historical Provider, MD LORazepam (ATIVAN) 1 MG tablet Take 1 mg by mouth 2 times daily  . 8/14/16  Yes Historical Provider, MD   Probiotic Product (PROBIOTIC DAILY PO) Take 2 capsules by mouth every morning    Yes Historical Provider, MD   Elastic Bandages & Supports (ABDOMINAL BINDER/ELASTIC LARGE) MISC 1 Device by Does not apply route daily 5/12/21   Kayce Walker MD   Handicap Placard MISC by Does not apply route Patient cannot walk 200 ft without stopping to rest.    Expiration 5 YRS 1/7/21   Jignesh Parish DO       Allergies   Allergen Reactions    Codeine Hives    Demerol Hives    Morphine Hives     pt states that she has tolerated Dilaudid in the past w/o reaction (5/4/11)    Topiramate      Other reaction(s): Other: See Comments  heart palpitations    Tramadol Other (See Comments)     Other reaction(s):  Other: See Comments  also seizure     Casein Nausea And Vomiting    Eggs Or Egg-Derived Products Nausea And Vomiting    Gluten Meal Nausea And Vomiting    Nsaids      Other reaction(s): GI Upset  H/o ulcers    Peanuts [Peanut Oil] Nausea And Vomiting    Prochlorperazine Edisylate Nausea And Vomiting    Trazodone And Nefazodone Other (See Comments)     Nasal sinus swelling    Whey Nausea And Vomiting       Social History     Socioeconomic History    Marital status:      Spouse name: Not on file    Number of children: Not on file    Years of education: Not on file    Highest education level: Not on file   Occupational History    Not on file   Tobacco Use    Smoking status: Current Every Day Smoker     Packs/day: 0.50     Years: 15.00     Pack years: 7.50     Types: Cigarettes     Start date: 10/28/2004    Smokeless tobacco: Never Used    Tobacco comment: smokes 5-10 cigs on stressful days then can go week without   Vaping Use    Vaping Use: Never used   Substance and Sexual Activity    Alcohol use: No    Drug use: Yes     Types: Marijuana     Comment: medical - transdermal patch - edibles  Sexual activity: Not on file   Other Topics Concern    Not on file   Social History Narrative    Not on file     Social Determinants of Health     Financial Resource Strain:     Difficulty of Paying Living Expenses:    Food Insecurity:     Worried About Running Out of Food in the Last Year:     [address] in the Last Year:    Transportation Needs:     Lack of Transportation (Medical):  Lack of Transportation (Non-Medical):    Physical Activity:     Days of Exercise per Week:     Minutes of Exercise per Session:    Stress:     Feeling of Stress :    Social Connections:     Frequency of Communication with Friends and Family:     Frequency of Social Gatherings with Friends and Family:     Attends [employer] Services:     Active Member of Clubs or Organizations:     Attends Club or Organization Meetings:     Marital Status:    Intimate Partner Violence:     Fear of Current or Ex-Partner:     Emotionally Abused:     Physically Abused:     Sexually Abused:        Family History   Problem Relation Age of Onset    High Blood Pressure Father     High Cholesterol Father     High Blood Pressure Mother     No Known Problems Sister          REVIEW OF SYSTEMS:    CONSTITUTIONAL:  negative for  fevers, chills, sweats and fatigue    RESPIRATORY:  negative for  dry cough, cough with sputum, dyspnea, wheezing and chest pain    CARDIOVASCULAR:  negative for chest pain, dyspnea, palpitations, syncope    GASTROINTESTINAL:  negative for nausea, vomiting, change in bowel habits, diarrhea, constipation and abdominal pain    MUSCULOSKELETAL: negative for muscle weakness    SKIN: negative for itching or rashes.     BEHAVIOR/PSYCH:  negative for poor appetite, increased appetite, decreased sleep and poor concentration    All other systems negative      PHYSICAL EXAM:    VITALS:  /86   Pulse 88   Temp 97.7 °F (36.5 °C) (Temporal)   Resp 16   Ht 5' 5\" (1.651 m)   Wt 180 lb (81.6 kg)   SpO2 98% BMI 29.95 kg/m²     CONSTITUTIONAL:  awake, alert, cooperative, no apparent distress, and appears stated age    EYES: PERRLA, EOMI    LUNGS:  No increased work of breathing, no audible wheezing    CARDIOVASCULAR:  regular rate and rhythm    ABDOMEN:  Soft non tender non distended     EXTREMITIES: no signs of clubbing or cyanosis. MUSCULOSKELETAL: negative for flaccid muscle tone or spastic movements. SKIN: gross examination reveals no signs of rashes, or diaphoresis. NEURO: Cranial nerves II-XII grossly intact. No signs of agitated mood.        Assessment/Plan:    Right LB pain for right L3,4,5 RFA

## 2021-05-18 NOTE — OP NOTE
2021    Patient: Seda Bergeron  :  1975  Age:  39 y.o. Sex:  female     PRE-OPERATIVE DIAGNOSIS: Right Lumbar spondylosis, lumbar facet arthropathy. POST-OPERATIVE DIAGNOSIS: Same. PROCEDURE:  Fluoroscopic-guided Right  Lumbar facet medial branch radiofrequency ablation at levels L3,4,5 (anesthetizing the L4-5 and L5-S1 facet joints). SURGEON:  KIM Kimbrough. ANESTHESIA: local    ESTIMATED BLOOD LOSS: None.  ______________________________________________________________________  HISTORY & PHYSICAL: Seda Bergeron presents today for fluoroscopic-guided Right lumbar facet medial branch radiofrequency ablation at levels L3,4,5. The potential complications of the procedure were explained to Debby Notice again today and she has elected to undergo the aforementioned procedure. [facility] complete History & Physical examination were reviewed in depth, a copy of which is in the chart. DESCRIPTION OF PROCEDURE:    After confirming written and informed consent, a time-out was performed and [facility] name and date of birth, the procedure to be performed as well as the plan for the location of the needle insertion were confirmed. The patient was brought into the procedure room and placed in the prone position on the fluoroscopy table. Standard monitors were placed and vital signs were observed throughout the procedure. The area of the lumbar spine and upper buttocks was sterilely prepped with chloraprep and draped in a sterile manner. AP fluoroscopy was used to identify and cordelia bartons point at the targeted area. A 22 gauge 10 mm radiofrequency probe was advanced toward each of these points. Once bone was contacted, negative aspiration for blood and CSF was confirmed, sensory stimulation was performed at 50 Hz and at 0.4 volts generating a pressure sensation. Motor stimulation < 2.0 volts elicited multifidus twitching without any radicular symptoms.  1 ml of 2% lidocaine was injected prior to lesioning, which was performed for 90 seconds at 90 degrees centigrade. Once the lesions were complete, a solution of 0.25% marcaine 3 cc and 40 mg DepoMedrol was injected and distributed equally through each probe. The probes were removed . The patient's back was cleaned and bandages were placed over the needle insertion sites. Disposition the patient tolerated the procedure well and there were no complications . Vital signs remained stable throughout the procedure. The patient was escorted to the recovery area where they remained until discharge and written discharge instructions for the procedure were given. Plan: Bryant Gómez will return to our pain management center as scheduled.      Christy Adams DO

## 2021-06-03 ENCOUNTER — OFFICE VISIT (OUTPATIENT)
Dept: PAIN MANAGEMENT | Age: 46
End: 2021-06-03
Payer: MEDICAID

## 2021-06-03 VITALS
HEIGHT: 65 IN | OXYGEN SATURATION: 98 % | TEMPERATURE: 97.6 F | RESPIRATION RATE: 16 BRPM | HEART RATE: 89 BPM | WEIGHT: 180 LBS | SYSTOLIC BLOOD PRESSURE: 124 MMHG | DIASTOLIC BLOOD PRESSURE: 80 MMHG | BODY MASS INDEX: 29.99 KG/M2

## 2021-06-03 DIAGNOSIS — G89.29 CHRONIC BILATERAL LOW BACK PAIN WITHOUT SCIATICA: ICD-10-CM

## 2021-06-03 DIAGNOSIS — G89.4 CHRONIC PAIN SYNDROME: Primary | ICD-10-CM

## 2021-06-03 DIAGNOSIS — M47.817 LUMBOSACRAL SPONDYLOSIS WITHOUT MYELOPATHY: ICD-10-CM

## 2021-06-03 DIAGNOSIS — M54.50 CHRONIC BILATERAL LOW BACK PAIN WITHOUT SCIATICA: ICD-10-CM

## 2021-06-03 PROCEDURE — 99203 OFFICE O/P NEW LOW 30 MIN: CPT | Performed by: PAIN MEDICINE

## 2021-06-03 PROCEDURE — 99213 OFFICE O/P EST LOW 20 MIN: CPT | Performed by: PAIN MEDICINE

## 2021-06-03 NOTE — PROGRESS NOTES
Do you currently have any of the following:    Fever: No  Headache:  No  Cough: No  Shortness of breath: No  Exposed to anyone with these symptoms: No         Mandi Merino presents to the [facility] on 6/3/2021. Mandi is complaining of pain pain all over. The pain is constant. The pain is described as stabbing and sharp. Pain is rated on her best day at a 4, on her worst day at a 8, and on average at a 6 on the VAS scale. She took her last dose of medical marijuana patch . Any procedures since your last visit: Yes, with 60-70 % relief. Pacemaker or defibrillator: No managed by . She is not on NSAIDS and is not on anticoagulation medications to include none and is managed by . Medication Contract and Consent for Opioid Use Documents Filed      No documents found                /80   Pulse 89   Temp 97.6 °F (36.4 °C) (Infrared)   Resp 16   Ht 5' 5\" (1.651 m)   Wt 180 lb (81.6 kg)   SpO2 98%   BMI 29.95 kg/m²      No LMP recorded. Patient has had a hysterectomy.

## 2021-06-09 DIAGNOSIS — G89.4 CHRONIC PAIN SYNDROME: ICD-10-CM

## 2021-06-09 DIAGNOSIS — G89.29 CHRONIC BILATERAL LOW BACK PAIN WITHOUT SCIATICA: ICD-10-CM

## 2021-06-09 DIAGNOSIS — M54.50 CHRONIC BILATERAL LOW BACK PAIN WITHOUT SCIATICA: ICD-10-CM

## 2021-06-09 DIAGNOSIS — M47.817 LUMBOSACRAL SPONDYLOSIS WITHOUT MYELOPATHY: ICD-10-CM

## 2021-06-09 RX ORDER — DIAZEPAM 10 MG/1
TABLET ORAL
Qty: 1 TABLET | Refills: 0 | Status: SHIPPED | OUTPATIENT
Start: 2021-06-09 | End: 2021-07-09

## 2021-06-10 ENCOUNTER — HOSPITAL ENCOUNTER (OUTPATIENT)
Age: 46
Setting detail: OUTPATIENT SURGERY
Discharge: HOME | End: 2021-06-10
Attending: PAIN MEDICINE | Admitting: PAIN MEDICINE
Payer: MEDICAID

## 2021-06-10 ENCOUNTER — APPOINTMENT (OUTPATIENT)
Dept: GENERAL RADIOLOGY | Age: 46
End: 2021-06-10
Attending: PAIN MEDICINE
Payer: MEDICAID

## 2021-06-10 VITALS
BODY MASS INDEX: 29.99 KG/M2 | HEIGHT: 65 IN | DIASTOLIC BLOOD PRESSURE: 82 MMHG | RESPIRATION RATE: 16 BRPM | HEART RATE: 70 BPM | SYSTOLIC BLOOD PRESSURE: 135 MMHG | WEIGHT: 180 LBS | OXYGEN SATURATION: 98 %

## 2021-06-10 PROCEDURE — 2709999900 HC NON-CHARGEABLE SUPPLY: Performed by: PAIN MEDICINE

## 2021-06-10 PROCEDURE — 64635 DESTROY LUMB/SAC FACET JNT: CPT | Performed by: PAIN MEDICINE

## 2021-06-10 PROCEDURE — 2500000003 HC RX 250 WO HCPCS: Performed by: PAIN MEDICINE

## 2021-06-10 PROCEDURE — 6370000000 HC RX 637 (ALT 250 FOR IP): Performed by: PAIN MEDICINE

## 2021-06-10 PROCEDURE — 7100000010 HC PHASE II RECOVERY - FIRST 15 MIN: Performed by: PAIN MEDICINE

## 2021-06-10 PROCEDURE — 7100000011 HC PHASE II RECOVERY - ADDTL 15 MIN: Performed by: PAIN MEDICINE

## 2021-06-10 PROCEDURE — 3600000002 HC SURGERY LEVEL 2 BASE: Performed by: PAIN MEDICINE

## 2021-06-10 PROCEDURE — 64636 DESTROY L/S FACET JNT ADDL: CPT | Performed by: PAIN MEDICINE

## 2021-06-10 PROCEDURE — 3600000012 HC SURGERY LEVEL 2 ADDTL 15MIN: Performed by: PAIN MEDICINE

## 2021-06-10 PROCEDURE — 3209999900 FLUORO FOR SURGICAL PROCEDURES

## 2021-06-10 PROCEDURE — 6360000002 HC RX W HCPCS: Performed by: PAIN MEDICINE

## 2021-06-10 RX ORDER — METHYLPREDNISOLONE ACETATE 40 MG/ML
INJECTION, SUSPENSION INTRA-ARTICULAR; INTRALESIONAL; INTRAMUSCULAR; SOFT TISSUE PRN
Status: DISCONTINUED | OUTPATIENT
Start: 2021-06-10 | End: 2021-06-10 | Stop reason: ALTCHOICE

## 2021-06-10 RX ORDER — BUPIVACAINE HYDROCHLORIDE 2.5 MG/ML
INJECTION, SOLUTION EPIDURAL; INFILTRATION; INTRACAUDAL PRN
Status: DISCONTINUED | OUTPATIENT
Start: 2021-06-10 | End: 2021-06-10 | Stop reason: ALTCHOICE

## 2021-06-10 RX ORDER — LIDOCAINE HYDROCHLORIDE 20 MG/ML
INJECTION, SOLUTION EPIDURAL; INFILTRATION; INTRACAUDAL; PERINEURAL PRN
Status: DISCONTINUED | OUTPATIENT
Start: 2021-06-10 | End: 2021-06-10 | Stop reason: ALTCHOICE

## 2021-06-10 RX ORDER — ACETAMINOPHEN 500 MG
1000 TABLET ORAL ONCE
Status: COMPLETED | OUTPATIENT
Start: 2021-06-10 | End: 2021-06-10

## 2021-06-10 RX ORDER — LIDOCAINE 4 G/G
1 PATCH TOPICAL ONCE
Status: DISCONTINUED | OUTPATIENT
Start: 2021-06-10 | End: 2021-06-10 | Stop reason: HOSPADM

## 2021-06-10 RX ADMIN — ACETAMINOPHEN 1000 MG: 500 TABLET ORAL at 10:34

## 2021-06-10 ASSESSMENT — PAIN SCALES - GENERAL
PAINLEVEL_OUTOF10: 8
PAINLEVEL_OUTOF10: 8

## 2021-06-10 ASSESSMENT — PAIN DESCRIPTION - PAIN TYPE: TYPE: SURGICAL PAIN;CHRONIC PAIN

## 2021-06-10 ASSESSMENT — PAIN - FUNCTIONAL ASSESSMENT: PAIN_FUNCTIONAL_ASSESSMENT: 0-10

## 2021-06-10 ASSESSMENT — PAIN DESCRIPTION - DESCRIPTORS: DESCRIPTORS: STABBING

## 2021-06-10 NOTE — OP NOTE
which was performed for 90 seconds at 90 degrees centigrade. Once the lesions were complete, a solution of 0.25% marcaine 3 cc and 40 mg DepoMedrol was injected and distributed equally through each probe. The probes were removed . The patient's back was cleaned and bandages were placed over the needle insertion sites. Disposition the patient tolerated the procedure well and there were no complications . Vital signs remained stable throughout the procedure. The patient was escorted to the recovery area where they remained until discharge and written discharge instructions for the procedure were given. Plan: Nancy Gonsales will return to our pain management center as scheduled.      Lexy Jerome DO

## 2021-06-10 NOTE — PROGRESS NOTES
Patient stated relief with tylenol and lidocaine patch  Patient ambulated with cane and states weakness is at baseline  Patient given discharge instructions and verbalizes understanding  Patient discharged home safely with mother

## 2021-07-06 ENCOUNTER — VIRTUAL VISIT (OUTPATIENT)
Dept: PRIMARY CARE CLINIC | Age: 46
End: 2021-07-06
Payer: MEDICAID

## 2021-07-06 DIAGNOSIS — F33.2 MDD (MAJOR DEPRESSIVE DISORDER), RECURRENT SEVERE, WITHOUT PSYCHOSIS (HCC): ICD-10-CM

## 2021-07-06 DIAGNOSIS — Z91.018 MULTIPLE FOOD ALLERGIES: ICD-10-CM

## 2021-07-06 DIAGNOSIS — J30.89 NON-SEASONAL ALLERGIC RHINITIS, UNSPECIFIED TRIGGER: ICD-10-CM

## 2021-07-06 DIAGNOSIS — J45.20 MILD INTERMITTENT ASTHMA WITHOUT COMPLICATION: ICD-10-CM

## 2021-07-06 DIAGNOSIS — I10 ESSENTIAL HYPERTENSION: ICD-10-CM

## 2021-07-06 DIAGNOSIS — F17.210 CIGARETTE NICOTINE DEPENDENCE WITHOUT COMPLICATION: Primary | ICD-10-CM

## 2021-07-06 DIAGNOSIS — M06.09 RHEUMATOID ARTHRITIS OF MULTIPLE SITES WITH NEGATIVE RHEUMATOID FACTOR (HCC): ICD-10-CM

## 2021-07-06 PROCEDURE — 99214 OFFICE O/P EST MOD 30 MIN: CPT | Performed by: FAMILY MEDICINE

## 2021-07-06 ASSESSMENT — ENCOUNTER SYMPTOMS
COUGH: 0
SORE THROAT: 0
ORTHOPNEA: 0
ABDOMINAL PAIN: 0
WHEEZING: 0
DIARRHEA: 0
APNEA: 0
SHORTNESS OF BREATH: 0
EYE ITCHING: 0
EYE PAIN: 0
SINUS PRESSURE: 0
NAUSEA: 0
EYE REDNESS: 0
RHINORRHEA: 0
CHEST TIGHTNESS: 0
BACK PAIN: 0
VOMITING: 0
CONSTIPATION: 0
BLOOD IN STOOL: 0
COLOR CHANGE: 0

## 2021-07-06 NOTE — PROGRESS NOTES
TeleMedicine Video Visit    Mandi Merino, was evaluated through a synchronous (real-time) audio-video encounter. The patient (or guardian if applicable) is aware that this is a billable service. Verbal consent to proceed has been obtained within the past 12 months. The visit was conducted pursuant to the emergency declaration under the [facility], [address] [place] [address] and the [employer] General Act. Patient identification was verified, and a caregiver was present when appropriate. The patient was located in a state where the provider was credentialed to provide care. Patient identification was verified at the start of the visit, including the patient's telephone number and physical location. I discussed with the patient the nature of our telehealth visits, that:     1. Due to the nature of an audio- video modality, the only components of a physical exam that could be done are the elements supported by direct observation. 2. I would evaluate the patient and recommend diagnostics and treatments based on my assessment. 3. If it was felt that the patient should be evaluated in clinic or an emergency room setting, then they would be directed there. 4. Our sessions are not being recorded and that personal health information is protected. 5. Our team would provide follow up care in person if/when the patient needs it. Patient's location: home address in [facility]  Physician  location other address in [facility] other people involved in call  n/a      On this date 7/6/2021 I have spent 30 minutes reviewing previous notes, test results and face to face (virtual) with the patient discussing the diagnosis and importance of compliance with the treatment plan as well as documenting on the day of the visit. This visit was completed virtually using D[employer]. me        Chief Complaint:     Chief Complaint   Patient presents with    Nicotine Dependence     discuss new medication to stop smoking    Other         Other  Associated symptoms include arthralgias, fatigue, myalgias and weakness. Pertinent negatives include no abdominal pain, chest pain, congestion, coughing, diaphoresis, fever, headaches, nausea, neck pain, numbness, rash, sore throat or vomiting. Generalized Body Aches  This is a chronic problem. The current episode started more than 1 year ago. The problem occurs daily. The problem has been waxing and waning. Associated symptoms include arthralgias, fatigue, myalgias and weakness. Pertinent negatives include no abdominal pain, chest pain, congestion, coughing, diaphoresis, fever, headaches, nausea, neck pain, numbness, rash, sore throat or vomiting. Nothing aggravates the symptoms. She has tried nothing for the symptoms. The treatment provided no relief. Fatigue  This is a chronic problem. The current episode started more than 1 month ago. The problem occurs daily. The problem has been waxing and waning. Associated symptoms include arthralgias, fatigue, myalgias and weakness. Pertinent negatives include no abdominal pain, chest pain, congestion, coughing, diaphoresis, fever, headaches, nausea, neck pain, numbness, rash, sore throat or vomiting. Nothing aggravates the symptoms. She has tried nothing for the symptoms. The treatment provided no relief. Hypertension  This is a chronic problem. The current episode started more than 1 month ago. The problem is unchanged. The problem is controlled. Associated symptoms include malaise/fatigue. Pertinent negatives include no chest pain, headaches, neck pain, orthopnea, palpitations, peripheral edema, PND or shortness of breath. There are no associated agents to hypertension. There are no known risk factors for coronary artery disease. Past treatments include beta blockers. The current treatment provides significant improvement. There is no history of CAD/MI, CVA or PVD.  There is no history of a hypertension causing med, pheochromocytoma, renovascular disease, sleep apnea or a thyroid problem.        Patient Active Problem List   Diagnosis    Agoraphobia with panic attacks    Fibromyalgia    Lumbar disc disorder    GERD (gastroesophageal reflux disease)    Rheumatoid arthritis ([address]    MDD (major depressive disorder), recurrent severe, without psychosis ([address]    Abnormality of gait and mobility    Migraine without status migrainosus, not intractable    Essential hypertension    Vitamin D deficiency    Irritable bowel syndrome with constipation    POTS (postural orthostatic tachycardia syndrome)    Malabsorption syndrome    Lumbosacral spondylosis without myelopathy    Mild intermittent asthma without complication    Cigarette nicotine dependence without complication       Past Medical History:   Diagnosis Date    Anxiety     Arthritis     Asthma exacerbation with COPD (chronic obstructive pulmonary disease) (HCC)     Essential hypertension     Fibromyalgia     GERD (gastroesophageal reflux disease)     Major depression     Malabsorption syndrome     POTS (postural orthostatic tachycardia syndrome)     PTSD (post-traumatic stress disorder)     Rheumatoid arthritis ([address]     Seizures ([address] 2005    due to Ultram     Sx of RLS (restless legs syndrome)        Past Surgical History:   Procedure Laterality Date    ABDOMEN SURGERY  3/5/12    endometreosis    ANESTHESIA NERVE BLOCK Bilateral 6/16/2020    BILATERAL L3 L4 L5 MEDIAL NERVE BRANCH BLOCK   #1 performed by Destiney Dumont DO at [facility] Bilateral 7/14/2020    BILATERAL L3 4 5 MEDIAL NERVE BRANCH BLOCK # 2 performed by [facility]y Dumont [facility] at [facility] OR    APPENDECTOMY      BRONCHOSCOPY  11/01/2016    BRONCHOSCOPY  12/14/2016    CHOLECYSTECTOMY  02/25/2016    lap    COLONOSCOPY      ENDOMETRIAL ABLATION      ENDOSCOPY, COLON, DIAGNOSTIC      HYSTERECTOMY      PAIN MANAGEMENT PROCEDURE Left 7/28/2020    LEFT L3 4 5 MEDIAL NERVE BRANCH RADIOFREQUENCY ABLATION performed by Manuela Dance, DO at [place] [id number] Right 8/27/2020    RIGHT L3 4 5 MEDIAL NERVE BRANCH RADIOFREQUENCY ABLATION performed by Manuela Dance, DO at [place] [id number] Right 5/18/2021    RIGHT L3, 4, 5  RADIOFREQUENCY ABLATION performed by Manuela Dance, DO at [place] [id number] Left 6/10/2021    LEFT L3, 4, 5  RADIOFREQUENCY ABLATION performed by Manuela Dance, DO at [address] [place] [address]      related to endometriosis    MARCELINO AND BSO      TONSILLECTOMY         Current Outpatient Medications   Medication Sig Dispense Refill    nicotine (NICOTROL) 10 MG inhaler Inhale 1 puff into the lungs as needed for Smoking cessation 1 Inhaler 3    diazePAM (VALIUM) 10 MG tablet Take one tab one hour prior to the procedure 1 tablet 0    sodium chloride POWD powder Take 1 g by mouth 3 times daily (with meals)      Elastic Bandages & Supports (ABDOMINAL BINDER/ELASTIC LARGE) MISC 1 Device by Does not apply route daily 1 each 0    propranolol (INDERAL) 20 MG tablet Take 1 tablet by mouth 3 times daily 90 tablet 3    cetirizine (ZYRTEC) 10 MG tablet TAKE 1 TABLET BY MOUTH DAILY 30 tablet 5    montelukast (SINGULAIR) 10 MG tablet TAKE 1 TABLET BY MOUTH DAILY 30 tablet 3    Handicap Placard MISC by Does not apply route Patient cannot walk 200 ft without stopping to rest.    Expiration 5 YRS 1 each 0    cimetidine (TAGAMET) 300 MG tablet Take 300 mg by mouth 2 times daily       Acetaminophen (TYLENOL PO) Take by mouth      lidocaine 4 % external patch Place 1 patch onto the skin daily      albuterol sulfate HFA (VENTOLIN HFA) 108 (90 Base) MCG/ACT inhaler Inhale 1 puff into the lungs every 4 hours as needed for Wheezing 1 Inhaler 2    folic acid (FOLVITE) 1 MG tablet Take 1 tablet by mouth daily 30 tablet 5    medical marijuana Take by mouth as needed.       EMGALITY 120 MG/ML SOSY every 30 days       VYVANSE 60 MG CAPS Take 60 mg by mouth daily.  dexlansoprazole (DEXILANT) 60 MG CPDR delayed release capsule Take 1 capsule by mouth daily 30 capsule 1    estradiol (ESTRACE) 0.5 MG tablet Take 0.5 mg by mouth daily      baclofen (LIORESAL) 20 MG tablet Take 20 mg by mouth 3 times daily      PAROXETINE HCL PO Take 20 mg by mouth nightly       LORazepam (ATIVAN) 1 MG tablet Take 1 mg by mouth 2 times daily  .  Probiotic Product (PROBIOTIC DAILY PO) Take 2 capsules by mouth every morning       diclofenac sodium (VOLTAREN) 1 % GEL Apply 4 g topically 4 times daily as needed for Pain 100 g 5     No current facility-administered medications for this visit. Allergies   Allergen Reactions    Codeine Hives    Demerol Hives    Morphine Hives     pt states that she has tolerated Dilaudid in the past w/o reaction (5/4/11)    Topiramate      Other reaction(s): Other: See Comments  heart palpitations    Tramadol Other (See Comments)     Other reaction(s):  Other: See Comments  also seizure     Casein Nausea And Vomiting    Eggs Or Egg-Derived Products Nausea And Vomiting    Gluten Meal Nausea And Vomiting    Nsaids      Other reaction(s): GI Upset  H/o ulcers    Peanuts [Peanut Oil] Nausea And Vomiting    Prochlorperazine Edisylate Nausea And Vomiting    Trazodone And Nefazodone Other (See Comments)     Nasal sinus swelling    Whey Nausea And Vomiting       Social History     Socioeconomic History    Marital status:      Spouse name: None    Number of children: None    Years of education: None    Highest education level: None   Occupational History    None   Tobacco Use    Smoking status: Current Some Day Smoker     Packs/day: 0.25     Years: 15.00     Pack years: 3.75     Types: Cigarettes     Start date: 10/28/2004    Smokeless tobacco: Never Used    Tobacco comment: smokes 5-10 cigs on stressful days then can go week without   Vaping Use    Vaping Use: Never used   Substance and Sexual Activity    Alcohol use: No    Drug use: Yes     Types: Marijuana     Comment: medical - transdermal patch - edibles    Sexual activity: None   Other Topics Concern    None   Social History Narrative    None     Social Determinants of Health     Financial Resource Strain:     Difficulty of Paying Living Expenses:    Food Insecurity:     Worried About Running Out of Food in the Last Year:     Ran Out of Food in the Last Year:    Transportation Needs:     Lack of Transportation (Medical):  Lack of Transportation (Non-Medical):    Physical Activity:     Days of Exercise per Week:     Minutes of Exercise per Session:    Stress:     Feeling of Stress :    Social Connections:     Frequency of Communication with Friends and Family:     Frequency of Social Gatherings with Friends and Family:     Attends Denominational Services:     Active Member of Clubs or Organizations:     Attends Club or Organization Meetings:     Marital Status:    Intimate Partner Violence:     Fear of Current or Ex-Partner:     Emotionally Abused:     Physically Abused:     Sexually Abused:        Family History   Problem Relation Age of Onset    High Blood Pressure Father     High Cholesterol Father     High Blood Pressure Mother     No Known Problems Sister           Review of Systems   Constitutional: Positive for fatigue and malaise/fatigue. Negative for activity change, appetite change, diaphoresis and fever. HENT: Negative for congestion, ear pain, hearing loss, nosebleeds, rhinorrhea, sinus pressure and sore throat. Eyes: Negative for pain, redness, itching and visual disturbance. Respiratory: Negative for apnea, cough, chest tightness, shortness of breath and wheezing. Cardiovascular: Negative for chest pain, palpitations, orthopnea, leg swelling and PND. Gastrointestinal: Negative for abdominal pain, blood in stool, constipation, diarrhea, nausea and vomiting. Endocrine: Negative. Genitourinary: Negative for decreased urine volume, difficulty urinating, dysuria, frequency, hematuria and urgency. Musculoskeletal: Positive for arthralgias and myalgias. Negative for back pain, gait problem and neck pain. Skin: Negative for color change and rash. Allergic/Immunologic: Negative for environmental allergies and food allergies. Neurological: Positive for weakness. Negative for dizziness, light-headedness, numbness and headaches. Hematological: Negative for adenopathy. Does not bruise/bleed easily. Psychiatric/Behavioral: Negative for behavioral problems, dysphoric mood and sleep disturbance. The patient is not nervous/anxious and is not hyperactive. All other systems reviewed and are negative. Patient-Reported Vitals 7/6/2021   Patient-Reported Weight 180lb   Patient-Reported Height 5 5   Patient-Reported Systolic -   Patient-Reported Diastolic -   Patient-Reported Pulse -   Patient-Reported Temperature -   Patient-Reported SpO2 -      There were no vitals taken for this visit. Physical Exam  Vitals and nursing note reviewed. Constitutional:       General: She is not in acute distress. Appearance: Normal appearance. She is not toxic-appearing. Pulmonary:      Effort: Pulmonary effort is normal. No respiratory distress. Neurological:      Mental Status: She is alert and oriented to person, place, and time.    Psychiatric:         Attention and Perception: Attention normal.         Mood and Affect: Mood and affect normal.         Speech: Speech normal.                                 ASSESSMENT/PLAN:    Patient Active Problem List   Diagnosis    Agoraphobia with panic attacks    Fibromyalgia    Lumbar disc disorder    GERD (gastroesophageal reflux disease)    Rheumatoid arthritis ([address]    MDD (major depressive disorder), recurrent severe, without psychosis [address]    Abnormality of gait and mobility    Migraine without status migrainosus,

## 2021-07-12 RX ORDER — MONTELUKAST SODIUM 10 MG/1
10 TABLET ORAL DAILY
Qty: 30 TABLET | Refills: 3 | Status: SHIPPED
Start: 2021-07-12 | End: 2021-11-02 | Stop reason: SDUPTHER

## 2021-07-14 ENCOUNTER — OFFICE VISIT (OUTPATIENT)
Dept: PAIN MANAGEMENT | Age: 46
End: 2021-07-14
Payer: MEDICAID

## 2021-07-14 VITALS
HEART RATE: 112 BPM | DIASTOLIC BLOOD PRESSURE: 82 MMHG | RESPIRATION RATE: 18 BRPM | TEMPERATURE: 97.3 F | OXYGEN SATURATION: 98 % | SYSTOLIC BLOOD PRESSURE: 128 MMHG | HEIGHT: 65 IN | WEIGHT: 180 LBS | BODY MASS INDEX: 29.99 KG/M2

## 2021-07-14 DIAGNOSIS — M47.817 LUMBOSACRAL SPONDYLOSIS WITHOUT MYELOPATHY: ICD-10-CM

## 2021-07-14 DIAGNOSIS — G89.29 CHRONIC BILATERAL LOW BACK PAIN WITHOUT SCIATICA: ICD-10-CM

## 2021-07-14 DIAGNOSIS — M51.9 LUMBAR DISC DISORDER: ICD-10-CM

## 2021-07-14 DIAGNOSIS — M79.10 MYALGIA: ICD-10-CM

## 2021-07-14 DIAGNOSIS — G89.4 CHRONIC PAIN SYNDROME: Primary | ICD-10-CM

## 2021-07-14 DIAGNOSIS — M54.50 CHRONIC BILATERAL LOW BACK PAIN WITHOUT SCIATICA: ICD-10-CM

## 2021-07-14 PROCEDURE — 99213 OFFICE O/P EST LOW 20 MIN: CPT | Performed by: PAIN MEDICINE

## 2021-07-14 NOTE — PROGRESS NOTES
Do you currently have any of the following:    Fever: No  Headache:  No  Cough: No  Shortness of breath: No  Exposed to anyone with these symptoms: No         Mandi Merino presents to the [address] [place] [address] on 7/14/2021. Mandi is complaining of pain lower  Back and hips. The pain is constant. The pain is described as throbbing, sharp and spasms. Pain is rated on her best day at a 3, on her worst day at a 9, and on average at a 6 on the VAS scale. She took her last dose of medical marijuana, lidocaine patches, tylenol . Any procedures since your last visit: Yes, with 75 % relief except the spasms. Pacemaker or defibrillator: No managed by . She is not on NSAIDS and is not on anticoagulation medications to include none and is managed by . Medication Contract and Consent for Opioid Use Documents Filed      No documents found                /82   Pulse 112   Temp 97.3 °F (36.3 °C) (Infrared)   Resp 18   Ht 5' 5\" (1.651 m)   Wt 180 lb (81.6 kg)   SpO2 98%   BMI 29.95 kg/m²      No LMP recorded. Patient has had a hysterectomy.

## 2021-07-14 NOTE — PROGRESS NOTES
Take 300 mg by mouth 2 times daily    Yes Historical Provider, MD   Acetaminophen (TYLENOL PO) Take by mouth   Yes Historical Provider, MD   lidocaine 4 % external patch Place 1 patch onto the skin daily   Yes Historical Provider, MD   albuterol sulfate HFA (VENTOLIN HFA) 108 (90 Base) MCG/ACT inhaler Inhale 1 puff into the lungs every 4 hours as needed for Wheezing 7/22/20  Yes Jignesh Marcano, DO   folic acid (FOLVITE) 1 MG tablet Take 1 tablet by mouth daily 7/22/20  Yes Esperanza Gil, DO   medical marijuana Take by mouth as needed. Yes Historical Provider, MD   EMGALITY 120 MG/ML SOSY every 30 days  10/1/19  Yes Historical Provider, MD   VYVANSE 60 MG CAPS Take 60 mg by mouth daily. 8/3/19  Yes Historical Provider, MD   dexlansoprazole (DEXILANT) 60 MG CPDR delayed release capsule Take 1 capsule by mouth daily 12/17/18  Yes Nathan Schmitz,    estradiol (ESTRACE) 0.5 MG tablet Take 0.5 mg by mouth daily   Yes Historical Provider, MD   baclofen (LIORESAL) 20 MG tablet Take 20 mg by mouth 3 times daily   Yes Historical Provider, MD   PAROXETINE HCL PO Take 20 mg by mouth nightly    Yes Historical Provider, MD   LORazepam (ATIVAN) 1 MG tablet Take 1 mg by mouth 2 times daily  . 8/14/16  Yes Historical Provider, MD   Probiotic Product (PROBIOTIC DAILY PO) Take 2 capsules by mouth every morning    Yes Historical Provider, MD       Allergies   Allergen Reactions    Codeine Hives    Demerol Hives    Morphine Hives     pt states that she has tolerated Dilaudid in the past w/o reaction (5/4/11)    Topiramate      Other reaction(s): Other: See Comments  heart palpitations    Tramadol Other (See Comments)     Other reaction(s):  Other: See Comments  also seizure     Casein Nausea And Vomiting    Eggs Or Egg-Derived Products Nausea And Vomiting    Gluten Meal Nausea And Vomiting    Nsaids      Other reaction(s): GI Upset  H/o ulcers    Peanuts [Peanut Oil] Nausea And Vomiting    Prochlorperazine Edisylate Nausea And Vomiting    Trazodone And Nefazodone Other (See Comments)     Nasal sinus swelling    Whey Nausea And Vomiting       Social History     Socioeconomic History    Marital status:      Spouse name: Not on file    Number of children: Not on file    Years of education: Not on file    Highest education level: Not on file   Occupational History    Not on file   Tobacco Use    Smoking status: Current Some Day Smoker     Packs/day: 0.25     Years: 15.00     Pack years: 3.75     Types: Cigarettes     Start date: 10/28/2004    Smokeless tobacco: Never Used    Tobacco comment: smokes 5-10 cigs on stressful days then can go week without   Vaping Use    Vaping Use: Never used   Substance and Sexual Activity    Alcohol use: No    Drug use: Yes     Types: Marijuana     Comment: medical - transdermal patch - edibles    Sexual activity: Not on file   Other Topics Concern    Not on file   Social History Narrative    Not on file     Social Determinants of Health     Financial Resource Strain:     Difficulty of Paying Living Expenses:    Food Insecurity:     Worried About Running Out of Food in the Last Year:     [address] in the Last Year:    Transportation Needs:     Lack of Transportation (Medical):      Lack of Transportation (Non-Medical):    Physical Activity:     Days of Exercise per Week:     Minutes of Exercise per Session:    Stress:     Feeling of Stress :    Social Connections:     Frequency of Communication with Friends and Family:     Frequency of Social Gatherings with Friends and Family:     Attends Islam Services:     Active Member of Clubs or Organizations:     Attends Club or Organization Meetings:     Marital Status:    Intimate Partner Violence:     Fear of Current or Ex-Partner:     Emotionally Abused:     Physically Abused:     Sexually Abused:        Family History   Problem Relation Age of Onset    High Blood Pressure Father     High Cholesterol Father     High Blood Pressure Mother     No Known Problems Sister        REVIEW OF SYSTEMS:     Mandi denies fever/chills, chest pain, shortness of breath, new bowel or bladder complaints. All other review of systems was negative. PHYSICAL EXAMINATION:      /82   Pulse 112   Temp 97.3 °F (36.3 °C) (Infrared)   Resp 18   Ht 5' 5\" (1.651 m)   Wt 180 lb (81.6 kg)   SpO2 98%   BMI 29.95 kg/m²     General:      General appearance:pleasant and well-hydrated, in no distress and A & O x3  Build:Overweight  Function:Rises from a seated position with difficulty, mild    HEENT:    Head:normocephalic, atraumatic  Pupils:regular, round, equal  Sclera: icterus absent    Lungs:    Breathing:normal breathing pattern    Abdomen:    Shape:non-distended  Tenderness:none  Guarding:none    Lumbar spine:    Spine inspection:well-healing injection sites on the right  CVA tenderness:No   Palpation:tenderness paravertebral muscles, left, right positive   Range of motion:abnormal mildly in lateral bending, flexion, extension rotation bilateral and is painful. Musculoskeletal:    Trigger points in Paraveteral:absent bilaterally  SI joint tenderness:positive right, positive left              MARGY test:not done right, not done left  Piriformis tenderness:negative right, negative left  Trochanteric bursa tenderness:negative right, negative left  SLR:negative right, negative left, sitting     Extremities:    Tremors:None bilaterally upper and lower  Range of motion:pain with internal rotation of hips negative.   Intact:Yes  Varicose veins:absent   Pulses:present Lt radial  Cyanosis:none  Edema:none x all 4 extremities    Neurological:    Sensory:normal to light touch BLE  Motor:                Right Quadriceps5/5          Left Quadriceps5/5           Right Gastrocnemius5/5    Left Gastrocnemius5/5  Right Ant Tibialis5/5  Left Ant Tibialis5/5    Reflexes:    Right Quadriceps reflex2+  Left Quadriceps reflex2+  Right Achilles reflex2+  Left Achilles reflex2+  Gait:normal station, with a quad cane    Dermatology:    Skin:no rashes or lesions noted     Impression:     LBP  Lumbar MRI shows DDD without sig central or foraminal stenosis  Referred by NSGY for procedures  Extensive PMHx - RLS, chronic migraines, seizures, rheumatoid arthritis, POTS, GERD, fibromyalgia, HTN, CHANNING, colitis, and a neuromuscular disorder  Hx SCS TRIAL for endometriosis which was complicated by a MRSA infection tx with PICC line abx  Has tried baclofen, lidocaine patches, and medical THC with moderate relief      Plan:  Urine screen deferred  OAR[facility] report reviewed  failed diclofenac gel due to GI upset  Right L3,4,5 RFA with 60% relief, left with 80% relief (valium for presedation, needs )  B/l lumbar paraspinal TPI under US guidance  Consider b/l SIJ injection prn  PT - continue, neg leg length discrep. Patient encouraged to stay active and to lose weight  Treatment plan discussed with the patient including medications and procedure side effects     We discussed with the patient that combining opioids, benzodiazepines, alcohol, illicit drugs or sleep aids increases the risk of respiratory depression including death. We discussed that these medications may cause drowsiness, sedation or dizziness and have counseled the patient not to drive or operate machinery. We have discussed that these medications will be prescribed only by one provider. We have discussed with the patient about age related risk factors and have thoroughly discussed the importance of taking these medications as prescribed. The patient verbalizes understanding. Cc:  Referring physician    KIM Donahue.

## 2021-08-04 ENCOUNTER — PROCEDURE VISIT (OUTPATIENT)
Dept: PAIN MANAGEMENT | Age: 46
End: 2021-08-04
Payer: MEDICAID

## 2021-08-04 ENCOUNTER — TELEPHONE (OUTPATIENT)
Dept: PRIMARY CARE CLINIC | Age: 46
End: 2021-08-04

## 2021-08-04 VITALS
BODY MASS INDEX: 29.99 KG/M2 | HEART RATE: 91 BPM | SYSTOLIC BLOOD PRESSURE: 124 MMHG | OXYGEN SATURATION: 98 % | RESPIRATION RATE: 16 BRPM | WEIGHT: 180 LBS | DIASTOLIC BLOOD PRESSURE: 82 MMHG | TEMPERATURE: 96.7 F | HEIGHT: 65 IN

## 2021-08-04 DIAGNOSIS — M79.10 MYALGIA: Primary | ICD-10-CM

## 2021-08-04 DIAGNOSIS — M51.9 LUMBAR DISC DISORDER: ICD-10-CM

## 2021-08-04 DIAGNOSIS — R26.9 ABNORMALITY OF GAIT AND MOBILITY: ICD-10-CM

## 2021-08-04 DIAGNOSIS — M06.09 RHEUMATOID ARTHRITIS OF MULTIPLE SITES WITH NEGATIVE RHEUMATOID FACTOR (HCC): Primary | ICD-10-CM

## 2021-08-04 DIAGNOSIS — G89.4 CHRONIC PAIN SYNDROME: ICD-10-CM

## 2021-08-04 PROCEDURE — 20553 NJX 1/MLT TRIGGER POINTS 3/>: CPT | Performed by: PAIN MEDICINE

## 2021-08-04 PROCEDURE — 99213 OFFICE O/P EST LOW 20 MIN: CPT | Performed by: PAIN MEDICINE

## 2021-08-04 PROCEDURE — 76942 ECHO GUIDE FOR BIOPSY: CPT | Performed by: PAIN MEDICINE

## 2021-08-04 RX ORDER — BUPIVACAINE HYDROCHLORIDE 2.5 MG/ML
10 INJECTION, SOLUTION EPIDURAL; INFILTRATION; INTRACAUDAL ONCE
Status: COMPLETED | OUTPATIENT
Start: 2021-08-04 | End: 2021-08-04

## 2021-08-04 RX ADMIN — BUPIVACAINE HYDROCHLORIDE 10 ML: 2.5 INJECTION, SOLUTION EPIDURAL; INFILTRATION; INTRACAUDAL at 15:33

## 2021-08-04 NOTE — PROGRESS NOTES
Do you currently have any of the following:    Fever: No  Headache:  No  Cough: No  Shortness of breath: No  Exposed to anyone with these symptoms: No         Mandi Merino presents to the [facility] on 8/4/[facility]21. Mandi is complaining of pain back and neck. The pain is constant. The pain is described as stabbing. Pain is rated on her best day at a 4, on her worst day at a 9, and on average at a 7 on the VAS scale. She took her last dose of medical marijuana . Any procedures since your last visit: No, with  % relief. Pacemaker or defibrillator: No managed by . She is not on NSAIDS and is not on anticoagulation medications to include none and is managed by . Medication Contract and Consent for Opioid Use Documents Filed      No documents found                /82   Pulse 91   Temp 96.7 °F (35.9 °C) (Infrared)   Resp 16   Ht 5' 5\" (1.651 m)   Wt 180 lb (81.6 kg)   SpO2 98%   BMI 29.95 kg/m²      No LMP recorded. Patient has had a hysterectomy.

## 2021-08-04 NOTE — TELEPHONE ENCOUNTER
Kingsley called from [address] [place] [address] Drive, [id number] stating pt would like an order for an elevated toilet seat with bars that can help her get up. Will you add? Pt can use [employer] or [facility]. Please call Kingsley back at [phone].

## 2021-09-23 ENCOUNTER — OFFICE VISIT (OUTPATIENT)
Dept: PRIMARY CARE CLINIC | Age: 46
End: 2021-09-23
Payer: MEDICAID

## 2021-09-23 VITALS
TEMPERATURE: 97.8 F | DIASTOLIC BLOOD PRESSURE: 72 MMHG | OXYGEN SATURATION: 98 % | WEIGHT: 198 LBS | HEART RATE: 90 BPM | BODY MASS INDEX: 32.95 KG/M2 | SYSTOLIC BLOOD PRESSURE: 126 MMHG

## 2021-09-23 DIAGNOSIS — F33.2 MDD (MAJOR DEPRESSIVE DISORDER), RECURRENT SEVERE, WITHOUT PSYCHOSIS (HCC): ICD-10-CM

## 2021-09-23 DIAGNOSIS — R26.9 ABNORMALITY OF GAIT AND MOBILITY: ICD-10-CM

## 2021-09-23 DIAGNOSIS — F40.01 AGORAPHOBIA WITH PANIC ATTACKS: ICD-10-CM

## 2021-09-23 DIAGNOSIS — M47.817 LUMBOSACRAL SPONDYLOSIS WITHOUT MYELOPATHY: ICD-10-CM

## 2021-09-23 DIAGNOSIS — G90.A POTS (POSTURAL ORTHOSTATIC TACHYCARDIA SYNDROME): ICD-10-CM

## 2021-09-23 DIAGNOSIS — M06.09 RHEUMATOID ARTHRITIS OF MULTIPLE SITES WITH NEGATIVE RHEUMATOID FACTOR (HCC): Chronic | ICD-10-CM

## 2021-09-23 DIAGNOSIS — M79.7 FIBROMYALGIA: ICD-10-CM

## 2021-09-23 DIAGNOSIS — G43.909 MIGRAINE WITHOUT STATUS MIGRAINOSUS, NOT INTRACTABLE, UNSPECIFIED MIGRAINE TYPE: ICD-10-CM

## 2021-09-23 DIAGNOSIS — G89.4 CHRONIC PAIN SYNDROME: ICD-10-CM

## 2021-09-23 DIAGNOSIS — G70.9 NEUROMUSCULAR DISEASE (HCC): ICD-10-CM

## 2021-09-23 DIAGNOSIS — E55.9 VITAMIN D DEFICIENCY: ICD-10-CM

## 2021-09-23 DIAGNOSIS — I10 ESSENTIAL HYPERTENSION: Primary | ICD-10-CM

## 2021-09-23 PROCEDURE — 99215 OFFICE O/P EST HI 40 MIN: CPT | Performed by: FAMILY MEDICINE

## 2021-09-23 ASSESSMENT — ENCOUNTER SYMPTOMS
SINUS PRESSURE: 0
DIARRHEA: 0
EYE ITCHING: 0
ABDOMINAL PAIN: 0
BACK PAIN: 0
APNEA: 0
COUGH: 0
NAUSEA: 0
CONSTIPATION: 0
COLOR CHANGE: 0
RHINORRHEA: 0
VOMITING: 0
WHEEZING: 0
SORE THROAT: 0
CHEST TIGHTNESS: 0
ORTHOPNEA: 0
EYE REDNESS: 0
EYE PAIN: 0
BLOOD IN STOOL: 0
SHORTNESS OF BREATH: 0

## 2021-09-23 NOTE — PROGRESS NOTES
Chief Complaint:     Chief Complaint   Patient presents with    Referral - General     Physical therapy in home.  Generalized Body Aches    Fatigue    Mental Health Problem    Hypertension         Generalized Body Aches  This is a chronic problem. The current episode started more than 1 year ago. The problem occurs daily. The problem has been waxing and waning. Associated symptoms include arthralgias, fatigue, myalgias and weakness. Pertinent negatives include no abdominal pain, chest pain, congestion, coughing, diaphoresis, fever, headaches, nausea, neck pain, numbness, rash, sore throat or vomiting. Nothing aggravates the symptoms. She has tried nothing for the symptoms. The treatment provided no relief. Fatigue  This is a chronic problem. The current episode started more than 1 month ago. The problem occurs daily. The problem has been waxing and waning. Associated symptoms include arthralgias, fatigue, myalgias and weakness. Pertinent negatives include no abdominal pain, chest pain, congestion, coughing, diaphoresis, fever, headaches, nausea, neck pain, numbness, rash, sore throat or vomiting. Nothing aggravates the symptoms. She has tried nothing for the symptoms. The treatment provided no relief. Mental Health Problem  The primary symptoms do not include dysphoric mood. Additional symptoms of the illness include fatigue. Additional symptoms of the illness do not include appetite change, headaches or abdominal pain. Hypertension  This is a chronic problem. The current episode started more than 1 month ago. The problem is unchanged. The problem is controlled. Associated symptoms include malaise/fatigue. Pertinent negatives include no chest pain, headaches, neck pain, orthopnea, palpitations, peripheral edema, PND or shortness of breath. There are no associated agents to hypertension. There are no known risk factors for coronary artery disease. Past treatments include beta blockers.  The current treatment provides significant improvement. There is no history of CAD/MI, CVA or PVD. There is no history of a hypertension causing med, pheochromocytoma, renovascular disease, sleep apnea or a thyroid problem.        Patient Active Problem List   Diagnosis    Agoraphobia with panic attacks    Fibromyalgia    Lumbar disc disorder    GERD (gastroesophageal reflux disease)    Rheumatoid arthritis ([address]    MDD (major depressive disorder), recurrent severe, without psychosis ([address]    Abnormality of gait and mobility    Migraine without status migrainosus, not intractable    Essential hypertension    Vitamin D deficiency    Irritable bowel syndrome with constipation    POTS (postural orthostatic tachycardia syndrome)    Malabsorption syndrome    Chronic pain syndrome    Chronic bilateral low back pain without sciatica    Lumbosacral spondylosis without myelopathy    Mild intermittent asthma without complication    Cigarette nicotine dependence without complication    Myalgia       Past Medical History:   Diagnosis Date    Anxiety     Arthritis     Asthma exacerbation with COPD (chronic obstructive pulmonary disease) (HCC)     Essential hypertension     Fibromyalgia     GERD (gastroesophageal reflux disease)     Major depression     Malabsorption syndrome     POTS (postural orthostatic tachycardia syndrome)     PTSD (post-traumatic stress disorder)     Rheumatoid arthritis ([address]     Seizures ([address] 2005    due to Ultram     Sx of RLS (restless legs syndrome)        Past Surgical History:   Procedure Laterality Date    ABDOMEN SURGERY  3/5/12    endometreosis    ANESTHESIA NERVE BLOCK Bilateral 6/16/2020    BILATERAL L3 L4 L5 MEDIAL NERVE BRANCH BLOCK   #1 performed by Margarita Love DO at [address] [place] [address] Bilateral 7/14/2020    BILATERAL L3 4 5 MEDIAL NERVE BRANCH BLOCK # 2 performed by Margarita Love DO at [address] [place] [address]  11/01/2016    BRONCHOSCOPY  12/14/2016    CHOLECYSTECTOMY  02/25/2016    lap    COLONOSCOPY      ENDOMETRIAL ABLATION      ENDOSCOPY, COLON, DIAGNOSTIC      HYSTERECTOMY      PAIN MANAGEMENT PROCEDURE Left 7/28/2020    LEFT L3 4 5 MEDIAL NERVE BRANCH RADIOFREQUENCY ABLATION performed by Destiney Dumont DO at [place] [address] Right 8/27/2020    RIGHT L3 4 5 MEDIAL NERVE BRANCH RADIOFREQUENCY ABLATION performed by Destiney Dumont DO at [place] [address] Right 5/18/2021    RIGHT L3, 4, 5  RADIOFREQUENCY ABLATION performed by Destiney Dumont DO at [place] [address] Left 6/10/2021    LEFT L3, 4, 5  RADIOFREQUENCY ABLATION performed by Destiney Dumont DO at [address] [place] [address]      related to endometriosis    MARCELINO AND BSO      TONSILLECTOMY         Current Outpatient Medications   Medication Sig Dispense Refill    Handicap Placard MISC by Does not apply route Patient cannot walk 200 ft without stopping to rest.    Expiration 5 yrs 1 each 0    montelukast (SINGULAIR) 10 MG tablet Take 1 tablet by mouth daily 30 tablet 3    nicotine (NICOTROL) 10 MG inhaler Inhale 1 puff into the lungs as needed for Smoking cessation 1 Inhaler 3    sodium chloride POWD powder Take 1 g by mouth 3 times daily (with meals)      Elastic Bandages & Supports (ABDOMINAL BINDER/ELASTIC LARGE) MISC 1 Device by Does not apply route daily 1 each 0    propranolol (INDERAL) 20 MG tablet Take 1 tablet by mouth 3 times daily (Patient taking differently: Take 60 mg by mouth daily ) 90 tablet 3    cetirizine (ZYRTEC) 10 MG tablet TAKE 1 TABLET BY MOUTH DAILY 30 tablet 5    Handicap Placard MISC by Does not apply route Patient cannot walk 200 ft without stopping to rest.    Expiration 5 YRS 1 each 0    cimetidine (TAGAMET) 300 MG tablet Take 300 mg by mouth 2 times daily       Acetaminophen (TYLENOL PO) Take by mouth      lidocaine 4 % external patch Place 1 patch onto the skin daily      albuterol sulfate HFA (VENTOLIN HFA) 108 (90 Base) MCG/ACT inhaler Inhale 1 puff into the lungs every 4 hours as needed for Wheezing 1 Inhaler 2    folic acid (FOLVITE) 1 MG tablet Take 1 tablet by mouth daily 30 tablet 5    medical marijuana Take by mouth as needed.  EMGALITY 120 MG/ML SOSY every 30 days       VYVANSE 60 MG CAPS Take 60 mg by mouth daily.  dexlansoprazole (DEXILANT) 60 MG CPDR delayed release capsule Take 1 capsule by mouth daily 30 capsule 1    estradiol (ESTRACE) 0.5 MG tablet Take 0.5 mg by mouth daily      baclofen (LIORESAL) 20 MG tablet Take 20 mg by mouth 3 times daily      PAROXETINE HCL PO Take 15 mg by mouth nightly       LORazepam (ATIVAN) 1 MG tablet Take 1 mg by mouth 2 times daily  .  Probiotic Product (PROBIOTIC DAILY PO) Take 2 capsules by mouth every morning        No current facility-administered medications for this visit. Allergies   Allergen Reactions    Codeine Hives    Demerol Hives    Morphine Hives     pt states that she has tolerated Dilaudid in the past w/o reaction (5/4/11)    Topiramate      Other reaction(s): Other: See Comments  heart palpitations    Tramadol Other (See Comments)     Other reaction(s):  Other: See Comments  also seizure     Casein Nausea And Vomiting    Eggs Or Egg-Derived Products Nausea And Vomiting    Gluten Meal Nausea And Vomiting    Nsaids      Other reaction(s): GI Upset  H/o ulcers    Peanuts [Peanut Oil] Nausea And Vomiting    Prochlorperazine Edisylate Nausea And Vomiting    Trazodone And Nefazodone Other (See Comments)     Nasal sinus swelling    Whey Nausea And Vomiting       Social History     Socioeconomic History    Marital status:      Spouse name: None    Number of children: None    Years of education: None    Highest education level: None   Occupational History    None   Tobacco Use    Smoking status: Current Some Day Smoker     Packs/day: 0.25     Years: 15.00 Pack years: 3.75     Types: Cigarettes     Start date: 10/28/2004    Smokeless tobacco: Never Used    Tobacco comment: smokes 5-10 cigs on stressful days then can go week without   Vaping Use    Vaping Use: Never used   Substance and Sexual Activity    Alcohol use: No    Drug use: Yes     Types: Marijuana     Comment: medical - transdermal patch - edibles    Sexual activity: None   Other Topics Concern    None   Social History Narrative    None     Social Determinants of Health     Financial Resource Strain:     Difficulty of Paying Living Expenses:    Food Insecurity:     Worried About Running Out of Food in the Last Year:     Ran Out of Food in the Last Year:    Transportation Needs:     Lack of Transportation (Medical):  Lack of Transportation (Non-Medical):    Physical Activity:     Days of Exercise per Week:     Minutes of Exercise per Session:    Stress:     Feeling of Stress :    Social Connections:     Frequency of Communication with Friends and Family:     Frequency of Social Gatherings with Friends and Family:     Attends Nondenominational Services:     Active Member of Clubs or Organizations:     Attends Club or Organization Meetings:     Marital Status:    Intimate Partner Violence:     Fear of Current or Ex-Partner:     Emotionally Abused:     Physically Abused:     Sexually Abused:        Family History   Problem Relation Age of Onset    High Blood Pressure Father     High Cholesterol Father     High Blood Pressure Mother     No Known Problems Sister           Review of Systems   Constitutional: Positive for fatigue and malaise/fatigue. Negative for activity change, appetite change, diaphoresis and fever. HENT: Negative for congestion, ear pain, hearing loss, nosebleeds, rhinorrhea, sinus pressure and sore throat. Eyes: Negative for pain, redness, itching and visual disturbance. Respiratory: Negative for apnea, cough, chest tightness, shortness of breath and wheezing. Cardiovascular: Negative for chest pain, palpitations, orthopnea, leg swelling and PND. Gastrointestinal: Negative for abdominal pain, blood in stool, constipation, diarrhea, nausea and vomiting. Endocrine: Negative. Genitourinary: Negative for decreased urine volume, difficulty urinating, dysuria, frequency, hematuria and urgency. Musculoskeletal: Positive for arthralgias and myalgias. Negative for back pain, gait problem and neck pain. Skin: Negative for color change and rash. Allergic/Immunologic: Negative for environmental allergies and food allergies. Neurological: Positive for weakness. Negative for dizziness, light-headedness, numbness and headaches. Hematological: Negative for adenopathy. Does not bruise/bleed easily. Psychiatric/Behavioral: Negative for behavioral problems, dysphoric mood and sleep disturbance. The patient is not nervous/anxious and is not hyperactive. All other systems reviewed and are negative. /72   Pulse 90   Temp 97.8 °F (36.6 °C)   Wt 198 lb (89.8 kg)   SpO2 98%   BMI 32.95 kg/m²     Physical Exam  Vitals and nursing note reviewed. Constitutional:       General: She is not in acute distress. Appearance: Normal appearance. She is well-developed. HENT:      Head: Normocephalic and atraumatic. Right Ear: Hearing, tympanic membrane and external ear normal. No tenderness. No middle ear effusion. Left Ear: Hearing, tympanic membrane and external ear normal. No tenderness. No middle ear effusion. Nose: Nose normal. No congestion or rhinorrhea. Right Turbinates: Not enlarged. Left Turbinates: Not enlarged. Mouth/Throat:      Mouth: Mucous membranes are moist.      Tongue: No lesions. Pharynx: Oropharynx is clear. No oropharyngeal exudate or posterior oropharyngeal erythema. Eyes:      General: No scleral icterus.      Conjunctiva/sclera: Conjunctivae normal.      Pupils: Pupils are equal, round, and reactive to light. Neck:      Thyroid: No thyromegaly. Cardiovascular:      Rate and Rhythm: Normal rate and regular rhythm. Heart sounds: Normal heart sounds. No murmur heard. Pulmonary:      Effort: Pulmonary effort is normal. No respiratory distress. Breath sounds: Normal breath sounds. No wheezing or rales. Abdominal:      General: Bowel sounds are normal. There is no distension. Palpations: Abdomen is soft. Tenderness: There is no abdominal tenderness. Musculoskeletal:         General: No tenderness. Normal range of motion. Cervical back: Normal range of motion and neck supple. No rigidity. No muscular tenderness. Lymphadenopathy:      Cervical: No cervical adenopathy. Skin:     General: Skin is warm and dry. Findings: No erythema or rash. Neurological:      General: No focal deficit present. Mental Status: She is alert and oriented to person, place, and time. Cranial Nerves: No cranial nerve deficit. Deep Tendon Reflexes: Reflexes are normal and symmetric.  Reflexes normal.   Psychiatric:         Mood and Affect: Mood normal.                                 ASSESSMENT/PLAN:    Patient Active Problem List   Diagnosis    Agoraphobia with panic attacks    Fibromyalgia    Lumbar disc disorder    GERD (gastroesophageal reflux disease)    Rheumatoid arthritis ([address]    MDD (major depressive disorder), recurrent severe, without psychosis ([address]    Abnormality of gait and mobility    Migraine without status migrainosus, not intractable    Essential hypertension    Vitamin D deficiency    Irritable bowel syndrome with constipation    POTS (postural orthostatic tachycardia syndrome)    Malabsorption syndrome    Chronic pain syndrome    Chronic bilateral low back pain without sciatica    Lumbosacral spondylosis without myelopathy    Mild intermittent asthma without complication    Cigarette nicotine dependence without complication    Myalgia Mandi was seen today for referral - general, generalized body aches, fatigue, mental health problem and hypertension. Diagnoses and all orders for this visit:    Essential hypertension    POTS (postural orthostatic tachycardia syndrome)    Rheumatoid arthritis of multiple sites with negative rheumatoid factor ([facility]  -     External Referral To Physical Therapy  -     DME Order for (Specify) as OP    Lumbosacral spondylosis without myelopathy  -     External Referral To Physical Therapy  -     DME Order for (Specify) as OP    Fibromyalgia  -     External Referral To Physical Therapy  -     DME Order for (Specify) as OP    Abnormality of gait and mobility  -     External Referral To Physical Therapy  -     DME Order for (Specify) as OP    Migraine without status migrainosus, not intractable, unspecified migraine type    Vitamin D deficiency    Chronic pain syndrome  -     External Referral To Physical Therapy    MDD (major depressive disorder), recurrent severe, without psychosis ([facility]    Agoraphobia with panic attacks    Neuromuscular disease ([facility]  -     External Referral To Genetics    Other orders  -     Handicap Placard MISC; by Does not apply route Patient cannot walk 200 ft without stopping to rest.    Expiration 5 yrs          Return in about 6 months (around 3/23/2022) for Recheck labs, Recheck Meds. I spent 45 minutes with this patient. I spent greater than 50% of the time counseling this patient.         Esperanza Gil,   9/23/2021  3:09 PM

## 2021-09-27 ENCOUNTER — TELEPHONE (OUTPATIENT)
Dept: PRIMARY CARE CLINIC | Age: 46
End: 2021-09-27

## 2021-09-27 DIAGNOSIS — M62.50 MUSCULAR ATROPHY, UNSPECIFIED SITE: ICD-10-CM

## 2021-09-27 DIAGNOSIS — R41.89 COGNITIVE IMPAIRMENT: ICD-10-CM

## 2021-09-27 DIAGNOSIS — R26.9 ABNORMALITY OF GAIT AND MOBILITY: ICD-10-CM

## 2021-09-27 DIAGNOSIS — G90.A POTS (POSTURAL ORTHOSTATIC TACHYCARDIA SYNDROME): Primary | ICD-10-CM

## 2021-09-27 NOTE — TELEPHONE ENCOUNTER
brenda from [address] [place] [address] called and they need a more specific diagnosis for the referral, the neuromuscular disease doesn't give them much info, also need progress notes. Please add new referral with new diagnosis.  Fax: [phone]

## 2021-09-28 NOTE — TELEPHONE ENCOUNTER
Please call patient to see what the genetic condition is called that she would like to be tested for.  [facility] is requiring additional info before they schedule her

## 2021-09-28 NOTE — TELEPHONE ENCOUNTER
Spoke with pt, having atrophy around body, muscle twitching/jerking, brain fog, balance worse, more pain. Thought she would go an talk to the genetic counselor and they would decide what to test for.

## 2021-11-02 RX ORDER — CETIRIZINE HYDROCHLORIDE 10 MG/1
10 TABLET ORAL DAILY
Qty: 30 TABLET | Refills: 5 | Status: SHIPPED
Start: 2021-11-02 | End: 2022-06-08 | Stop reason: SDUPTHER

## 2021-11-02 RX ORDER — MONTELUKAST SODIUM 10 MG/1
10 TABLET ORAL DAILY
Qty: 30 TABLET | Refills: 3 | Status: SHIPPED
Start: 2021-11-02 | End: 2022-04-12 | Stop reason: SDUPTHER

## 2022-02-01 ENCOUNTER — TELEPHONE (OUTPATIENT)
Dept: NON INVASIVE DIAGNOSTICS | Age: 47
End: 2022-02-01

## 2022-03-24 ENCOUNTER — OFFICE VISIT (OUTPATIENT)
Dept: PRIMARY CARE CLINIC | Age: 47
End: 2022-03-24
Payer: MEDICAID

## 2022-03-24 VITALS
TEMPERATURE: 97 F | DIASTOLIC BLOOD PRESSURE: 70 MMHG | OXYGEN SATURATION: 97 % | SYSTOLIC BLOOD PRESSURE: 122 MMHG | BODY MASS INDEX: 31.82 KG/M2 | WEIGHT: 191.2 LBS | HEART RATE: 94 BPM

## 2022-03-24 DIAGNOSIS — J45.20 MILD INTERMITTENT ASTHMA WITHOUT COMPLICATION: ICD-10-CM

## 2022-03-24 DIAGNOSIS — E55.9 VITAMIN D DEFICIENCY: ICD-10-CM

## 2022-03-24 DIAGNOSIS — I10 ESSENTIAL HYPERTENSION: ICD-10-CM

## 2022-03-24 DIAGNOSIS — Z00.01 ENCOUNTER FOR WELL ADULT EXAM WITH ABNORMAL FINDINGS: Primary | ICD-10-CM

## 2022-03-24 DIAGNOSIS — Z91.010 PEANUT ALLERGY: ICD-10-CM

## 2022-03-24 DIAGNOSIS — G70.9 NEUROMUSCULAR DISEASE (HCC): ICD-10-CM

## 2022-03-24 DIAGNOSIS — M06.09 RHEUMATOID ARTHRITIS OF MULTIPLE SITES WITH NEGATIVE RHEUMATOID FACTOR (HCC): ICD-10-CM

## 2022-03-24 DIAGNOSIS — Q79.60 EHLERS-DANLOS SYNDROME: ICD-10-CM

## 2022-03-24 DIAGNOSIS — M81.8 OTHER OSTEOPOROSIS WITHOUT CURRENT PATHOLOGICAL FRACTURE: ICD-10-CM

## 2022-03-24 DIAGNOSIS — K21.9 GASTROESOPHAGEAL REFLUX DISEASE, UNSPECIFIED WHETHER ESOPHAGITIS PRESENT: Chronic | ICD-10-CM

## 2022-03-24 DIAGNOSIS — F33.2 MDD (MAJOR DEPRESSIVE DISORDER), RECURRENT SEVERE, WITHOUT PSYCHOSIS (HCC): ICD-10-CM

## 2022-03-24 DIAGNOSIS — I42.2 HYPERTROPHIC CARDIOMYOPATHY (HCC): ICD-10-CM

## 2022-03-24 DIAGNOSIS — Z12.31 SCREENING MAMMOGRAM FOR BREAST CANCER: ICD-10-CM

## 2022-03-24 LAB
ALBUMIN SERPL-MCNC: 4.5 G/DL (ref 3.5–5.2)
ALP BLD-CCNC: 68 U/L (ref 35–104)
ALT SERPL-CCNC: 14 U/L (ref 0–32)
ANION GAP SERPL CALCULATED.3IONS-SCNC: 16 MMOL/L (ref 7–16)
AST SERPL-CCNC: 20 U/L (ref 0–31)
BILIRUB SERPL-MCNC: 0.2 MG/DL (ref 0–1.2)
BUN BLDV-MCNC: 12 MG/DL (ref 6–20)
CALCIUM SERPL-MCNC: 9.7 MG/DL (ref 8.6–10.2)
CHLORIDE BLD-SCNC: 101 MMOL/L (ref 98–107)
CHOLESTEROL, TOTAL: 202 MG/DL (ref 0–199)
CO2: 21 MMOL/L (ref 22–29)
CREAT SERPL-MCNC: 0.9 MG/DL (ref 0.5–1)
GFR AFRICAN AMERICAN: >60
GFR NON-AFRICAN AMERICAN: >60 ML/MIN/1.73
GLUCOSE BLD-MCNC: 70 MG/DL (ref 74–99)
HCT VFR BLD CALC: 45.9 % (ref 34–48)
HDLC SERPL-MCNC: 53 MG/DL
HEMOGLOBIN: 14.6 G/DL (ref 11.5–15.5)
LDL CHOLESTEROL CALCULATED: 124 MG/DL (ref 0–99)
MCH RBC QN AUTO: 30.7 PG (ref 26–35)
MCHC RBC AUTO-ENTMCNC: 31.8 % (ref 32–34.5)
MCV RBC AUTO: 96.6 FL (ref 80–99.9)
PDW BLD-RTO: 14.3 FL (ref 11.5–15)
PLATELET # BLD: 302 E9/L (ref 130–450)
PMV BLD AUTO: 10.6 FL (ref 7–12)
POTASSIUM SERPL-SCNC: 4.2 MMOL/L (ref 3.5–5)
RBC # BLD: 4.75 E12/L (ref 3.5–5.5)
SODIUM BLD-SCNC: 138 MMOL/L (ref 132–146)
TOTAL PROTEIN: 7.6 G/DL (ref 6.4–8.3)
TRIGL SERPL-MCNC: 127 MG/DL (ref 0–149)
TSH SERPL DL<=0.05 MIU/L-ACNC: 1.29 UIU/ML (ref 0.27–4.2)
VITAMIN D 25-HYDROXY: 12 NG/ML (ref 30–100)
VLDLC SERPL CALC-MCNC: 25 MG/DL
WBC # BLD: 9.5 E9/L (ref 4.5–11.5)

## 2022-03-24 PROCEDURE — 99396 PREV VISIT EST AGE 40-64: CPT | Performed by: FAMILY MEDICINE

## 2022-03-24 RX ORDER — EPINEPHRINE 0.3 MG/.3ML
0.3 INJECTION SUBCUTANEOUS ONCE
Qty: 2 EACH | Refills: 1 | Status: SHIPPED | OUTPATIENT
Start: 2022-03-24 | End: 2022-11-03

## 2022-03-24 SDOH — ECONOMIC STABILITY: FOOD INSECURITY: WITHIN THE PAST 12 MONTHS, YOU WORRIED THAT YOUR FOOD WOULD RUN OUT BEFORE YOU GOT MONEY TO BUY MORE.: NEVER TRUE

## 2022-03-24 SDOH — ECONOMIC STABILITY: FOOD INSECURITY: WITHIN THE PAST 12 MONTHS, THE FOOD YOU BOUGHT JUST DIDN'T LAST AND YOU DIDN'T HAVE MONEY TO GET MORE.: NEVER TRUE

## 2022-03-24 ASSESSMENT — PATIENT HEALTH QUESTIONNAIRE - PHQ9
5. POOR APPETITE OR OVEREATING: 0
2. FEELING DOWN, DEPRESSED OR HOPELESS: 0
SUM OF ALL RESPONSES TO PHQ QUESTIONS 1-9: 0
9. THOUGHTS THAT YOU WOULD BE BETTER OFF DEAD, OR OF HURTING YOURSELF: 0
6. FEELING BAD ABOUT YOURSELF - OR THAT YOU ARE A FAILURE OR HAVE LET YOURSELF OR YOUR FAMILY DOWN: 0
3. TROUBLE FALLING OR STAYING ASLEEP: 0
SUM OF ALL RESPONSES TO PHQ9 QUESTIONS 1 & 2: 0
7. TROUBLE CONCENTRATING ON THINGS, SUCH AS READING THE NEWSPAPER OR WATCHING TELEVISION: 0
4. FEELING TIRED OR HAVING LITTLE ENERGY: 0
SUM OF ALL RESPONSES TO PHQ QUESTIONS 1-9: 0
SUM OF ALL RESPONSES TO PHQ QUESTIONS 1-9: 0
8. MOVING OR SPEAKING SO SLOWLY THAT OTHER PEOPLE COULD HAVE NOTICED. OR THE OPPOSITE, BEING SO FIGETY OR RESTLESS THAT YOU HAVE BEEN MOVING AROUND A LOT MORE THAN USUAL: 0
1. LITTLE INTEREST OR PLEASURE IN DOING THINGS: 0
SUM OF ALL RESPONSES TO PHQ QUESTIONS 1-9: 0

## 2022-03-24 ASSESSMENT — ENCOUNTER SYMPTOMS
COLOR CHANGE: 0
COUGH: 0
EYE ITCHING: 0
CONSTIPATION: 0
NAUSEA: 0
BLOOD IN STOOL: 0
APNEA: 0
CHEST TIGHTNESS: 0
WHEEZING: 0
EYE REDNESS: 0
ABDOMINAL PAIN: 0
EYE PAIN: 0
DIARRHEA: 0
SINUS PRESSURE: 0
BACK PAIN: 0
SORE THROAT: 0
SHORTNESS OF BREATH: 0
RHINORRHEA: 0
VOMITING: 0

## 2022-03-24 ASSESSMENT — SOCIAL DETERMINANTS OF HEALTH (SDOH): HOW HARD IS IT FOR YOU TO PAY FOR THE VERY BASICS LIKE FOOD, HOUSING, MEDICAL CARE, AND HEATING?: NOT HARD AT ALL

## 2022-03-24 NOTE — PROGRESS NOTES
Well Adult Note  Name: Tinnie Kayser Date: 3/24/2022   MRN: [id number] Sex: Female   Age: 42 y.o. Ethnicity: Non- / Non    : 1975 Race: White (non-)      Debra Lafleur is here for well adult exam.  History:  Neuromuscular condition, RA, GERD, Anxiety, HTN      Review of Systems   Constitutional: Negative for activity change, appetite change, fatigue and fever. HENT: Negative for congestion, ear pain, hearing loss, nosebleeds, rhinorrhea, sinus pressure and sore throat. Eyes: Negative for pain, redness, itching and visual disturbance. Respiratory: Negative for apnea, cough, chest tightness, shortness of breath and wheezing. Cardiovascular: Negative for chest pain, palpitations and leg swelling. Gastrointestinal: Negative for abdominal pain, blood in stool, constipation, diarrhea, nausea and vomiting. Endocrine: Negative. Genitourinary: Negative for decreased urine volume, difficulty urinating, dysuria, frequency, hematuria and urgency. Musculoskeletal: Negative for arthralgias, back pain, gait problem, myalgias and neck pain. Skin: Negative for color change and rash. Allergic/Immunologic: Negative for environmental allergies and food allergies. Neurological: Negative for dizziness, weakness, light-headedness, numbness and headaches. Hematological: Negative for adenopathy. Does not bruise/bleed easily. Psychiatric/Behavioral: Negative for behavioral problems, dysphoric mood and sleep disturbance. The patient is not nervous/anxious and is not hyperactive. All other systems reviewed and are negative. Allergies   Allergen Reactions    Codeine Hives    Demerol Hives    Morphine Hives     pt states that she has tolerated Dilaudid in the past w/o reaction (11)    Topiramate      Other reaction(s): Other: See Comments  heart palpitations    Tramadol Other (See Comments)     Other reaction(s):  Other: See Comments  also seizure     Casein Nausea And Vomiting    Eggs Or Egg-Derived Products Nausea And Vomiting    Gluten Meal Nausea And Vomiting    Nsaids      Other reaction(s): GI Upset  H/o ulcers    Peanuts [Peanut Oil] Nausea And Vomiting    Prochlorperazine Edisylate Nausea And Vomiting    Trazodone And Nefazodone Other (See Comments)     Nasal sinus swelling    Whey Nausea And Vomiting         Prior to Visit Medications    Medication Sig Taking?  Authorizing Provider   EPINEPHrine (EPIPEN 2-ZAINAB) 0.3 MG/0.3ML SOAJ injection Inject 0.3 mLs into the muscle once for 1 dose Use as directed for allergic reaction Yes Chivo William,    propranolol (INDERAL) 20 MG tablet Take 1 tablet by mouth 3 times daily  Patient taking differently: Take 80 mg by mouth daily  Yes Jaime Rosales MD   cetirizine (ZYRTEC) 10 MG tablet Take 1 tablet by mouth daily  Jignesh Marcano DO   montelukast (SINGULAIR) 10 MG tablet Take 1 tablet by mouth daily  Jignesh Rgi, DO   Handicap Placard MISC by Does not apply route Patient cannot walk 200 ft without stopping to rest.    Expiration 5 yrs  Chivo William DO   nicotine (NICOTROL) 10 MG inhaler Inhale 1 puff into the lungs as needed for Smoking cessation  Jignesh Marcano, DO   sodium chloride POWD powder Take 1 g by mouth 3 times daily (with meals)  Historical Provider, MD   Elastic Bandages & Supports (ABDOMINAL BINDER/ELASTIC LARGE) MISC 1 Device by Does not apply route daily  Jaime Rosales MD   Handicap Placard MISC by Does not apply route Patient cannot walk 200 ft without stopping to rest.    Expiration 5 YRS  Jignesh Marcano, DO   cimetidine (TAGAMET) 300 MG tablet Take 300 mg by mouth 2 times daily   Historical Provider, MD   Acetaminophen (TYLENOL PO) Take by mouth  Historical Provider, MD   lidocaine 4 % external patch Place 1 patch onto the skin daily  Historical Provider, MD   albuterol sulfate HFA (VENTOLIN HFA) 108 (90 Base) MCG/ACT inhaler Inhale 1 puff into the lungs every 4 hours as needed for Wheezing  Jignesh Marcano DO   folic acid (FOLVITE) 1 MG tablet Take 1 tablet by mouth daily  Kali Biswas DO   medical marijuana Take by mouth as needed. Historical Provider, MD   EMGALITY 120 MG/ML SOSY every 30 days   Historical Provider, MD   VYVANSE 60 MG CAPS Take 60 mg by mouth daily. Historical Provider, MD   dexlansoprazole (DEXILANT) 60 MG CPDR delayed release capsule Take 1 capsule by mouth daily  Edda Posey DO   estradiol (ESTRACE) 0.5 MG tablet Take 0.5 mg by mouth daily  Historical Provider, MD   baclofen (LIORESAL) 20 MG tablet Take 20 mg by mouth 3 times daily  Historical Provider, MD   PAROXETINE HCL PO Take 15 mg by mouth nightly   Historical Provider, MD   LORazepam (ATIVAN) 1 MG tablet Take 1 mg by mouth 2 times daily  .   Historical Provider, MD   Probiotic Product (PROBIOTIC DAILY PO) Take 2 capsules by mouth every morning   Historical Provider, MD         Past Medical History:   Diagnosis Date    Anxiety     Arthritis     Asthma exacerbation with COPD (chronic obstructive pulmonary disease) ([facility]     Essential hypertension     Fibromyalgia     GERD (gastroesophageal reflux disease)     Major depression     Malabsorption syndrome     POTS (postural orthostatic tachycardia syndrome)     PTSD (post-traumatic stress disorder)     Rheumatoid arthritis ([facility]     Seizures ([facility] 2005    due to Ultram     Sx of RLS (restless legs syndrome)        Past Surgical History:   Procedure Laterality Date    ABDOMEN SURGERY  3/5/12    endometreosis    ANESTHESIA NERVE BLOCK Bilateral 6/16/2020    BILATERAL L3 L4 L5 MEDIAL NERVE BRANCH BLOCK   #1 performed by Sandra Hastings DO at [address] [place] [address] Bilateral 7/14/2020    BILATERAL L3 4 5 MEDIAL NERVE BRANCH BLOCK # 2 performed by Sandra Hastings DO at [address] [place] [address]  11/01/2016    BRONCHOSCOPY  12/14/2016    CHOLECYSTECTOMY  02/25/2016    lap    COLONOSCOPY      ENDOMETRIAL ABLATION      ENDOSCOPY, COLON, DIAGNOSTIC      HYSTERECTOMY      PAIN MANAGEMENT PROCEDURE Left 7/28/2020    LEFT L3 4 5 MEDIAL NERVE BRANCH RADIOFREQUENCY ABLATION performed by Emily Davis DO at [facility] [address] Right 8/27/2020    RIGHT L3 4 5 MEDIAL NERVE BRANCH RADIOFREQUENCY ABLATION performed by Emily Davis DO at [facility] [address] Right 5/18/2021    RIGHT L3, 4, 5  RADIOFREQUENCY ABLATION performed by Emily Davis DO at [facility] [address] Left 6/10/2021    LEFT L3, 4, 5  RADIOFREQUENCY ABLATION performed by Emily Davis DO at [address]      related to endometriosis    MARCELINO AND BSO      TONSILLECTOMY           Family History   Problem Relation Age of Onset    High Blood Pressure Father     High Cholesterol Father     High Blood Pressure Mother     No Known Problems Sister        Social History     Tobacco Use    Smoking status: Current Some Day Smoker     Packs/day: 0.25     Years: 15.00     Pack years: 3.75     Types: Cigarettes     Start date: 10/28/2004    Smokeless tobacco: Never Used    Tobacco comment: smokes 5-10 cigs on stressful days then can go week without   Vaping Use    Vaping Use: Never used   Substance Use Topics    Alcohol use: No    Drug use: Yes     Types: Marijuana Olivier Freeman)     Comment: medical - transdermal patch - edibles       Objective   /70 (Site: Left Upper Arm, Position: Sitting)   Pulse 94   Temp 97 °F (36.1 °C) (Temporal)   Wt 191 lb 3.2 oz (86.7 kg)   SpO2 97%   BMI 31.82 kg/m²   Wt Readings from Last 3 Encounters:   03/24/22 191 lb 3.2 oz (86.7 kg)   09/23/21 198 lb (89.8 kg)   08/04/21 180 lb (81.6 kg)     There were no vitals filed for this visit. Physical Exam  Vitals and nursing note reviewed. Constitutional:       General: She is not in acute distress. Appearance: Normal appearance. She is well-developed.    HENT:      Head: Normocephalic and atraumatic. Right Ear: Hearing, tympanic membrane and external ear normal. No tenderness. No middle ear effusion. Left Ear: Hearing, tympanic membrane and external ear normal. No tenderness. No middle ear effusion. Nose: Nose normal. No congestion or rhinorrhea. Right Turbinates: Not enlarged. Left Turbinates: Not enlarged. Mouth/Throat:      Mouth: Mucous membranes are moist.      Tongue: No lesions. Pharynx: Oropharynx is clear. No oropharyngeal exudate or posterior oropharyngeal erythema. Eyes:      General: No scleral icterus. Conjunctiva/sclera: Conjunctivae normal.      Pupils: Pupils are equal, round, and reactive to light. Neck:      Thyroid: No thyromegaly. Cardiovascular:      Rate and Rhythm: Normal rate and regular rhythm. Heart sounds: Normal heart sounds. No murmur heard. Pulmonary:      Effort: Pulmonary effort is normal. No respiratory distress. Breath sounds: Normal breath sounds. No wheezing or rales. Abdominal:      General: Bowel sounds are normal. There is no distension. Palpations: Abdomen is soft. Tenderness: There is no abdominal tenderness. Musculoskeletal:         General: No tenderness. Normal range of motion. Cervical back: Normal range of motion and neck supple. No rigidity. No muscular tenderness. Lymphadenopathy:      Cervical: No cervical adenopathy. Skin:     General: Skin is warm and dry. Findings: No erythema or rash. Neurological:      General: No focal deficit present. Mental Status: She is alert and oriented to person, place, and time. Cranial Nerves: No cranial nerve deficit. Deep Tendon Reflexes: Reflexes are normal and symmetric. Reflexes normal.   Psychiatric:         Mood and Affect: Mood normal.           Assessment   Plan   1. Encounter for well adult exam with abnormal findings  2. Neuromuscular disease ([facility]  3. Christina-Danlos syndrome  4. Hypertrophic cardiomyopathy ([facility]  5. Rheumatoid arthritis of multiple sites with negative rheumatoid factor ([facility]  6. MDD (major depressive disorder), recurrent severe, without psychosis ([facility]  7. Mild intermittent asthma without complication  8. Essential hypertension  -     CBC; Future  -     Comprehensive Metabolic Panel; Future  -     Lipid Panel; Future  -     TSH; Future  9. Gastroesophageal reflux disease, unspecified whether esophagitis present  10. Peanut allergy  -     EPINEPHrine (EPIPEN 2-ZAINAB) 0.3 MG/0.3ML SOAJ injection; Inject 0.3 mLs into the muscle once for 1 dose Use as directed for allergic reaction, Disp-2 each, R-1Normal  11. Vitamin D deficiency  -     Vitamin D 25 Hydroxy; Future  12. Screening mammogram for breast cancer  -     SAMUEL DIGITAL SCREEN W OR WO CAD BILATERAL; Future  13. Other osteoporosis without current pathological fracture  -     DEXA BONE DENSITY 2 SITES;  Future         Personalized Preventive Plan   Current Health Maintenance Status  Immunization History   Administered Date(s) Administered    COVID-19, Benjamin Jackson, Primary or Immunocompromised, PF, 100mcg/0.5mL 03/25/2021, 04/22/2021, 12/23/2021    Influenza Vaccine, unspecified formulation 10/10/2011    Influenza Virus Vaccine 10/10/2011    Influenza, Quadv, IM, PF (6 mo and older Fluzone, Flulaval, Fluarix, and 3 yrs and older Afluria) 12/21/2020        Health Maintenance   Topic Date Due    Pneumococcal 0-64 years Vaccine (1 of 2 - PPSV23) Never done    Depression Monitoring  Never done    HIV screen  Never done    DTaP/Tdap/Td vaccine (1 - Tdap) Never done    Flu vaccine (1) 09/01/2021    Potassium monitoring  12/21/2021    Creatinine monitoring  12/21/2021    Lipid screen  12/21/2025    COVID-19 Vaccine  Completed    Hepatitis C screen  Completed    Hepatitis A vaccine  Aged Out    Hepatitis B vaccine  Aged Out    Hib vaccine  Aged Out    Meningococcal (ACWY) vaccine  Aged Out     Recommendations for Preventive Services Due: see orders and patient instructions/AVS.    Return in about 6 months (around 9/24/2022) for Follow up HTN, Recheck labs, Recheck Meds.

## 2022-03-25 RX ORDER — ERGOCALCIFEROL 1.25 MG/1
50000 CAPSULE ORAL WEEKLY
Qty: 12 CAPSULE | Refills: 1 | Status: SHIPPED | OUTPATIENT
Start: 2022-03-25

## 2022-04-12 RX ORDER — MONTELUKAST SODIUM 10 MG/1
10 TABLET ORAL DAILY
Qty: 30 TABLET | Refills: 3 | Status: SHIPPED
Start: 2022-04-12 | End: 2022-08-30 | Stop reason: SDUPTHER

## 2022-04-12 RX ORDER — ALBUTEROL SULFATE 90 UG/1
1 AEROSOL, METERED RESPIRATORY (INHALATION) EVERY 4 HOURS PRN
Qty: 1 EACH | Refills: 0 | Status: SHIPPED | OUTPATIENT
Start: 2022-04-12

## 2022-06-08 RX ORDER — CETIRIZINE HYDROCHLORIDE 10 MG/1
10 TABLET ORAL DAILY
Qty: 30 TABLET | Refills: 5 | Status: SHIPPED | OUTPATIENT
Start: 2022-06-08

## 2022-06-15 ENCOUNTER — PREP FOR PROCEDURE (OUTPATIENT)
Dept: PAIN MANAGEMENT | Age: 47
End: 2022-06-15

## 2022-06-15 ENCOUNTER — OFFICE VISIT (OUTPATIENT)
Dept: PAIN MANAGEMENT | Age: 47
End: 2022-06-15
Payer: MEDICAID

## 2022-06-15 VITALS
DIASTOLIC BLOOD PRESSURE: 84 MMHG | TEMPERATURE: 97.4 F | WEIGHT: 191 LBS | HEART RATE: 102 BPM | SYSTOLIC BLOOD PRESSURE: 134 MMHG | HEIGHT: 65 IN | OXYGEN SATURATION: 97 % | BODY MASS INDEX: 31.82 KG/M2

## 2022-06-15 DIAGNOSIS — M51.9 LUMBAR DISC DISORDER: ICD-10-CM

## 2022-06-15 DIAGNOSIS — M79.10 MYALGIA: ICD-10-CM

## 2022-06-15 DIAGNOSIS — M47.817 LUMBOSACRAL SPONDYLOSIS WITHOUT MYELOPATHY: ICD-10-CM

## 2022-06-15 DIAGNOSIS — M47.817 LUMBOSACRAL SPONDYLOSIS WITHOUT MYELOPATHY: Primary | ICD-10-CM

## 2022-06-15 DIAGNOSIS — G89.29 CHRONIC BILATERAL LOW BACK PAIN WITHOUT SCIATICA: ICD-10-CM

## 2022-06-15 DIAGNOSIS — M54.50 CHRONIC BILATERAL LOW BACK PAIN WITHOUT SCIATICA: ICD-10-CM

## 2022-06-15 DIAGNOSIS — G89.4 CHRONIC PAIN SYNDROME: Primary | ICD-10-CM

## 2022-06-15 PROCEDURE — 99213 OFFICE O/P EST LOW 20 MIN: CPT | Performed by: PAIN MEDICINE

## 2022-06-15 PROCEDURE — 99213 OFFICE O/P EST LOW 20 MIN: CPT

## 2022-06-15 RX ORDER — DIAZEPAM 5 MG/1
5 TABLET ORAL PRN
Qty: 2 TABLET | Refills: 0 | Status: SHIPPED
Start: 2022-06-15 | End: 2022-07-27 | Stop reason: SDUPTHER

## 2022-06-15 NOTE — PROGRESS NOTES
Do you currently have any of the following:    Fever: No  Headache:  No  Cough: No  Shortness of breath: No  Exposed to anyone with these symptoms: No         Mandi Merino presents to the [facility] on 6/15/2022. Mandi is complaining of pain in back. The pain is constant. The pain is described as aching, shooting, stabbing and sharp. Pain is rated on her best day at a 3, on her worst day at a 9, and on average at a 6 on the VAS scale. She took her last dose of medical marijuana patch today. Any procedures since your last visit: No.    Pacemaker or defibrillator: No.    She is not on NSAIDS and is not on anticoagulation medications to include none and is managed by none. Medication Contract and Consent for Opioid Use Documents Filed      No documents found                Temp 97.4 °F (36.3 °C) (Infrared)   Ht 5' 5\" (1.651 m)   Wt 191 lb (86.6 kg)   BMI 31.78 kg/m²      No LMP recorded. Patient has had a hysterectomy.

## 2022-06-15 NOTE — PROGRESS NOTES
[facility] Pain Management        [address] [facility], [facility] Chanel Bergeron  Dept: [phone]        Follow up Note      Colleen Guo     Date of Visit:  06/15/22     CC:  Patient presents for follow up   Chief Complaint   Patient presents with    Back Pain     HPI:    Pain is worse. Pain has worn off since the ablation and she would like repeat. Change in quality of symptoms:no. Medication side effects:not applicable . Recent diagnostic testing:none. Recent interventional procedures:TPI with minimal relief     She has not been on anticoagulation medications to include ASA, NSAIDS, Plavix, heparin, LMW heparin and warfarin. The patient  has not been on herbal supplements. The patient is not diabetic.     Imaging:   MRI lumbar spine 2/20/20 (per NSGY notes)  \"degenerative disc disease with herniated disc at L4-5. No significant central canal or neuroforaminal stenosis noted. \" (pt states done at [facility]       Potential Aberrant Drug-Related Behavior:      Urine Drug Screening:    OARRS report:  6/2022 - on medical [facility]    Past Medical History:   Diagnosis Date    Anxiety     Arthritis     Asthma exacerbation with COPD (chronic obstructive pulmonary disease) ([address]     Essential hypertension     Fibromyalgia     GERD (gastroesophageal reflux disease)     Major depression     Malabsorption syndrome     POTS (postural orthostatic tachycardia syndrome)     PTSD (post-traumatic stress disorder)     Rheumatoid arthritis ([address]     Seizures ([address] 2005    due to Ultram     Sx of RLS (restless legs syndrome)        Past Surgical History:   Procedure Laterality Date    ABDOMEN SURGERY  3/5/12    endometreosis    ANESTHESIA NERVE BLOCK Bilateral 6/16/2020    BILATERAL L3 L4 L5 MEDIAL NERVE BRANCH BLOCK   #1 performed by Simeon Hurtado DO at [address] [facility] Bilateral 7/14/2020    BILATERAL L3 4 5 MEDIAL NERVE BRANCH BLOCK # 2 performed by Simeon Hurtado DO [address]  11/01/2016    BRONCHOSCOPY  12/14/2016    CHOLECYSTECTOMY  02/25/2016    lap    COLONOSCOPY      ENDOMETRIAL ABLATION      ENDOSCOPY, COLON, DIAGNOSTIC      HYSTERECTOMY (CERVIX STATUS UNKNOWN)      PAIN MANAGEMENT PROCEDURE Left 7/28/2020    LEFT L3 4 5 MEDIAL NERVE BRANCH RADIOFREQUENCY ABLATION performed by Ammy [place], DO at [address] [facility] Right 8/27/2020    RIGHT L3 4 5 MEDIAL NERVE BRANCH RADIOFREQUENCY ABLATION performed by Ammy [place], DO at [address] [facility] Right 5/18/2021    RIGHT L3, 4, 5  RADIOFREQUENCY ABLATION performed by AmmyAnedot, DO at [address] [facility] Left 6/10/2021    LEFT L3, 4, 5  RADIOFREQUENCY ABLATION performed by Ammy [place], DO at [address]      related to endometriosis    MARCELINO AND BSO (CERVIX REMOVED)      TONSILLECTOMY         Prior to Admission medications    Medication Sig Start Date End Date Taking?  Authorizing Provider   cetirizine (ZYRTEC) 10 MG tablet Take 1 tablet by mouth daily 6/8/22  Yes Jignesh Rgi, DO   albuterol sulfate HFA (VENTOLIN HFA) 108 (90 Base) MCG/ACT inhaler Inhale 1 puff into the lungs every 4 hours as needed for Wheezing 4/12/22  Yes Jignesh Rgi, DO   montelukast (SINGULAIR) 10 MG tablet Take 1 tablet by mouth daily 4/12/22  Yes Jignesh Rgi, DO   vitamin D (ERGOCALCIFEROL) 1.25 MG (65263 UT) CAPS capsule Take 1 capsule by mouth once a week 3/25/22  Yes Jose Maza DO   Handicap Placard MISC by Does not apply route Patient cannot walk 200 ft without stopping to rest.    Expiration 5 yrs 9/23/21  Yes Jose Maza DO   nicotine (NICOTROL) 10 MG inhaler Inhale 1 puff into the lungs as needed for Smoking cessation 7/6/21  Yes Jose Maza DO   sodium chloride POWD powder Take 1 g by mouth 3 times daily (with meals)   Yes Historical Provider, MD   Elastic Bandages & Supports (ABDOMINAL BINDER/ELASTIC LARGE) MISC 1 Device by Does not apply route daily 5/12/21  Yes Forrest Soliman MD   propranolol (INDERAL) 20 MG tablet Take 1 tablet by mouth 3 times daily  Patient taking differently: Take 80 mg by mouth daily  5/12/21  Yes Forrest Soliman MD   Handicap Placard MISC by Does not apply route Patient cannot walk 200 ft without stopping to rest.    Expiration 5 YRS 1/7/21  Yes Sloane Sanchez, DO   cimetidine (TAGAMET) 300 MG tablet Take 300 mg by mouth 2 times daily    Yes Historical Provider, MD   Acetaminophen (TYLENOL PO) Take by mouth   Yes Historical Provider, MD   lidocaine 4 % external patch Place 1 patch onto the skin daily   Yes Historical Provider, MD   folic acid (FOLVITE) 1 MG tablet Take 1 tablet by mouth daily 7/22/20  Yes Sloane Sanchez DO   medical marijuana Take by mouth as needed. Yes Historical Provider, MD   EMGALITY 120 MG/ML SOSY every 30 days  10/1/19  Yes Historical Provider, MD   VYVANSE 60 MG CAPS Take 60 mg by mouth daily. 8/3/19  Yes Historical Provider, MD   dexlansoprazole (DEXILANT) 60 MG CPDR delayed release capsule Take 1 capsule by mouth daily 12/17/18  Yes Janna Medeiros,    estradiol (ESTRACE) 0.5 MG tablet Take 0.5 mg by mouth daily   Yes Historical Provider, MD   baclofen (LIORESAL) 20 MG tablet Take 20 mg by mouth 3 times daily   Yes Historical Provider, MD   PAROXETINE HCL PO Take 15 mg by mouth nightly    Yes Historical Provider, MD   LORazepam (ATIVAN) 1 MG tablet Take 1 mg by mouth 2 times daily  .  8/14/16  Yes Historical Provider, MD   Probiotic Product (PROBIOTIC DAILY PO) Take 2 capsules by mouth every morning    Yes Historical Provider, MD   EPINEPHrine (EPIPEN 2-ZAINAB) 0.3 MG/0.3ML SOAJ injection Inject 0.3 mLs into the muscle once for 1 dose Use as directed for allergic reaction 3/24/22 3/24/22  Jignesh Kline, DO       Allergies   Allergen Reactions    Codeine Hives    Demerol Hives    Morphine Hives     pt states that she has tolerated Dilaudid in the past w/o reaction (5/4/11)    Topiramate      Other reaction(s): Other: See Comments  heart palpitations    Tramadol Other (See Comments)     Other reaction(s): Other: See Comments  also seizure     Casein Nausea And Vomiting    Eggs Or Egg-Derived Products Nausea And Vomiting    Gluten Meal Nausea And Vomiting    Nsaids      Other reaction(s): GI Upset  H/o ulcers    Peanuts [Peanut Oil] Nausea And Vomiting    Prochlorperazine Edisylate Nausea And Vomiting    Trazodone And Nefazodone Other (See Comments)     Nasal sinus swelling    Whey Nausea And Vomiting       Social History     Socioeconomic History    Marital status:      Spouse name: Not on file    Number of children: Not on file    Years of education: Not on file    Highest education level: Not on file   Occupational History    Not on file   Tobacco Use    Smoking status: Current Some Day Smoker     Packs/day: 0.25     Years: 15.00     Pack years: 3.75     Types: Cigarettes     Start date: 10/28/2004    Smokeless tobacco: Never Used    Tobacco comment: smokes 5-10 cigs on stressful days then can go week without   Vaping Use    Vaping Use: Never used   Substance and Sexual Activity    Alcohol use: No    Drug use: Yes     Types: Marijuana Jesse November)     Comment: medical - transdermal patch - edibles    Sexual activity: Not on file   Other Topics Concern    Not on file   Social History Narrative    Not on file     Social Determinants of Health     Financial Resource Strain: Low Risk     Difficulty of Paying Living Expenses: Not hard at all   Food Insecurity: No Food Insecurity    Worried About Running Out of Food in the Last Year: Never true    Osman of Food in the Last Year: Never true   Transportation Needs:     Lack of Transportation (Medical): Not on file    Lack of Transportation (Non-Medical):  Not on file   Physical Activity:     Days of Exercise per Week: Not on file    Minutes of Exercise per Session: Not on file   Stress:     Feeling of Stress : Not on file   Social Connections:     Frequency of Communication with Friends and Family: Not on file    Frequency of Social Gatherings with Friends and Family: Not on file    Attends [employer] Services: Not on file    Active Member of [address] [place] [address] or Organizations: Not on file    Attends Club or Organization Meetings: Not on file    Marital Status: Not on file   Intimate Partner Violence:     Fear of Current or Ex-Partner: Not on file    Emotionally Abused: Not on file    Physically Abused: Not on file    Sexually Abused: Not on file   Housing Stability:     Unable to Pay for Housing in the Last Year: Not on file    Number of [place] in the Last Year: Not on file    Unstable Housing in the Last Year: Not on file       Family History   Problem Relation Age of Onset    High Blood Pressure Father     High Cholesterol Father     High Blood Pressure Mother     No Known Problems Sister        REVIEW OF SYSTEMS:     Mandi denies fever/chills, chest pain, shortness of breath, new bowel or bladder complaints. All other review of systems was negative. PHYSICAL EXAMINATION:      /84   Pulse (!) 102   Temp 97.4 °F (36.3 °C) (Infrared)   Ht 5' 5\" (1.651 m)   Wt 191 lb (86.6 kg)   SpO2 97%   BMI 31.78 kg/m²     General:      General appearance:pleasant and well-hydrated, in no distress and A & O x3  Build:Overweight  Function:Rises from a seated position with difficulty, mild    HEENT:    Head:normocephalic, atraumatic  Pupils:regular, round, equal  Sclera: icterus absent    Lungs:    Breathing:normal breathing pattern    Abdomen:    Shape:non-distended  Tenderness:none  Guarding:none    Lumbar spine:    Spine inspection:normal  CVA tenderness:No   Palpation:tenderness paravertebral muscles, left, right positive. + pain with facet loading.   Range of motion:abnormal mildly in lateral bending, flexion, extension rotation bilateral and is painful. Musculoskeletal:    Trigger points in Paraveteral:absent bilaterally  SI joint tenderness:positive right, positive left              MARGY test:not done right, not done left  Piriformis tenderness:negative right, negative left  Trochanteric bursa tenderness:negative right, negative left  SLR:negative right, negative left, sitting     Extremities:    Tremors:None bilaterally upper and lower  Range of motion:pain with internal rotation of hips negative.   Intact:Yes  Varicose veins:absent   Pulses:present Lt radial  Cyanosis:none  Edema:none x all 4 extremities    Neurological:    Sensory:normal to light touch BLE  Motor:                Right Quadriceps5/5          Left Quadriceps5/5           Right Gastrocnemius5/5    Left Gastrocnemius5/5  Right Ant Tibialis5/5  Left Ant Tibialis5/5    Reflexes:  (not assessed today)  Right Quadriceps reflex2+  Left Quadriceps reflex2+  Right Achilles reflex2+  Left Achilles reflex2+  Gait:normal station, with a quad cane    Dermatology:    Skin:no rashes or lesions noted     Impression:     LBP  Lumbar MRI shows DDD without sig central or foraminal stenosis  Referred by NSGY for procedures  Extensive PMHx - RLS, chronic migraines, seizures, rheumatoid arthritis, POTS, GERD, fibromyalgia, HTN, CHANNING, colitis, and a neuromuscular disorder  Hx SCS TRIAL for endometriosis which was complicated by a MRSA infection tx with PICC line abx  Has tried baclofen, lidocaine patches, and medical THC with moderate relief    RFA's were significantly helpful, have now worn off, she would like to repeat      Plan:    Urine screen deferred  OARRS report reviewed  failed diclofenac gel due to GI upset  Right L3,4,5 RFA with 60% relief, left with 80% relief (valium for presedation, needs ) - pt would like to repeat  B/l lumbar paraspinal TPI under US guidance with minimal relief thus I do not recommend repeat  Consider b/l SIJ injection prn  PT - completed, neg leg length discrep. Patient encouraged to stay active and to lose weight  Treatment plan discussed with the patient including medications and procedure side effects     We discussed with the patient that combining opioids, benzodiazepines, alcohol, illicit drugs or sleep aids increases the risk of respiratory depression including death. We discussed that these medications may cause drowsiness, sedation or dizziness and have counseled the patient not to drive or operate machinery. We have discussed that these medications will be prescribed only by one provider. We have discussed with the patient about age related risk factors and have thoroughly discussed the importance of taking these medications as prescribed. The patient verbalizes understanding. Cc:  Referring physician    KIM Bonilla.

## 2022-06-17 ENCOUNTER — TELEPHONE (OUTPATIENT)
Dept: PAIN MANAGEMENT | Age: 47
End: 2022-06-17

## 2022-06-17 NOTE — TELEPHONE ENCOUNTER
Call to Reji Sherlyn that procedure was approved for 06/28/2022 and that the surgery center should call her a few days before for the pre op call and after 3:00 PM the business day before with the arrival time. Instructed Mandi to hold ibuprofen for 24 hours, naprosyn for 4 days and any aspirin containing products or fish oil for 7 days. Instructed to call office back if any questions. Left voice mail message.     Denise Brown RN  Pain Management

## 2022-06-23 NOTE — PROGRESS NOTES
[facility]  INSTRUCTIONS  . .......................................................................................................................................... [x] Parking the day of Surgery is located in the [facility].   Upon entering the door, make immediate right into the surgery reception room    [x]  Bring photo ID and insurance card     [x] You may have a light breakfast day of procedure    [x]  Wear loose comfortable clothing    [x]  Please follow instructions for medications as given per Dr's office    [x] You can expect a call the business day prior to procedure to notify you of your arrival time     [x] Please arrange for     []  Other instructions

## 2022-06-28 ENCOUNTER — HOSPITAL ENCOUNTER (OUTPATIENT)
Dept: GENERAL RADIOLOGY | Age: 47
Discharge: HOME | End: 2022-06-30
Attending: PAIN MEDICINE
Payer: MEDICAID

## 2022-06-28 ENCOUNTER — HOSPITAL ENCOUNTER (OUTPATIENT)
Age: 47
Setting detail: OUTPATIENT SURGERY
Discharge: HOME | End: 2022-06-28
Attending: PAIN MEDICINE | Admitting: PAIN MEDICINE
Payer: MEDICAID

## 2022-06-28 VITALS
SYSTOLIC BLOOD PRESSURE: 127 MMHG | WEIGHT: 180 LBS | OXYGEN SATURATION: 98 % | BODY MASS INDEX: 29.99 KG/M2 | TEMPERATURE: 97.6 F | RESPIRATION RATE: 16 BRPM | DIASTOLIC BLOOD PRESSURE: 89 MMHG | HEIGHT: 65 IN | HEART RATE: 82 BPM

## 2022-06-28 DIAGNOSIS — R52 PAIN MANAGEMENT: ICD-10-CM

## 2022-06-28 PROCEDURE — 3600000012 HC SURGERY LEVEL 2 ADDTL 15MIN: Performed by: PAIN MEDICINE

## 2022-06-28 PROCEDURE — 6360000002 HC RX W HCPCS: Performed by: PAIN MEDICINE

## 2022-06-28 PROCEDURE — 7100000011 HC PHASE II RECOVERY - ADDTL 15 MIN: Performed by: PAIN MEDICINE

## 2022-06-28 PROCEDURE — 64636 DESTROY L/S FACET JNT ADDL: CPT | Performed by: PAIN MEDICINE

## 2022-06-28 PROCEDURE — 2709999900 HC NON-CHARGEABLE SUPPLY: Performed by: PAIN MEDICINE

## 2022-06-28 PROCEDURE — 2500000003 HC RX 250 WO HCPCS: Performed by: PAIN MEDICINE

## 2022-06-28 PROCEDURE — 7100000010 HC PHASE II RECOVERY - FIRST 15 MIN: Performed by: PAIN MEDICINE

## 2022-06-28 PROCEDURE — 3600000002 HC SURGERY LEVEL 2 BASE: Performed by: PAIN MEDICINE

## 2022-06-28 PROCEDURE — 64635 DESTROY LUMB/SAC FACET JNT: CPT | Performed by: PAIN MEDICINE

## 2022-06-28 PROCEDURE — 3209999900 FLUORO FOR SURGICAL PROCEDURES

## 2022-06-28 RX ORDER — BUPIVACAINE HYDROCHLORIDE 2.5 MG/ML
INJECTION, SOLUTION EPIDURAL; INFILTRATION; INTRACAUDAL PRN
Status: DISCONTINUED | OUTPATIENT
Start: 2022-06-28 | End: 2022-06-28 | Stop reason: ALTCHOICE

## 2022-06-28 RX ORDER — METHYLPREDNISOLONE ACETATE 40 MG/ML
INJECTION, SUSPENSION INTRA-ARTICULAR; INTRALESIONAL; INTRAMUSCULAR; SOFT TISSUE PRN
Status: DISCONTINUED | OUTPATIENT
Start: 2022-06-28 | End: 2022-06-28 | Stop reason: ALTCHOICE

## 2022-06-28 RX ORDER — LIDOCAINE HYDROCHLORIDE 20 MG/ML
INJECTION, SOLUTION INFILTRATION; PERINEURAL PRN
Status: DISCONTINUED | OUTPATIENT
Start: 2022-06-28 | End: 2022-06-28 | Stop reason: ALTCHOICE

## 2022-06-28 ASSESSMENT — PAIN SCALES - GENERAL
PAINLEVEL_OUTOF10: 6

## 2022-06-28 ASSESSMENT — PAIN DESCRIPTION - PAIN TYPE
TYPE: CHRONIC PAIN
TYPE: CHRONIC PAIN
TYPE: SURGICAL PAIN

## 2022-06-28 ASSESSMENT — PAIN DESCRIPTION - LOCATION
LOCATION: BACK;NECK;HAND
LOCATION: BACK;NECK;HAND

## 2022-06-28 ASSESSMENT — PAIN DESCRIPTION - DESCRIPTORS
DESCRIPTORS: DISCOMFORT

## 2022-06-28 ASSESSMENT — PAIN - FUNCTIONAL ASSESSMENT: PAIN_FUNCTIONAL_ASSESSMENT: 0-10

## 2022-06-28 ASSESSMENT — PAIN DESCRIPTION - FREQUENCY
FREQUENCY: CONTINUOUS
FREQUENCY: CONTINUOUS

## 2022-06-28 NOTE — H&P
[facility]        [address] [facility], [address]  Dept: [phone]    Procedure History & Physical      Corey Medellin     HPI:    Patient  is here for right LBP for right L3,4, 5 RFA  Labs/imaging studies reviewed   All question and concerns addressed including R/B/A associated with the procedure    Past Medical History:   Diagnosis Date    Anxiety     Arthritis     Asthma exacerbation with COPD (chronic obstructive pulmonary disease) ([address]     Christina-Danlos syndrome     Essential hypertension     Fibromyalgia     GERD (gastroesophageal reflux disease)     Major depression     Malabsorption syndrome     POTS (postural orthostatic tachycardia syndrome)     PTSD (post-traumatic stress disorder)     Rheumatoid arthritis ([address]     Seizures ([facility] 2005    due to Ultram     Sx of RLS (restless legs syndrome)        Past Surgical History:   Procedure Laterality Date    ABDOMEN SURGERY  3/5/12    endometreosis    ANESTHESIA NERVE BLOCK Bilateral 6/16/2020    BILATERAL L3 L4 L5 MEDIAL NERVE BRANCH BLOCK   #1 performed by Rene Herrmann DO at [address] [place] [address] Bilateral 7/14/2020    BILATERAL L3 4 5 MEDIAL NERVE BRANCH BLOCK # 2 performed by Rene Herrmann DO at [address] [place] [address]  11/01/2016    BRONCHOSCOPY  12/14/2016    CHOLECYSTECTOMY  02/25/2016    lap    COLONOSCOPY      ENDOMETRIAL ABLATION      ENDOSCOPY, COLON, DIAGNOSTIC      HYSTERECTOMY (CERVIX STATUS UNKNOWN)      PAIN MANAGEMENT PROCEDURE Left 7/28/2020    LEFT L3 4 5 MEDIAL NERVE BRANCH RADIOFREQUENCY ABLATION performed by Rene Herrmann DO at [address] [place] [address] Right 8/27/2020    RIGHT L3 4 5 MEDIAL NERVE BRANCH RADIOFREQUENCY ABLATION performed by Rene Herrmann DO at [address] [place] [address] Right 5/18/2021    RIGHT L3, 4, 5  RADIOFREQUENCY ABLATION performed by Rene Herrmann DO at [address] [place] [address] Left 6/10/2021    LEFT L3, 4, 5  RADIOFREQUENCY ABLATION performed by Agata Matthew DO at [address] [place] [address]      related to endometriosis    MARCELINO AND BSO (CERVIX REMOVED)      TONSILLECTOMY         Prior to Admission medications    Medication Sig Start Date End Date Taking?  Authorizing Provider   cetirizine (ZYRTEC) 10 MG tablet Take 1 tablet by mouth daily 6/8/22   Altaf Motley DO   albuterol sulfate HFA (VENTOLIN HFA) 108 (90 Base) MCG/ACT inhaler Inhale 1 puff into the lungs every 4 hours as needed for Wheezing 4/12/22   Jignesh F Krys, DO   montelukast (SINGULAIR) 10 MG tablet Take 1 tablet by mouth daily 4/12/22   Altaf Motley, DO   vitamin D (ERGOCALCIFEROL) 1.25 MG (01147 UT) CAPS capsule Take 1 capsule by mouth once a week 3/25/22   Altaf Motley DO   EPINEPHrine (EPIPEN 2-ZAINAB) 0.3 MG/0.3ML SOAJ injection Inject 0.3 mLs into the muscle once for 1 dose Use as directed for allergic reaction 3/24/22 3/24/22  Altaf Motley DO   Handicap Placard MISC by Does not apply route Patient cannot walk 200 ft without stopping to rest.    Expiration 5 yrs 9/23/21   Altaf Motley, DO   sodium chloride POWD powder Take 1 g by mouth 3 times daily (with meals)    Historical Provider, MD   Elastic Bandages & Supports (ABDOMINAL BINDER/ELASTIC LARGE) MISC 1 Device by Does not apply route daily 5/12/21   Jody Walker MD   propranolol (INDERAL) 20 MG tablet Take 1 tablet by mouth 3 times daily  Patient taking differently: Take 80 mg by mouth daily  5/12/21   Jody Walker MD   Handicap Placard MISC by Does not apply route Patient cannot walk 200 ft without stopping to rest.    Expiration 5 YRS 1/7/21   Jignesh F Krys, DO   cimetidine (TAGAMET) 300 MG tablet Take 300 mg by mouth 2 times daily     Historical Provider, MD   Acetaminophen (TYLENOL PO) Take by mouth    Historical Provider, MD   lidocaine 4 % external patch Place 1 patch onto the skin daily    Historical Provider, MD folic acid (FOLVITE) 1 MG tablet Take 1 tablet by mouth daily  Patient taking differently: Take 4 mg by mouth daily  7/22/20   Beryl Duque DO   medical marijuana Take by mouth as needed. Historical Provider, MD   EMGALITY 120 MG/ML SOSY every 30 days  10/1/19   Historical Provider, MD   VYVANSE 60 MG CAPS Take 60 mg by mouth daily. 8/3/19   Historical Provider, MD   dexlansoprazole (DEXILANT) 60 MG CPDR delayed release capsule Take 1 capsule by mouth daily 12/17/18   Marilee Kapadia DO   estradiol (ESTRACE) 0.5 MG tablet Take 0.5 mg by mouth daily    Historical Provider, MD   baclofen (LIORESAL) 20 MG tablet Take 20 mg by mouth 3 times daily    Historical Provider, MD   PAROXETINE HCL PO Take 15 mg by mouth nightly     Historical Provider, MD   LORazepam (ATIVAN) 1 MG tablet Take 1 mg by mouth 2 times daily  . 8/14/16   Historical Provider, MD   Probiotic Product (PROBIOTIC DAILY PO) Take 2 capsules by mouth every morning     Historical Provider, MD       Allergies   Allergen Reactions    Codeine Hives    Demerol Hives    Morphine Hives     pt states that she has tolerated Dilaudid in the past w/o reaction (5/4/11)    Topiramate      Other reaction(s): Other: See Comments  heart palpitations    Tramadol Other (See Comments)     Other reaction(s):  Other: See Comments  also seizure     Casein Nausea And Vomiting    Eggs Or Egg-Derived Products Nausea And Vomiting    Gluten Meal Nausea And Vomiting    Nsaids      Other reaction(s): GI Upset  H/o ulcers    Peanuts [Peanut Oil] Nausea And Vomiting    Prochlorperazine Edisylate Nausea And Vomiting    Trazodone And Nefazodone Other (See Comments)     Nasal sinus swelling    Whey Nausea And Vomiting       Social History     Socioeconomic History    Marital status:      Spouse name: Not on file    Number of children: Not on file    Years of education: Not on file    Highest education level: Not on file   Occupational History    Not on file   Tobacco Use    Smoking status: Current Some Day Smoker     Packs/day: 0.25     Years: 15.00     Pack years: 3.75     Types: Cigarettes     Start date: 10/28/2004    Smokeless tobacco: Never Used    Tobacco comment: smokes 5-10 cigs on stressful days then can go week without   Vaping Use    Vaping Use: Never used   Substance and Sexual Activity    Alcohol use: No    Drug use: Yes     Types: Marijuana Rosamaria Palak)     Comment: medical - transdermal patch - edibles    Sexual activity: Not on file   Other Topics Concern    Not on file   Social History Narrative    Not on file     Social Determinants of Health     Financial Resource Strain: Low Risk     Difficulty of Paying Living Expenses: Not hard at all   Food Insecurity: No Food Insecurity    Worried About Running Out of Food in the Last Year: Never true    Osman of Food in the Last Year: Never true   Transportation Needs:     Lack of Transportation (Medical): Not on file    Lack of Transportation (Non-Medical):  Not on file   Physical Activity:     Days of Exercise per Week: Not on file    Minutes of Exercise per Session: Not on file   Stress:     Feeling of Stress : Not on file   Social Connections:     Frequency of Communication with Friends and Family: Not on file    Frequency of Social Gatherings with Friends and Family: Not on file    Attends [employer] Services: Not on file    Active Member of [address] [place] [address] or Organizations: Not on file    Attends Club or Organization Meetings: Not on file    Marital Status: Not on file   Intimate Partner Violence:     Fear of Current or Ex-Partner: Not on file    Emotionally Abused: Not on file    Physically Abused: Not on file    Sexually Abused: Not on file   Housing Stability:     Unable to Pay for Housing in the Last Year: Not on file    Number of [place] in the Last Year: Not on file    Unstable Housing in the Last Year: Not on file       Family History   Problem Relation Age of Onset    High Blood Pressure Father     High Cholesterol Father     High Blood Pressure Mother     No Known Problems Sister          REVIEW OF SYSTEMS:    CONSTITUTIONAL:  negative for  fevers, chills, sweats and fatigue    RESPIRATORY:  negative for  dry cough, cough with sputum, dyspnea, wheezing and chest pain    CARDIOVASCULAR:  negative for chest pain, dyspnea, palpitations, syncope    GASTROINTESTINAL:  negative for nausea, vomiting, change in bowel habits, diarrhea, constipation and abdominal pain    MUSCULOSKELETAL: negative for muscle weakness    SKIN: negative for itching or rashes. BEHAVIOR/PSYCH:  negative for poor appetite, increased appetite, decreased sleep and poor concentration    All other systems negative      PHYSICAL EXAM:    VITALS:  BP (!) 135/93   Pulse 95   Temp 97.6 °F (36.4 °C) (Temporal)   Resp 18   Ht 5' 5\" (1.651 m)   Wt 180 lb (81.6 kg)   SpO2 97%   BMI 29.95 kg/m²     CONSTITUTIONAL:  awake, alert, cooperative, no apparent distress, and appears stated age    EYES: PERRLA, EOMI    LUNGS:  No increased work of breathing, no audible wheezing    CARDIOVASCULAR:  regular rate and rhythm    ABDOMEN:  Soft non tender non distended     EXTREMITIES: no signs of clubbing or cyanosis. MUSCULOSKELETAL: negative for flaccid muscle tone or spastic movements. SKIN: gross examination reveals no signs of rashes, or diaphoresis. NEURO: Cranial nerves II-XII grossly intact. No signs of agitated mood.        Assessment/Plan:    Right LB pain for right L3,4,5 RFA

## 2022-06-28 NOTE — OP NOTE
2022    Patient: Lillian Fleming  :  1975  Age:  42 y.o. Sex:  female     PRE-OPERATIVE DIAGNOSIS: Right Lumbar spondylosis, lumbar facet arthropathy. POST-OPERATIVE DIAGNOSIS: Same. PROCEDURE:  Fluoroscopic-guided Right  Lumbar facet medial branch radiofrequency ablation at levels L3,4,5 (anesthetizing the L4-5 and L5-S1 facet joints). SURGEON:  KIM Alston. ANESTHESIA: local    ESTIMATED BLOOD LOSS: None.  ______________________________________________________________________  HISTORY & PHYSICAL: Lillian Fleming presents today for fluoroscopic-guided Right lumbar facet medial branch radiofrequency ablation at levels L3,4,5. The potential complications of the procedure were explained to [facility] again today and she has elected to undergo the aforementioned procedure. [facility] complete History & Physical examination were reviewed in depth, a copy of which is in the chart. DESCRIPTION OF PROCEDURE:    After confirming written and informed consent, a time-out was performed and [place] name and date of birth, the procedure to be performed as well as the plan for the location of the needle insertion were confirmed. The patient was brought into the procedure room and placed in the prone position on the fluoroscopy table. Standard monitors were placed and vital signs were observed throughout the procedure. The area of the lumbar spine and upper buttocks was sterilely prepped with chloraprep and draped in a sterile manner. AP fluoroscopy was used to identify and cordelia bartons point at the targeted area. A 22 gauge 10 mm radiofrequency probe was advanced toward each of these points. Once bone was contacted, negative aspiration for blood and CSF was confirmed, sensory stimulation was performed at 50 Hz and at 0.4 volts generating a pressure sensation. Motor stimulation < 2.0 volts elicited multifidus twitching without any radicular symptoms.  1 ml of 2% lidocaine was injected prior to lesioning, which was performed for 90 seconds at 90 degrees centigrade. Once the lesions were complete, a solution of 0.25% marcaine 3 cc and 40 mg DepoMedrol was injected and distributed equally through each probe. The probes were removed . The patient's back was cleaned and bandages were placed over the needle insertion sites. Disposition the patient tolerated the procedure well and there were no complications . Vital signs remained stable throughout the procedure. The patient was escorted to the recovery area where they remained until discharge and written discharge instructions for the procedure were given. Plan: Priyanka Richardson will return to our pain management center as scheduled.      Michelle Boyd DO

## 2022-06-28 NOTE — PROGRESS NOTES
Leg less numb assisted to side of bed but right leg remains numb and unable to bear weight on right leg. Returned to bed.

## 2022-07-18 ENCOUNTER — TELEPHONE (OUTPATIENT)
Dept: PAIN MANAGEMENT | Age: 47
End: 2022-07-18

## 2022-07-18 NOTE — TELEPHONE ENCOUNTER
Call to [facility], left message that procedure was approved for 7/28/2022 and that the surgery center should call her a few days before for the pre op call and after 3:00 PM the business day before with the arrival time. Instructed Mandi to hold ibuprofen for 24 hours, naprosyn for 4 days and any aspirin containing products or fish oil for 7 days. Instructed to call office back.     Teresa Hernandez RN  Pain Management

## 2022-07-26 ENCOUNTER — TELEPHONE (OUTPATIENT)
Dept: PAIN MANAGEMENT | Age: 47
End: 2022-07-26

## 2022-07-26 DIAGNOSIS — M47.817 LUMBOSACRAL SPONDYLOSIS WITHOUT MYELOPATHY: ICD-10-CM

## 2022-07-26 NOTE — TELEPHONE ENCOUNTER
She took both tabs for her procedure on 6/28/2022. They were 5 mg tabs, the other ones in the past were 10 mg tabs so she assumed she was to take both tabs.

## 2022-07-26 NOTE — TELEPHONE ENCOUNTER
Romayne Klinefelter is scheduled for a procedure on 7/28/2022. She is requesting some valium to take the morning of the procedure.

## 2022-07-27 RX ORDER — DIAZEPAM 5 MG/1
5 TABLET ORAL PRN
Qty: 2 TABLET | Refills: 0 | Status: SHIPPED | OUTPATIENT
Start: 2022-07-27 | End: 2022-07-29

## 2022-07-28 ENCOUNTER — HOSPITAL ENCOUNTER (OUTPATIENT)
Dept: GENERAL RADIOLOGY | Age: 47
Setting detail: OUTPATIENT SURGERY
Discharge: HOME | End: 2022-07-30
Attending: PAIN MEDICINE
Payer: MEDICAID

## 2022-07-28 ENCOUNTER — HOSPITAL ENCOUNTER (OUTPATIENT)
Age: 47
Setting detail: OUTPATIENT SURGERY
Discharge: HOME | End: 2022-07-28
Attending: PAIN MEDICINE | Admitting: PAIN MEDICINE
Payer: MEDICAID

## 2022-07-28 VITALS
HEIGHT: 65 IN | SYSTOLIC BLOOD PRESSURE: 132 MMHG | WEIGHT: 185 LBS | TEMPERATURE: 97.3 F | OXYGEN SATURATION: 99 % | DIASTOLIC BLOOD PRESSURE: 80 MMHG | RESPIRATION RATE: 18 BRPM | BODY MASS INDEX: 30.82 KG/M2 | HEART RATE: 84 BPM

## 2022-07-28 DIAGNOSIS — R52 PAIN MANAGEMENT: ICD-10-CM

## 2022-07-28 PROCEDURE — 3600000012 HC SURGERY LEVEL 2 ADDTL 15MIN: Performed by: PAIN MEDICINE

## 2022-07-28 PROCEDURE — 2500000003 HC RX 250 WO HCPCS: Performed by: PAIN MEDICINE

## 2022-07-28 PROCEDURE — 64636 DESTROY L/S FACET JNT ADDL: CPT | Performed by: PAIN MEDICINE

## 2022-07-28 PROCEDURE — 64635 DESTROY LUMB/SAC FACET JNT: CPT | Performed by: PAIN MEDICINE

## 2022-07-28 PROCEDURE — 7100000011 HC PHASE II RECOVERY - ADDTL 15 MIN: Performed by: PAIN MEDICINE

## 2022-07-28 PROCEDURE — 3209999900 FLUORO FOR SURGICAL PROCEDURES

## 2022-07-28 PROCEDURE — 2709999900 HC NON-CHARGEABLE SUPPLY: Performed by: PAIN MEDICINE

## 2022-07-28 PROCEDURE — 3600000002 HC SURGERY LEVEL 2 BASE: Performed by: PAIN MEDICINE

## 2022-07-28 PROCEDURE — 6360000002 HC RX W HCPCS: Performed by: PAIN MEDICINE

## 2022-07-28 PROCEDURE — 7100000010 HC PHASE II RECOVERY - FIRST 15 MIN: Performed by: PAIN MEDICINE

## 2022-07-28 RX ORDER — LIDOCAINE HYDROCHLORIDE 20 MG/ML
INJECTION, SOLUTION EPIDURAL; INFILTRATION; INTRACAUDAL; PERINEURAL PRN
Status: DISCONTINUED | OUTPATIENT
Start: 2022-07-28 | End: 2022-07-28 | Stop reason: ALTCHOICE

## 2022-07-28 RX ORDER — METHYLPREDNISOLONE ACETATE 40 MG/ML
INJECTION, SUSPENSION INTRA-ARTICULAR; INTRALESIONAL; INTRAMUSCULAR; SOFT TISSUE PRN
Status: DISCONTINUED | OUTPATIENT
Start: 2022-07-28 | End: 2022-07-28 | Stop reason: ALTCHOICE

## 2022-07-28 RX ORDER — BUPIVACAINE HYDROCHLORIDE 2.5 MG/ML
INJECTION, SOLUTION EPIDURAL; INFILTRATION; INTRACAUDAL PRN
Status: DISCONTINUED | OUTPATIENT
Start: 2022-07-28 | End: 2022-07-28 | Stop reason: ALTCHOICE

## 2022-07-28 ASSESSMENT — PAIN SCALES - GENERAL
PAINLEVEL_OUTOF10: 5
PAINLEVEL_OUTOF10: 3
PAINLEVEL_OUTOF10: 5
PAINLEVEL_OUTOF10: 5
PAINLEVEL_OUTOF10: 3

## 2022-07-28 ASSESSMENT — PAIN - FUNCTIONAL ASSESSMENT: PAIN_FUNCTIONAL_ASSESSMENT: 0-10

## 2022-07-28 ASSESSMENT — PAIN DESCRIPTION - DESCRIPTORS: DESCRIPTORS: DISCOMFORT

## 2022-07-28 NOTE — OP NOTE
2022    Patient: Arnoldo Lopez  :  1975  Age:  42 y.o. Sex:  female     PRE-OPERATIVE DIAGNOSIS: Left Lumbar spondylosis, lumbar facet arthropathy. POST-OPERATIVE DIAGNOSIS: Same. PROCEDURE:  Fluoroscopic-guided Left  Lumbar facet medial branch radiofrequency ablation at levels L3,4,5 (anesthetizing the L4-5 and L5-S1 facet joints). SURGEON:  KIM Krishnan. ANESTHESIA: local    ESTIMATED BLOOD LOSS: None.  ______________________________________________________________________  HISTORY & PHYSICAL: Arnoldo Lopez presents today for fluoroscopic-guided Left lumbar facet medial branch radiofrequency ablation at levels L3,4,5. The potential complications of the procedure were explained to [facility] again today and she has elected to undergo the aforementioned procedure. [facility] complete History & Physical examination were reviewed in depth, a copy of which is in the chart. DESCRIPTION OF PROCEDURE:    After confirming written and informed consent, a time-out was performed and [facility] name and date of birth, the procedure to be performed as well as the plan for the location of the needle insertion were confirmed. The patient was brought into the procedure room and placed in the prone position on the fluoroscopy table. Standard monitors were placed and vital signs were observed throughout the procedure. The area of the lumbar spine and upper buttocks was sterilely prepped with chloraprep and draped in a sterile manner. AP fluoroscopy was used to identify and cordelia bartons point at the targeted area. A 22 gauge 10 mm radiofrequency probe was advanced toward each of these points. Once bone was contacted, negative aspiration for blood and CSF was confirmed, sensory stimulation was performed at 50 Hz and at 0.4 volts generating a pressure sensation. Motor stimulation < 2.0 volts elicited multifidus twitching without any radicular symptoms.  1 ml of 2% lidocaine was injected prior to lesioning, which was performed for 90 seconds at 90 degrees centigrade. Once the lesions were complete, a solution of 0.25% marcaine 3 cc and 40 mg DepoMedrol was injected and distributed equally through each probe. The probes were removed . The patient's back was cleaned and bandages were placed over the needle insertion sites. Disposition the patient tolerated the procedure well and there were no complications . Vital signs remained stable throughout the procedure. The patient was escorted to the recovery area where they remained until discharge and written discharge instructions for the procedure were given. Plan: Marty Colby will return to our pain management center as scheduled.      Patricia Reshma, DO

## 2022-07-28 NOTE — H&P
[facility]        [address] [facility], [facility]  Dept: [phone]    Procedure History & Physical      Lo Borrero     HPI:    Patient  is here for left LBP for left L3,4,5 RFA  Labs/imaging studies reviewed   All question and concerns addressed including R/B/A associated with the procedure    Past Medical History:   Diagnosis Date    Anxiety     Arthritis     Asthma exacerbation with COPD (chronic obstructive pulmonary disease) ([facility] 2016    Christina-Danlos syndrome     connective tissue syndrome    Essential hypertension     Fibromyalgia     GERD (gastroesophageal reflux disease)     Major depression     Malabsorption syndrome     POTS (postural orthostatic tachycardia syndrome)     PTSD (post-traumatic stress disorder)     Rheumatoid arthritis ([facility]     Seizures ([facility] 2005    due to Ultram     Sx of RLS (restless legs syndrome)        Past Surgical History:   Procedure Laterality Date    ABDOMEN SURGERY  3/5/12    endometreosis    ANESTHESIA NERVE BLOCK Bilateral 6/16/2020    BILATERAL L3 L4 L5 MEDIAL NERVE BRANCH BLOCK   #1 performed by Jessica Whitlock DO at [address] [place] [address],[address] Bilateral 7/14/2020    BILATERAL L3 4 5 MEDIAL NERVE BRANCH BLOCK # 2 performed by Jessica Whitlock DO at [address] [place] [address]  11/01/2016    BRONCHOSCOPY  12/14/2016    CHOLECYSTECTOMY  02/25/2016    lap    COLONOSCOPY      ENDOMETRIAL ABLATION      ENDOSCOPY, COLON, DIAGNOSTIC      HYSTERECTOMY (CERVIX STATUS UNKNOWN)      PAIN MANAGEMENT PROCEDURE Left 7/28/2020    LEFT L3 4 5 MEDIAL NERVE BRANCH RADIOFREQUENCY ABLATION performed by Jessica Whitlock DO at [address] Right 8/27/2020    RIGHT L3 4 5 MEDIAL NERVE BRANCH RADIOFREQUENCY ABLATION performed by Jessica Whitlock DO at [address] Right 5/18/2021    RIGHT L3, 4, 5  RADIOFREQUENCY ABLATION performed by Jessica Whitlock DO at [address] Left 6/10/2021    LEFT L3, 4, 5  RADIOFREQUENCY ABLATION performed by Gunnar Carter DO at [address] Right 6/28/2022    RIGHT L3, L4, L5 RADIOFREQUENCY ABLATION performed by Gunnar Carter DO at [address]      related to endometriosis    MARCELINO AND BSO (CERVIX REMOVED)      TONSILLECTOMY         Prior to Admission medications    Medication Sig Start Date End Date Taking? Authorizing Provider   diazePAM (VALIUM) 5 MG tablet Take 1 tablet by mouth as needed for Anxiety or Sleep (one hour prior to procedure) for up to 2 doses. 7/27/22 7/29/22  Gunnar Carter DO   cetirizine (ZYRTEC) 10 MG tablet Take 1 tablet by mouth daily 6/8/22   Bertram Garcia DO   albuterol sulfate HFA (VENTOLIN HFA) 108 (90 Base) MCG/ACT inhaler Inhale 1 puff into the lungs every 4 hours as needed for Wheezing 4/12/22   Jignesh Marcano DO   montelukast (SINGULAIR) 10 MG tablet Take 1 tablet by mouth daily 4/12/22   Bertram Garcia DO   vitamin D (ERGOCALCIFEROL) 1.25 MG (49369 UT) CAPS capsule Take 1 capsule by mouth once a week 3/25/22   Bertram Garcia DO   EPINEPHrine (EPIPEN 2-ZAINAB) 0.3 MG/0.3ML SOAJ injection Inject 0.3 mLs into the muscle once for 1 dose Use as directed for allergic reaction 3/24/22 3/24/22  Bertram Garcia DO   Handicap Placard MISC by Does not apply route Patient cannot walk 200 ft without stopping to rest.    Expiration 5 yrs 9/23/21   Bertram Garcia DO   sodium chloride POWD powder Take 1 g by mouth 3 times daily (with meals)    Historical Provider, MD   Elastic Bandages & Supports (ABDOMINAL BINDER/ELASTIC LARGE) MISC 1 Device by Does not apply route daily 5/12/21   Peyman Walker MD   propranolol (INDERAL) 20 MG tablet Take 1 tablet by mouth 3 times daily  Patient taking differently: Take 80 mg by mouth in the morning.  5/12/21   Peyman Walker MD   Handicap Placard MISC by Does not apply route Patient cannot walk 200 ft without stopping to rest. Expiration 5 YRS 1/7/21   Jignesh Curtis Burn, DO   cimetidine (TAGAMET) 300 MG tablet Take 300 mg by mouth 2 times daily     Historical Provider, MD   Acetaminophen (TYLENOL PO) Take by mouth    Historical Provider, MD   lidocaine 4 % external patch Place 1 patch onto the skin daily    Historical Provider, MD   folic acid (FOLVITE) 1 MG tablet Take 1 tablet by mouth daily  Patient taking differently: Take 4 mg by mouth in the morning. 7/22/20   Laura Boykin,    medical marijuana Take by mouth as needed. Transdermal patch    Historical Provider, MD   EMGALITY 120 MG/ML SOSY every 30 days  10/1/19   Historical Provider, MD   VYVANSE 60 MG CAPS Take 60 mg by mouth daily. 8/3/19   Historical Provider, MD   dexlansoprazole (DEXILANT) 60 MG CPDR delayed release capsule Take 1 capsule by mouth daily 12/17/18   Maricarmen Agee,    estradiol (ESTRACE) 0.5 MG tablet Take 0.5 mg by mouth daily    Historical Provider, MD   baclofen (LIORESAL) 20 MG tablet Take 20 mg by mouth 3 times daily    Historical Provider, MD   PAROXETINE HCL PO Take 15 mg by mouth nightly     Historical Provider, MD   LORazepam (ATIVAN) 1 MG tablet Take 1 mg by mouth 2 times daily  . 8/14/16   Historical Provider, MD   Probiotic Product (PROBIOTIC DAILY PO) Take 2 capsules by mouth every morning     Historical Provider, MD       Allergies   Allergen Reactions    Codeine Hives    Demerol Hives    Morphine Hives     pt states that she has tolerated Dilaudid in the past w/o reaction (5/4/11)    Topiramate      Other reaction(s): Other: See Comments  heart palpitations    Tramadol Other (See Comments)     Other reaction(s):  Other: See Comments  also seizure     Casein Nausea And Vomiting    Eggs Or Egg-Derived Products Nausea And Vomiting    Gluten Meal Nausea And Vomiting    Nsaids      Other reaction(s): GI Upset  H/o ulcers    Peanuts [Peanut Oil] Nausea And Vomiting    Prochlorperazine Edisylate Nausea And Vomiting    Trazodone And Nefazodone Other (See Comments)     Nasal sinus swelling    Whey Nausea And Vomiting       Social History     Socioeconomic History    Marital status:      Spouse name: Not on file    Number of children: Not on file    Years of education: Not on file    Highest education level: Not on file   Occupational History    Not on file   Tobacco Use    Smoking status: Some Days     Packs/day: 0.25     Years: 15.00     Pack years: 3.75     Types: Cigarettes     Start date: 10/28/2004    Smokeless tobacco: Never    Tobacco comments:     smokes 5-10 cigs on some days   Vaping Use    Vaping Use: Never used   Substance and Sexual Activity    Alcohol use: No    Drug use: Yes     Types: Marijuana Champ Cue)     Comment: medical - transdermal patch - edibles    Sexual activity: Not on file   Other Topics Concern    Not on file   Social History Narrative    Not on file     Social Determinants of Health     Financial Resource Strain: Low Risk     Difficulty of Paying Living Expenses: Not hard at all   Food Insecurity: No Food Insecurity    Worried About Running Out of Food in the Last Year: Never true    Ran Out of Food in the Last Year: Never true   Transportation Needs: Not on file   Physical Activity: Not on file   Stress: Not on file   Social Connections: Not on file   Intimate Partner Violence: Not on file   Housing Stability: Not on file       Family History   Problem Relation Age of Onset    High Blood Pressure Father     High Cholesterol Father     High Blood Pressure Mother     No Known Problems Sister          REVIEW OF SYSTEMS:    CONSTITUTIONAL:  negative for  fevers, chills, sweats and fatigue    RESPIRATORY:  negative for  dry cough, cough with sputum, dyspnea, wheezing and chest pain    CARDIOVASCULAR:  negative for chest pain, dyspnea, palpitations, syncope    GASTROINTESTINAL:  negative for nausea, vomiting, change in bowel habits, diarrhea, constipation and abdominal pain    MUSCULOSKELETAL: negative for muscle weakness    SKIN: negative for itching or rashes. BEHAVIOR/PSYCH:  negative for poor appetite, increased appetite, decreased sleep and poor concentration    All other systems negative      PHYSICAL EXAM:    VITALS:  /85   Pulse 95   Temp 97.8 °F (36.6 °C) (Temporal)   Resp 18   Ht 5' 5\" (1.651 m)   Wt 185 lb (83.9 kg)   SpO2 99%   BMI 30.79 kg/m²     CONSTITUTIONAL:  awake, alert, cooperative, no apparent distress, and appears stated age    EYES: PERRLA, EOMI    LUNGS:  No increased work of breathing, no audible wheezing    CARDIOVASCULAR:  regular rate and rhythm    ABDOMEN:  Soft non tender non distended     EXTREMITIES: no signs of clubbing or cyanosis. MUSCULOSKELETAL: negative for flaccid muscle tone or spastic movements. SKIN: gross examination reveals no signs of rashes, or diaphoresis. NEURO: Cranial nerves II-XII grossly intact. No signs of agitated mood.        Assessment/Plan:    Left LB pain for left L3,4,5 RFA

## 2022-07-28 NOTE — PROGRESS NOTES
Patient up and assisted with ambulation in room with cane. States numbness in left leg much improved. Will call father for ride home.

## 2022-08-10 ENCOUNTER — APPOINTMENT (OUTPATIENT)
Dept: CT IMAGING | Age: 47
End: 2022-08-10
Payer: MEDICAID

## 2022-08-10 ENCOUNTER — OFFICE VISIT (OUTPATIENT)
Dept: PAIN MANAGEMENT | Age: 47
End: 2022-08-10
Payer: MEDICAID

## 2022-08-10 ENCOUNTER — HOSPITAL ENCOUNTER (EMERGENCY)
Age: 47
Discharge: HOME | End: 2022-08-10
Attending: EMERGENCY MEDICINE
Payer: MEDICAID

## 2022-08-10 VITALS
BODY MASS INDEX: 29.99 KG/M2 | WEIGHT: 180 LBS | OXYGEN SATURATION: 99 % | HEART RATE: 68 BPM | RESPIRATION RATE: 18 BRPM | TEMPERATURE: 97.4 F | HEIGHT: 65 IN | SYSTOLIC BLOOD PRESSURE: 147 MMHG | DIASTOLIC BLOOD PRESSURE: 80 MMHG

## 2022-08-10 VITALS
HEIGHT: 65 IN | DIASTOLIC BLOOD PRESSURE: 90 MMHG | WEIGHT: 185 LBS | BODY MASS INDEX: 30.82 KG/M2 | RESPIRATION RATE: 16 BRPM | OXYGEN SATURATION: 98 % | TEMPERATURE: 97.4 F | SYSTOLIC BLOOD PRESSURE: 133 MMHG

## 2022-08-10 DIAGNOSIS — M54.50 CHRONIC BILATERAL LOW BACK PAIN WITHOUT SCIATICA: ICD-10-CM

## 2022-08-10 DIAGNOSIS — M47.817 LUMBOSACRAL SPONDYLOSIS WITHOUT MYELOPATHY: ICD-10-CM

## 2022-08-10 DIAGNOSIS — G89.4 CHRONIC PAIN SYNDROME: Primary | ICD-10-CM

## 2022-08-10 DIAGNOSIS — M51.9 LUMBAR DISC DISORDER: ICD-10-CM

## 2022-08-10 DIAGNOSIS — G43.909 MIGRAINE WITHOUT STATUS MIGRAINOSUS, NOT INTRACTABLE, UNSPECIFIED MIGRAINE TYPE: Primary | ICD-10-CM

## 2022-08-10 DIAGNOSIS — G89.29 CHRONIC BILATERAL LOW BACK PAIN WITHOUT SCIATICA: ICD-10-CM

## 2022-08-10 LAB
HCG, URINE, POC: NEGATIVE
Lab: NORMAL
NEGATIVE QC PASS/FAIL: NORMAL
POSITIVE QC PASS/FAIL: NORMAL

## 2022-08-10 PROCEDURE — 6360000004 HC RX CONTRAST MEDICATION: Performed by: RADIOLOGY

## 2022-08-10 PROCEDURE — 2580000003 HC RX 258

## 2022-08-10 PROCEDURE — 70498 CT ANGIOGRAPHY NECK: CPT

## 2022-08-10 PROCEDURE — 99213 OFFICE O/P EST LOW 20 MIN: CPT | Performed by: PAIN MEDICINE

## 2022-08-10 PROCEDURE — 99285 EMERGENCY DEPT VISIT HI MDM: CPT

## 2022-08-10 PROCEDURE — 70496 CT ANGIOGRAPHY HEAD: CPT

## 2022-08-10 RX ORDER — 0.9 % SODIUM CHLORIDE 0.9 %
1000 INTRAVENOUS SOLUTION INTRAVENOUS ONCE
Status: COMPLETED | OUTPATIENT
Start: 2022-08-10 | End: 2022-08-10

## 2022-08-10 RX ADMIN — SODIUM CHLORIDE 1000 ML: 9 INJECTION, SOLUTION INTRAVENOUS at 18:17

## 2022-08-10 RX ADMIN — IOPAMIDOL 75 ML: 755 INJECTION, SOLUTION INTRAVENOUS at 19:42

## 2022-08-10 ASSESSMENT — ENCOUNTER SYMPTOMS
DIARRHEA: 0
CONSTIPATION: 0
ABDOMINAL PAIN: 0
COUGH: 0
VOMITING: 0
SORE THROAT: 0
SINUS PAIN: 0
NAUSEA: 0
COLOR CHANGE: 0
SHORTNESS OF BREATH: 0
EYE DISCHARGE: 0

## 2022-08-10 NOTE — PROGRESS NOTES
Mandi's blood pressure was elevated today. She states that it is elevated because of her POTS syndrome. She was instructed to contact his primary care provider as soon as possible.

## 2022-08-10 NOTE — PROGRESS NOTES
Do you currently have any of the following:    Fever: No  Headache:  No  Cough: No  Shortness of breath: No  Exposed to anyone with these symptoms: No         Mandi Merino presents to the [address] [place] [address] on 8/10/2022. Mandi is complaining of pain low back. The pain is constant. The pain is described as aching, stabbing, dull, and miserable. Pain is rated on her best day at a 3, on her worst day at a 10, and on average at a 5 on the VAS scale. Any procedures since your last visit: Yes, with 70 % relief. Pacemaker or defibrillator: No     She is not on NSAIDS and is not on anticoagulation medications. Medication Contract and Consent for Opioid Use Documents Filed        No documents found                    BP (!) 133/90   Temp 97.4 °F (36.3 °C) (Infrared)   Resp 16   Ht 5' 5\" (1.651 m)   Wt 185 lb (83.9 kg)   SpO2 98%   BMI 30.79 kg/m²      No LMP recorded. Patient has had a hysterectomy.

## 2022-08-10 NOTE — ED PROVIDER NOTES
HPI     Patient is a 42 y.o. female presents with a chief complaint of migraine  This onset 2 days PTA and lasted for about 5 minutes. Patient states that it gets better with nothing. Patient states that it gets worse with nothing. Patient states that it is severe in severity but is currently not experiencing any pain. Patient states it was acute in onset. She states 2 days PTA she had a sharp, left sided, severe headache associated with difficulty speaking. Pt reports this only lasted for about 5 minutes and later she experienced brief visual; changes such as flashing lights, which she comments are common for her with her hx of POTS. She also notes about 3 more episodes of severe sharp pain on the left side of her head since the first incident. Pt currently denies any pain, lightheadedness, dizziness, sinus pain or pressure, CP, SOB, abd pain, N/V/D, and numbness or tingling. Review of Systems   Constitutional:  Negative for chills and fever. HENT:  Negative for congestion, sinus pain and sore throat. Eyes:  Positive for visual disturbance. Negative for discharge. Respiratory:  Negative for cough and shortness of breath. Cardiovascular:  Negative for chest pain and leg swelling. Gastrointestinal:  Negative for abdominal pain, constipation, diarrhea, nausea and vomiting. Endocrine: Negative for polyuria. Genitourinary:  Negative for difficulty urinating, dysuria, frequency and hematuria. Musculoskeletal:  Negative for arthralgias and joint swelling. Skin:  Negative for color change and rash. Neurological:  Positive for speech difficulty and headaches. Negative for dizziness, weakness, light-headedness and numbness. All other systems reviewed and are negative. Physical Exam  Constitutional:       General: She is not in acute distress. Appearance: Normal appearance. HENT:      Head: Normocephalic and atraumatic.       Mouth/Throat:      Mouth: Mucous membranes are moist. Pharynx: Oropharynx is clear. Eyes:      Extraocular Movements: Extraocular movements intact. Conjunctiva/sclera: Conjunctivae normal.      Pupils: Pupils are equal, round, and reactive to light. Cardiovascular:      Rate and Rhythm: Normal rate and regular rhythm. Pulses: Normal pulses. Heart sounds: Normal heart sounds. Pulmonary:      Effort: Pulmonary effort is normal.      Breath sounds: Normal breath sounds. Abdominal:      General: Abdomen is flat. Palpations: Abdomen is soft. Musculoskeletal:         General: No swelling. Normal range of motion. Cervical back: Normal range of motion and neck supple. Skin:     General: Skin is warm and dry. Neurological:      General: No focal deficit present. Mental Status: She is alert and oriented to person, place, and time. Psychiatric:         Mood and Affect: Mood normal.         Behavior: Behavior normal.        Procedures     MDM         Patient is a 42 y.o. female presenting with migraine and difficulty speaking. CTA of head and neck were unremarkable. Vital signs remained unremarkable, with elevated blood pressure stabilizing during ED course. Patient reports feeling good after receiving a liter of fluids. Given patient's symptoms have completely resolved and she is not currently experiencing any symptoms plan to discharge patient to home and follow-up with PCP and neurology discussed with patient. Patient is agreeable to plan. Patient was given return precautions. Labs were interpreted by me. Patient will follow up with their primary care provider. Patient is agreeable to this plan.  Patient has remained stable throughout their stay in the ED.            --------------------------------------------- PAST HISTORY ---------------------------------------------  Past Medical History:  has a past medical history of Anxiety, Arthritis, [facility] exacerb[facility] with [facility] encounter of 08/10/22   POC Pregnancy Urine   Result Value Ref Range    HCG, Urine, POC Negative Negative    Lot Number [id number]     Positive QC Pass/Fail Pass     Negative QC Pass/Fail Pass        Radiology:  CTA HEAD W CONTRAST   Final Result   CT Head:      No acute intracranial hemorrhage or mass effect. No CT evidence of large acute infarct. CTA Neck:      No hemodynamically significant stenosis or dissection of the cervical   internal carotid arteries. No high-grade stenosis or dissection of the cervical vertebral arteries. CTA Head:      No evidence of proximal large vessel arterial occlusion or high-grade   stenosis. CTA NECK W CONTRAST   Final Result   CT Head:      No acute intracranial hemorrhage or mass effect. No CT evidence of large acute infarct. CTA Neck:      No hemodynamically significant stenosis or dissection of the cervical   internal carotid arteries. No high-grade stenosis or dissection of the cervical vertebral arteries. CTA Head:      No evidence of proximal large vessel arterial occlusion or high-grade   stenosis.             ------------------------- NURSING NOTES AND VITALS REVIEWED ---------------------------  Date / Time Roomed:  8/10/2022  4:41 PM  ED Bed Assignment:  04/04    The nursing notes within the ED encounter and vital signs as below have been reviewed. BP (!) 147/80   Pulse 68   Temp 97.4 °F (36.3 °C) (Temporal)   Resp 18   Ht 5' 5\" (1.651 m)   Wt 180 lb (81.6 kg)   SpO2 99%   BMI 29.95 kg/m²   Oxygen Saturation Interpretation: Normal      ------------------------------------------ PROGRESS NOTES ------------------------------------------  10:54 PM EDT  I have spoken with the patient and discussed todays results, in addition to providing specific details for the plan of care and counseling regarding the diagnosis and prognosis. Their questions are answered at this time and they are agreeable with the plan.  I

## 2022-08-10 NOTE — ED TRIAGE NOTES
FIRST PROVIDER CONTACT ASSESSMENT NOTE       Department of Emergency Medicine                 First Provider Note            8/10/22  4:27 PM EDT    Date of Encounter: No admission date for patient encounter. Patient Name: Julius Fox  : 1975  MRN: [id number]    Chief Complaint: Migraine (left side of head \"stabbing\" pain with difficulty speaking 2 days ago followed by visual disturbances 2 hours after, pt states she had an episode of POTS prior to HA, reports intermittent stabbing head pain since, pt reports hx of migraines, denies pain at this time)      History of Present Illness:   Julius Fox is a 42 y.o. female who presents to the ED for stabbing left sided head pain 2 days ago with visual changes to the left eye. History of migraines but this pain is different. Pain is intermittent. Denies any FH of brain aneurysm. Focused Physical Exam:  VS:    ED Triage Vitals   BP Temp Temp src Pulse Resp SpO2 Height Weight   -- -- -- -- -- -- -- --        Physical Ex: Constitutional: Alert and non-toxic. Medical History:  has a past medical history of Anxiety, Arthritis, Asthma exacerbation with COPD (chronic obstructive pulmonary disease) ([address] Christina-Danlos syndrome, Essential hypertension, Fibromyalgia, GERD (gastroesophageal reflux disease), Major depression, Malabsorption syndrome, POTS (postural orthostatic tachycardia syndrome), PTSD (post-traumatic stress disorder), Rheumatoid arthritis ([address] Seizures ([address] and Sx of RLS (restless legs syndrome). Surgical History:  has a past surgical history that includes Endometrial ablation; Hysterectomy; Appendectomy; Tonsillectomy; Abdomen surgery (3/5/12); Small intestine surgery; Total abdominal hysterectomy w/ bilateral salpingoophorectomy; Cholecystectomy (2016); bronchoscopy (2016); Colonoscopy; Endoscopy, colon, diagnostic; bronchoscopy (2016); Anesthesia Nerve Block (Bilateral, 2020);  Anesthesia Nerve Block (Bilateral, 7/14/2020); Pain management procedure (Left, 7/28/2020); Pain management procedure (Right, 8/27/2020); Pain management procedure (Right, 5/18/2021); Pain management procedure (Left, 6/10/2021); Pain management procedure (Right, 6/28/2022); and Pain management procedure (Left, 7/28/2022). Social History:  reports that she has been smoking cigarettes. She started smoking about 17 years ago. She has a 3.75 pack-year smoking history. She has never used smokeless tobacco. She reports current drug use. Drug: Marijuana Stephanie Cid). She reports that she does not drink alcohol. Family History: family history includes High Blood Pressure in her father and mother; High Cholesterol in her father; No Known Problems in her sister.     Allergies: Codeine, Demerol, Morphine, Topiramate, Tramadol, Casein, Eggs or egg-derived products, Gluten meal, Nsaids, Peanuts [peanut oil], Prochlorperazine edisylate, Trazodone and nefazodone, and Whey     Initial Plan of Care: Initiate Treatment-Testing, Proceed toTreatment Area When Bed Available for ED Attending/MLP to Continue Care      ---END OF FIRST PROVIDER CONTACT ASSESSMENT NOTE---  Electronically signed by JUSTIN Morrison   DD: 8/10/22

## 2022-08-10 NOTE — PROGRESS NOTES
DO at [address] [facility],[address] Bilateral 7/14/2020    BILATERAL L3 4 5 MEDIAL NERVE BRANCH BLOCK # 2 performed by Dominguez Carlton, DO at [facility] [place] [address] [address]  11/01/2016    BRONCHOSCOPY  12/14/2016    CHOLECYSTECTOMY  02/25/2016    lap    COLONOSCOPY      ENDOMETRIAL ABLATION      ENDOSCOPY, COLON, DIAGNOSTIC      HYSTERECTOMY (CERVIX STATUS UNKNOWN)      PAIN MANAGEMENT PROCEDURE Left 7/28/2020    LEFT L3 4 5 MEDIAL NERVE BRANCH RADIOFREQUENCY ABLATION performed by Dominguez Carlton, DO at [address] Right 8/27/2020    RIGHT L3 4 5 MEDIAL NERVE BRANCH RADIOFREQUENCY ABLATION performed by Dominguez Carlton, DO at [address] Right 5/18/2021    RIGHT L3, 4, 5  RADIOFREQUENCY ABLATION performed by Dominguez Carlton, DO at [address] Left 6/10/2021    LEFT L3, 4, 5  RADIOFREQUENCY ABLATION performed by Dominguez Carlton, DO at [address] Right 6/28/2022    RIGHT L3, L4, L5 RADIOFREQUENCY ABLATION performed by Dominguez Carlton, DO at [address] Left 7/28/2022    LEFT L3 4 5  RADIOFREQUENCY ABLATION performed by Dominguez Carlton, DO at [place] [id number]      related to endometriosis    MARCELINO AND BSO (CERVIX REMOVED)      TONSILLECTOMY         Prior to Admission medications    Medication Sig Start Date End Date Taking?  Authorizing Provider   cetirizine (ZYRTEC) 10 MG tablet Take 1 tablet by mouth daily 6/8/22  Yes Jignesh F Krys, DO   albuterol sulfate HFA (VENTOLIN HFA) 108 (90 Base) MCG/ACT inhaler Inhale 1 puff into the lungs every 4 hours as needed for Wheezing 4/12/22  Yes Jignesh F Krys, DO   montelukast (SINGULAIR) 10 MG tablet Take 1 tablet by mouth daily 4/12/22  Yes Jignesh F Krys, DO   vitamin D (ERGOCALCIFEROL) 1.25 MG (60466 UT) CAPS capsule Take 1 capsule by mouth once a week 3/25/22  Yes Rachel Mix DO   Handicap Placard MISC by Does not apply route Patient cannot walk 200 ft without stopping to rest.    Expiration 5 yrs 9/23/21  Yes Wale Estrella,    sodium chloride POWD powder Take 1 g by mouth 3 times daily (with meals)   Yes Historical Provider, MD   Elastic Bandages & Supports (ABDOMINAL BINDER/ELASTIC LARGE) MISC 1 Device by Does not apply route daily 5/12/21  Yes Forrest Cancino MD   propranolol (INDERAL) 20 MG tablet Take 1 tablet by mouth 3 times daily  Patient taking differently: Take 80 mg by mouth in the morning. 5/12/21  Yes Forrest Cancino MD   Handicap Placard MISC by Does not apply route Patient cannot walk 200 ft without stopping to rest.    Expiration 5 YRS 1/7/21  Yes Wale Estrella DO   cimetidine (TAGAMET) 300 MG tablet Take 300 mg by mouth 2 times daily    Yes Historical Provider, MD   Acetaminophen (TYLENOL PO) Take by mouth   Yes Historical Provider, MD   lidocaine 4 % external patch Place 1 patch onto the skin daily   Yes Historical Provider, MD   folic acid (FOLVITE) 1 MG tablet Take 1 tablet by mouth daily  Patient taking differently: Take 4 mg by mouth in the morning. 7/22/20  Yes Wale Estrella DO   medical marijuana Take by mouth as needed. Transdermal patch   Yes Historical Provider, MD   EMGALITY 120 MG/ML SOSY every 30 days  10/1/19  Yes Historical Provider, MD   VYVANSE 60 MG CAPS Take 60 mg by mouth daily. 8/3/19  Yes Historical Provider, MD   dexlansoprazole (DEXILANT) 60 MG CPDR delayed release capsule Take 1 capsule by mouth daily 12/17/18  Yes Elias Rugigero,    estradiol (ESTRACE) 0.5 MG tablet Take 0.5 mg by mouth daily   Yes Historical Provider, MD   baclofen (LIORESAL) 20 MG tablet Take 20 mg by mouth 3 times daily   Yes Historical Provider, MD   PAROXETINE HCL PO Take 15 mg by mouth nightly    Yes Historical Provider, MD   LORazepam (ATIVAN) 1 MG tablet Take 1 mg by mouth 2 times daily  .  8/14/16  Yes Historical Provider, MD   EPINEPHrine (EPIPEN 2-ZAINAB) 0.3 MG/0.3ML SOAJ injection Inject 0.3 mLs into the muscle once for 1 dose Use as directed for allergic reaction 3/24/22 3/24/22  Elizabet Motta,    Probiotic Product (PROBIOTIC DAILY PO) Take 2 capsules by mouth every morning   Patient not taking: Reported on 8/10/2022    Historical Provider, MD       Allergies   Allergen Reactions    Codeine Hives    Demerol Hives    Morphine Hives     pt states that she has tolerated Dilaudid in the past w/o reaction (5/4/11)    Topiramate      Other reaction(s): Other: See Comments  heart palpitations    Tramadol Other (See Comments)     Other reaction(s):  Other: See Comments  also seizure     Casein Nausea And Vomiting    Eggs Or Egg-Derived Products Nausea And Vomiting    Gluten Meal Nausea And Vomiting    Nsaids      Other reaction(s): GI Upset  H/o ulcers    Peanuts [Peanut Oil] Nausea And Vomiting    Prochlorperazine Edisylate Nausea And Vomiting    Trazodone And Nefazodone Other (See Comments)     Nasal sinus swelling    Whey Nausea And Vomiting       Social History     Socioeconomic History    Marital status:      Spouse name: Not on file    Number of children: Not on file    Years of education: Not on file    Highest education level: Not on file   Occupational History    Not on file   Tobacco Use    Smoking status: Some Days     Packs/day: 0.25     Years: 15.00     Pack years: 3.75     Types: Cigarettes     Start date: 10/28/2004    Smokeless tobacco: Never    Tobacco comments:     smokes 5-10 cigs on some days   Vaping Use    Vaping Use: Never used   Substance and Sexual Activity    Alcohol use: No    Drug use: Yes     Types: Marijuana Stephanie Cid)     Comment: medical - transdermal patch - edibles    Sexual activity: Not on file   Other Topics Concern    Not on file   Social History Narrative    Not on file     Social Determinants of Health     Financial Resource Strain: Low Risk     Difficulty of Paying Living Expenses: Not hard at all   Food Insecurity: No Food Insecurity    Worried About Running Out of Food in the Last Year: Never true    Ran Out of Food in the Last Year: Never true   Transportation Needs: Not on file   Physical Activity: Not on file   Stress: Not on file   Social Connections: Not on file   Intimate Partner Violence: Not on file   Housing Stability: Not on file       Family History   Problem Relation Age of Onset    High Blood Pressure Father     High Cholesterol Father     High Blood Pressure Mother     No Known Problems Sister        REVIEW OF SYSTEMS:     Mandi denies fever/chills, chest pain, shortness of breath, new bowel or bladder complaints. All other review of systems was negative. PHYSICAL EXAMINATION:      BP (!) 133/90   Temp 97.4 °F (36.3 °C) (Infrared)   Resp 16   Ht 5' 5\" (1.651 m)   Wt 185 lb (83.9 kg)   SpO2 98%   BMI 30.79 kg/m²     General:      General appearance:pleasant and well-hydrated, in no distress and A & O x3  Build:Overweight  Function:Rises from a seated position with difficulty, mild    HEENT:    Head:normocephalic, atraumatic  Pupils:regular, round, equal  Sclera: icterus absent    Lungs:    Breathing:normal breathing pattern    Abdomen:    Shape:non-distended  Tenderness:none  Guarding:none    Lumbar spine:    Spine inspection:normal  CVA tenderness:No   Palpation:tenderness paravertebral muscles, left, right negative  Range of motion:abnormal mildly in lateral bending, flexion, extension rotation bilateral and is painful. Musculoskeletal:    Trigger points in Paraveteral:absent bilaterally  SI joint tenderness:positive right, positive left              MARGY test:not done right, not done left  Piriformis tenderness:negative right, negative left  Trochanteric bursa tenderness:negative right, negative left  SLR:negative right, negative left, sitting     Extremities:    Tremors:None bilaterally upper and lower  Range of motion:pain with internal rotation of hips negative.   Intact:Yes  Varicose veins:absent   Pulses:present Lt radial  Cyanosis:none  Edema:none x all 4 extremities    Neurological:    Sensory:normal to light touch BLE  Motor:                Right Quadriceps5/5          Left Quadriceps5/5           Right Gastrocnemius5/5    Left Gastrocnemius5/5  Right Ant Tibialis5/5  Left Ant Tibialis5/5    Reflexes:  (not assessed today)  Right Quadriceps reflex2+  Left Quadriceps reflex2+  Right Achilles reflex2+  Left Achilles reflex2+  Gait:normal station, with a quad cane    Dermatology:    Skin:no rashes or lesions noted     Impression:     LBP  Lumbar MRI shows DDD without sig central or foraminal stenosis  Referred by NSGY for procedures  Extensive PMHx - RLS, chronic migraines, seizures, rheumatoid arthritis, POTS, GERD, fibromyalgia, HTN, CHANNING, colitis, and a neuromuscular disorder  Hx SCS TRIAL for endometriosis which was complicated by a MRSA infection tx with PICC line abx  Has tried baclofen, lidocaine patches, and medical THC with moderate relief    RFA's were significantly helpful      Plan:    Urine screen deferred  [facility] report reviewed  failed diclofenac gel due to GI upset  Right then left L3,4,5 RFA with 75% relief (valium for presedation, needs ) - pt would like to repeat  B/l lumbar paraspinal TPI under US guidance with minimal relief thus I do not recommend repeat  Consider b/l SIJ injection prn  PT - completed, neg leg length discrep. Patient encouraged to stay active and to lose weight  Treatment plan discussed with the patient including medications and procedure side effects     We discussed with the patient that combining opioids, benzodiazepines, alcohol, illicit drugs or sleep aids increases the risk of respiratory depression including death. We discussed that these medications may cause drowsiness, sedation or dizziness and have counseled the patient not to drive or operate machinery. We have discussed that these medications will be prescribed only by one provider.  We have discussed with the patient about age related risk factors and have thoroughly discussed the importance of taking these medications as prescribed. The patient verbalizes understanding. Cc:  Referring physician    KIM Amin.

## 2022-08-10 NOTE — ED NOTES
Radiology Procedure Waiver   Name: James Duncan  : 1975  MRN: [id number]    Date:  8/10/22    Time: 7:15 PM EDT    Benefits of immediately proceeding with Radiology exam(s) without pre-testing outweigh the risks or are not indicated as specified below and therefore the following is/are being waived:    [] Pregnancy test   [] Patients LMP on-time and regular.   [] Patient had Tubal Ligation or has other Contraception Device. [] Patient  is Menopausal or Premenarcheal.    [] Patient had Full or Partial Hysterectomy. [] Protocol for Iodine allergy    [] MRI Questionnaire     [x] BUN/Creatinine   [x] Patient age w/no hx of renal dysfunction. [] Patient on Dialysis. [] Recent Normal Labs.   Electronically signed by Nick Acuna DO on 8/10/22 at 7:15 PM EDT               Nick Acuna DO  Resident  08/10/22 [id number]

## 2022-08-30 RX ORDER — MONTELUKAST SODIUM 10 MG/1
10 TABLET ORAL DAILY
Qty: 30 TABLET | Refills: 3 | Status: SHIPPED | OUTPATIENT
Start: 2022-08-30

## 2022-11-03 ENCOUNTER — OFFICE VISIT (OUTPATIENT)
Dept: NEUROLOGY | Age: 47
End: 2022-11-03
Payer: MEDICAID

## 2022-11-03 VITALS
BODY MASS INDEX: 30.82 KG/M2 | WEIGHT: 185 LBS | HEIGHT: 65 IN | SYSTOLIC BLOOD PRESSURE: 122 MMHG | DIASTOLIC BLOOD PRESSURE: 90 MMHG

## 2022-11-03 DIAGNOSIS — M47.812 CERVICAL SPONDYLOSIS: ICD-10-CM

## 2022-11-03 DIAGNOSIS — M99.01 SOMATIC DYSFUNCTION OF CERVICAL REGION: Chronic | ICD-10-CM

## 2022-11-03 DIAGNOSIS — F41.1 ANXIETY, GENERALIZED: ICD-10-CM

## 2022-11-03 DIAGNOSIS — G43.109 MIGRAINE WITH AURA AND WITHOUT STATUS MIGRAINOSUS, NOT INTRACTABLE: ICD-10-CM

## 2022-11-03 DIAGNOSIS — M54.2 CERVICALGIA: Primary | ICD-10-CM

## 2022-11-03 DIAGNOSIS — G43.009 MIGRAINE WITHOUT AURA AND WITHOUT STATUS MIGRAINOSUS, NOT INTRACTABLE: ICD-10-CM

## 2022-11-03 PROCEDURE — 3078F DIAST BP <80 MM HG: CPT | Performed by: PSYCHIATRY & NEUROLOGY

## 2022-11-03 PROCEDURE — 3074F SYST BP LT 130 MM HG: CPT | Performed by: PSYCHIATRY & NEUROLOGY

## 2022-11-03 PROCEDURE — 99204 OFFICE O/P NEW MOD 45 MIN: CPT | Performed by: PSYCHIATRY & NEUROLOGY

## 2022-11-03 ASSESSMENT — ENCOUNTER SYMPTOMS
GASTROINTESTINAL NEGATIVE: 1
RESPIRATORY NEGATIVE: 1
ALLERGIC/IMMUNOLOGIC NEGATIVE: 1
BACK PAIN: 1
EYES NEGATIVE: 1

## 2022-11-03 NOTE — PROGRESS NOTES
Neurology Consult Note:    Patient: Gavin Palomino  : 1975  Date: 22  Referring provider: Cecelia Sanches DO    Referral to Neurology: Episodic migraines with and without a visual aura, generally well controlled on Emgality monthly subcutaneous injections, a CGRP-targeted therapy. Dear Cecelia Sanches DO     Thank you for your referral of Gavin Palomino to the Neurology clinic, an alert, anxious, 57-year-old woman with history of migraines with/without a visual aura. Migraines are well-controlled with Emgality 120 mg monthly injections, use of prn medical marijuana, and prn Imitrex. Migraines occur infrequently except for a migraine exacerbation she experienced this past August.  She's tried naratriptan  and Maxalt in the past and currently propranolol 20 mg 3 times daily for POTS. Migraines are of childhood onset. She also sees Drs. At [address] [place] [address] for POTS and her rheumatological condition of Connie Keny. Her mother had a hx of migraines in the past and sister with ocular migraines. +hx severe left hemicranial migraine with aura, stabbing pains on the left temple and there is left-sided neck pain with visual symptoms consistent with a migraine exacerbation with visual aura. She also has chronic posterior cervicalgia and low back pain. She is in pain management with Dr. Julius Massey. She reports she can't tolerate NSAID's for posterior neck pain due PUD. She reports she was a former patient of Dr. Aron Rose, [facility]. In regards to her rheumatological condition, she is concerned of developing cervical spine laxity and requests an x-ray. Lab Data: Reviewed from 3/24/2022, blood glucose 70, abnormal lipid panel    Imaging Data: 8/10/2022, CTA of head/neck: Unremarkable. CT Head:   No acute intracranial hemorrhage or mass effect. No CT evidence of large acute infarct. CTA Neck:   No hemodynamically significant stenosis or dissection of the cervical   internal carotid arteries.    No high-grade stenosis or dissection of the cervical vertebral arteries. CTA Head:   No evidence of proximal large vessel arterial occlusion or high-grade   stenosis. 12/27/21, Cervical spine MRI, [facility]:   1. Dextroconvex curvature of the cervical spine. 2.  Mild degenerative changes within the cervical spine. Current Outpatient Medications   Medication Sig Dispense Refill    montelukast (SINGULAIR) 10 MG tablet Take 1 tablet by mouth daily 30 tablet 3    cetirizine (ZYRTEC) 10 MG tablet Take 1 tablet by mouth daily 30 tablet 5    albuterol sulfate HFA (VENTOLIN HFA) 108 (90 Base) MCG/ACT inhaler Inhale 1 puff into the lungs every 4 hours as needed for Wheezing 1 each 0    vitamin D (ERGOCALCIFEROL) 1.25 MG (81644 UT) CAPS capsule Take 1 capsule by mouth once a week 12 capsule 1    EPINEPHrine (EPIPEN 2-ZAINAB) 0.3 MG/0.3ML SOAJ injection Inject 0.3 mLs into the muscle once for 1 dose Use as directed for allergic reaction 2 each 1    Handicap Placard MISC by Does not apply route Patient cannot walk 200 ft without stopping to rest.    Expiration 5 yrs 1 each 0    sodium chloride POWD powder Take 1 g by mouth 3 times daily (with meals)      Elastic Bandages & Supports (ABDOMINAL BINDER/ELASTIC LARGE) MISC 1 Device by Does not apply route daily 1 each 0    propranolol (INDERAL) 20 MG tablet Take 1 tablet by mouth 3 times daily (Patient taking differently: Take 80 mg by mouth daily) 90 tablet 3    Handicap Placard MISC by Does not apply route Patient cannot walk 200 ft without stopping to rest.    Expiration 5 YRS 1 each 0    cimetidine (TAGAMET) 300 MG tablet Take 300 mg by mouth 2 times daily       Acetaminophen (TYLENOL PO) Take by mouth      lidocaine 4 % external patch Place 1 patch onto the skin daily      folic acid (FOLVITE) 1 MG tablet Take 1 tablet by mouth daily (Patient taking differently: Take 4 mg by mouth daily) 30 tablet 5    medical marijuana Take by mouth as needed.  Transdermal patch      EMGALITY 120 MG/ML SOSY every 30 days       VYVANSE 60 MG CAPS Take 60 mg by mouth daily. dexlansoprazole (DEXILANT) 60 MG CPDR delayed release capsule Take 1 capsule by mouth daily 30 capsule 1    estradiol (ESTRACE) 0.5 MG tablet Take 0.5 mg by mouth daily      baclofen (LIORESAL) 20 MG tablet Take 20 mg by mouth 3 times daily      PAROXETINE HCL PO Take 15 mg by mouth nightly       LORazepam (ATIVAN) 1 MG tablet Take 1 mg by mouth 2 times daily  . Probiotic Product (PROBIOTIC DAILY PO) Take 2 capsules by mouth every morning  (Patient not taking: No sig reported)       No current facility-administered medications for this visit. Allergies   Allergen Reactions    Codeine Hives    Demerol Hives    Morphine Hives     pt states that she has tolerated Dilaudid in the past w/o reaction (5/4/11)    Topiramate      Other reaction(s): Other: See Comments  heart palpitations    Tramadol Other (See Comments)     Other reaction(s):  Other: See Comments  also seizure     Casein Nausea And Vomiting    Eggs Or Egg-Derived Products Nausea And Vomiting    Gluten Meal Nausea And Vomiting    Nsaids      Other reaction(s): GI Upset  H/o ulcers    Peanuts [Peanut Oil] Nausea And Vomiting    Prochlorperazine Edisylate Nausea And Vomiting    Trazodone And Nefazodone Other (See Comments)     Nasal sinus swelling    Whey Nausea And Vomiting       Patient Active Problem List   Diagnosis    Agoraphobia with panic attacks    Fibromyalgia    Lumbar disc disorder    GERD (gastroesophageal reflux disease)    Rheumatoid arthritis ([facility])    MDD (major depressive disorder), recurrent severe, without psychosis ([facility])    Abnormality of gait and mobility    Migraine without status migrainosus, not intractable    Essential hypertension    Vitamin D deficiency    Irritable bowel syndrome with constipation    POTS (postural orthostatic tachycardia syndrome)    Malabsorption syndrome    Chronic pain syndrome    Chronic bilateral low back pain without sciatica Lumbosacral spondylosis without myelopathy    Mild intermittent asthma without complication    Cigarette nicotine dependence without complication    Myalgia    Neuromuscular disease ([facility])    Hypertrophic cardiomyopathy ([facility])    Christina-Danlos syndrome    Peanut allergy    Cervical (neck) region somatic dysfunction    Anxiety, generalized       Past Medical History:   Diagnosis Date    Anxiety     Anxiety, generalized 11/3/2022    Arthritis     Asthma exacerbation with COPD (chronic obstructive pulmonary disease) ([facility] 2016    Christina-Danlos syndrome     connective tissue syndrome    Essential hypertension     Fibromyalgia     GERD (gastroesophageal reflux disease)     Major depression     Malabsorption syndrome     POTS (postural orthostatic tachycardia syndrome)     PTSD (post-traumatic stress disorder)     Rheumatoid arthritis ([facility]     Seizures ([facility] 2005    due to Ultram     Somatic dysfunction of cervical region 11/3/2022    Sx of RLS (restless legs syndrome)        Past Surgical History:   Procedure Laterality Date    ABDOMEN SURGERY  3/5/12    endometreosis    ANESTHESIA NERVE BLOCK Bilateral 6/16/2020    BILATERAL L3 L4 L5 MEDIAL NERVE BRANCH BLOCK   #1 performed by Maryjane Bailey, DO at [address] [place] [address],Suite [facility] Bilateral 7/14/2020    BILATERAL L3 4 5 MEDIAL NERVE BRANCH BLOCK # 2 performed by Maryjane Bailey, DO at [address] [place] [address]  11/01/2016    BRONCHOSCOPY  12/14/2016    CHOLECYSTECTOMY  02/25/2016    lap    COLONOSCOPY      ENDOMETRIAL ABLATION      ENDOSCOPY, COLON, DIAGNOSTIC      HYSTERECTOMY (CERVIX STATUS UNKNOWN)      PAIN MANAGEMENT PROCEDURE Left 7/28/2020    LEFT L3 4 5 MEDIAL NERVE BRANCH RADIOFREQUENCY ABLATION performed by Maryjane Calabreseter, DO at [address] Right 8/27/2020    RIGHT L3 4 5 MEDIAL NERVE BRANCH RADIOFREQUENCY ABLATION performed by Maryjane Bailey, DO at [address] Right 5/18/2021    RIGHT L3, 4, [address] performed by Paul Gutierres DO at [address] Left 6/10/2021    LEFT L3, 4, 5  RADIOFREQUENCY ABLATION performed by Paul Gutierres DO at [address] Right 6/28/2022    RIGHT L3, L4, L5 RADIOFREQUENCY ABLATION performed by Paul Gutierres, DO at [address] Left 7/28/2022    LEFT L3 4 5  RADIOFREQUENCY ABLATION performed by Paul Gutierres DO at [address]      related to endometriosis    MRACELINO AND BSO (CERVIX REMOVED)      TONSILLECTOMY         Family History   Problem Relation Age of Onset    High Blood Pressure Father     High Cholesterol Father     High Blood Pressure Mother     No Known Problems Sister      Social History     Socioeconomic History    Marital status:      Spouse name: Not on file    Number of children: Not on file    Years of education: Not on file    Highest education level: Not on file   Occupational History    Not on file   Tobacco Use    Smoking status: Some Days     Packs/day: 0.25     Years: 15.00     Pack years: 3.75     Types: Cigarettes     Start date: 10/28/2004    Smokeless tobacco: Never    Tobacco comments:     smokes 5-10 cigs on some days   Vaping Use    Vaping Use: Never used   Substance and Sexual Activity    Alcohol use: No    Drug use: Yes     Types: Marijuana Dietra Due)     Comment: medical - transdermal patch - edibles    Sexual activity: Not on file   Other Topics Concern    Not on file   Social History Narrative    Not on file     Social Determinants of Health     Financial Resource Strain: Low Risk     Difficulty of Paying Living Expenses: Not hard at all   Food Insecurity: No Food Insecurity    Worried About Running Out of Food in the Last Year: Never true    Ran Out of Food in the Last Year: Never true   Transportation Needs: Not on file   Physical Activity: Not on file   Stress: Not on file   Social Connections: Not on file   Intimate Partner Violence: Not on file   Housing Stability: Not on file     Review of Systems   Constitutional: Negative. HENT: Negative. Eyes: Negative. Respiratory: Negative. Cardiovascular: Negative. Hx POTS, followed at [facility]   Gastrointestinal: Negative. Endocrine: Negative. Genitourinary: Negative. Musculoskeletal:  Positive for arthralgias, back pain, myalgias and neck pain. Hx FBM   Skin: Negative. Allergic/Immunologic: Negative. Neurological:  Positive for headaches. Hematological: Negative. Psychiatric/Behavioral:  The patient is nervous/anxious. Neurologic Exam:  BP (!) 122/90 (Site: Left Upper Arm, Position: Sitting, Cuff Size: Medium Adult)   Ht 5' 5\" (1.651 m)   Wt 185 lb (83.9 kg)   BMI 30.79 kg/m²   General appearance: Anxious, alert, well-groomed, seated next to her mother, no acute distress  HEENT: Normocephalic/atraumatic. Neck: Supple, no bruits or adventitious sounds  Cardiac: RRR  Respiratory: grossly clear  Extremities: No edema, erythema or cyanosis  Skin: No apparent lesions or rashes  Musculoskeletal: No fasciculations or tremors  Mental Status: Alert, oriented x3  Speech/Language: Clear, fluent  Attention span/Concentration: Grossly intact  Affect/Mood: Moderately anxious, tangential  Insight/Judgement: Fairly good     Fund of Knowledge/Current events: Grossly intact  CN II-XII:     Pupils: Equal, reactive to light, 2.0 mm     EOM's: Full without nystagmus  Visual Fields: Full to confrontation  Fundi: Miosis to light, grossly unremarkable  CN V: normal V1-V3  CN VII: No facial droop  CN VIII: Hearing grossly intact  CN IX-XII: Tongue midline  SCM/Trapezii: 5/5 power  Motor: 5/5 power in the upper and lower extremities without tremor or drift and normal motor tone, intact fine motor function of both hands, symmetric  DTR's: 1+ in the upper extremities, 1+ lower extremities, no ankle clonus, plantar responses are flexor.   Sensory: Grossly intact subjectively to light touch and sharp stick testing throughout. Coordination/Gait: No gross limb dysmetria on finger-to-nose testing, no truncal or cerebellar gait ataxia. Ambulates with the use of a cane due to chronic back pain. Assessment/Plan:  1. Chronic episodic migraines with left hemicranial preference, with and without a visual aura, clinically well controlled on Emgality 120 mg monthly subcutaneous injection, a CGRP targeted therapy and occasional as needed Imitrex which she reports has been used in the past but did not find in her medication list.  Because she is on antidepressant medication including paroxetine, triptans would be contraindicated, thus a medication like Nurtec 75 mg strength tablet at migraine onset or 1 every other day up to #16/month would be an alternative, requiring insurance authorization. 2.  A cervical spine x-ray is ordered in flexion/extension and once resulted will receive test results by phone. 3.  She will be following up periodically with her [facility] doctors for POTS and her rheumatological condition. 4.  Follow-up as needed Neurology clinic in 6 months if clinically indicated. Sincerely,      Luis E Beal MD    Neurology & Clinical Neurophysiology    This note was created using speech recognition transcription software. Despite proofreading, there may be several typographical errors present that may affect the meaning of the content. Please call with any questions. A total time of 40 mins. was spent on the date of service in preparation for this visit, which included face-to-face patient care, completing clinical documentation, counseling and coordination of care based on clinical impression, neurologic diagnosis, review of pertinent imaging studies, test results, implementation and discussion of treatment plan, risk factor reduction and patient and/or family education.     Orders Placed This Encounter   Procedures    XR CERVICAL SPINE FLEXION AND EXTENSION     Standing Status:   Future     Standing Expiration Date:   12/3/2022     Order Specific Question:   Reason for exam:     Answer:   evaluate for Hoang HURD

## 2022-12-07 ENCOUNTER — TELEPHONE (OUTPATIENT)
Dept: FAMILY MEDICINE CLINIC | Age: 47
End: 2022-12-07

## 2022-12-07 NOTE — TELEPHONE ENCOUNTER
----- Message from Caio Daly sent at 12/7/2022  3:02 PM EST -----  Subject: Message to Provider    QUESTIONS  Information for Provider? Pt has active orders and called to schedule   imaging for mammogram and bone density scan. Pt is requesting call back   when available.  ---------------------------------------------------------------------------  --------------  Nguyễn Wilson INFO  [phone]; OK to leave message on voicemail, OK to respond with   electronic message via [employer] portal (only for patients who have   registered [employer] account)  ---------------------------------------------------------------------------  --------------  SCRIPT ANSWERS  Relationship to Patient?  Self

## 2022-12-07 NOTE — TELEPHONE ENCOUNTER
----- Message from Jing Young sent at 12/7/2022  3:02 PM EST -----  Subject: Message to Provider    QUESTIONS  Information for Provider? Pt has active orders and called to schedule   imaging for mammogram and bone density scan. Pt is requesting call back   when available.  ---------------------------------------------------------------------------  --------------  Cande CARLSON  [phone]; OK to leave message on voicemail, OK to respond with   electronic message via [employer] portal (only for patients who have   registered [employer] account)  ---------------------------------------------------------------------------  --------------  SCRIPT ANSWERS  Relationship to Patient?  Self

## 2022-12-16 DIAGNOSIS — M81.0 OSTEOPOROSIS WITHOUT CURRENT PATHOLOGICAL FRACTURE, UNSPECIFIED OSTEOPOROSIS TYPE: Primary | ICD-10-CM

## 2022-12-27 DIAGNOSIS — G43.009 MIGRAINE WITHOUT AURA AND WITHOUT STATUS MIGRAINOSUS, NOT INTRACTABLE: ICD-10-CM

## 2022-12-27 DIAGNOSIS — G43.109 MIGRAINE WITH AURA AND WITHOUT STATUS MIGRAINOSUS, NOT INTRACTABLE: ICD-10-CM

## 2022-12-27 DIAGNOSIS — G43.109 MIGRAINE WITH AURA AND WITHOUT STATUS MIGRAINOSUS, NOT INTRACTABLE: Primary | ICD-10-CM

## 2022-12-27 NOTE — PROGRESS NOTES
Emgality 120 mg subcutaneous injection every 30 days ordered per patient request.  Former patient of Dr. Al Browne. She was previously advised that I will no longer be in practice at [address] past January 10, 2023 and will need to establish with another [facility] Neurology provider if she wishes.

## 2022-12-30 ENCOUNTER — TELEPHONE (OUTPATIENT)
Dept: NEUROLOGY | Age: 47
End: 2022-12-30

## 2023-01-16 RX ORDER — MONTELUKAST SODIUM 10 MG/1
10 TABLET ORAL DAILY
Qty: 30 TABLET | Refills: 3 | Status: SHIPPED | OUTPATIENT
Start: 2023-01-16

## 2023-01-16 RX ORDER — CETIRIZINE HYDROCHLORIDE 10 MG/1
10 TABLET ORAL DAILY
Qty: 30 TABLET | Refills: 5 | Status: SHIPPED | OUTPATIENT
Start: 2023-01-16

## 2023-02-08 ENCOUNTER — OFFICE VISIT (OUTPATIENT)
Dept: PAIN MANAGEMENT | Age: 48
End: 2023-02-08
Payer: MEDICAID

## 2023-02-08 VITALS
HEIGHT: 65 IN | OXYGEN SATURATION: 97 % | RESPIRATION RATE: 16 BRPM | DIASTOLIC BLOOD PRESSURE: 92 MMHG | HEART RATE: 89 BPM | SYSTOLIC BLOOD PRESSURE: 143 MMHG | WEIGHT: 185 LBS | BODY MASS INDEX: 30.82 KG/M2 | TEMPERATURE: 97.3 F

## 2023-02-08 DIAGNOSIS — G89.4 CHRONIC PAIN SYNDROME: Primary | ICD-10-CM

## 2023-02-08 DIAGNOSIS — M79.10 MYALGIA: ICD-10-CM

## 2023-02-08 DIAGNOSIS — M51.9 LUMBAR DISC DISORDER: ICD-10-CM

## 2023-02-08 DIAGNOSIS — M54.50 CHRONIC BILATERAL LOW BACK PAIN WITHOUT SCIATICA: ICD-10-CM

## 2023-02-08 DIAGNOSIS — G89.29 CHRONIC BILATERAL LOW BACK PAIN WITHOUT SCIATICA: ICD-10-CM

## 2023-02-08 DIAGNOSIS — M47.817 LUMBOSACRAL SPONDYLOSIS WITHOUT MYELOPATHY: ICD-10-CM

## 2023-02-08 PROCEDURE — 99213 OFFICE O/P EST LOW 20 MIN: CPT | Performed by: PAIN MEDICINE

## 2023-02-08 RX ORDER — LIDOCAINE 50 MG/G
1 PATCH TOPICAL DAILY
Qty: 30 PATCH | Refills: 5 | Status: SHIPPED | OUTPATIENT
Start: 2023-02-08 | End: 2023-03-10

## 2023-02-08 RX ORDER — CAPSAICIN 0.03 G/100G
1 PATCH TOPICAL DAILY PRN
Qty: 30 PATCH | Refills: 5 | Status: SHIPPED | OUTPATIENT
Start: 2023-02-08 | End: 2023-03-10

## 2023-02-08 NOTE — PROGRESS NOTES
GIN[facility] - BEHAVIORAL HEALTH SERVICES Pain Management        [address], [address] [facility]  Dept: [phone]        Follow up Note      Gerard Marrow     Date of Visit:  02/08/23     CC:  Patient presents for follow up   Chief Complaint   Patient presents with    Follow-up     Low back pain        HPI:    Pain is worse. Change in quality of symptoms:no. Medication side effects:not applicable . Recent diagnostic testing:none. Recent interventional procedures: none. She has not been on anticoagulation medications to include ASA, NSAIDS, Plavix, heparin, LMW heparin and warfarin. The patient  has not been on herbal supplements. The patient is not diabetic. Imaging:   MRI lumbar spine 2/20/20 (per NSGY notes)  \"degenerative disc disease with herniated disc at L4-5. No significant central canal or neuroforaminal stenosis noted. \" (pt states done at [facility])        Potential Aberrant Drug-Related Behavior:      Urine Drug Screening:    OARRS report:  6/2022 - on [facility]    Past Medical History:   Diagnosis Date    Anxiety     Anxiety, generalized 11/3/2022    Arthritis     Asthma exacerbation with COPD (chronic obstructive pulmonary disease) ([facility] [address] 2016    Christina-Danlos syndrome     connective tissue syndrome    Essential hypertension     Fibromyalgia     GERD (gastroesophageal reflux disease)     Major depression     Malabsorption syndrome     POTS (postural orthostatic tachycardia syndrome)     PTSD (post-traumatic stress disorder)     Rheumatoid arthritis ([address]     Seizures ([facility] [address] 2005    due to Ultram     Somatic dysfunction of cervical region 11/3/2022    Sx of RLS (restless legs syndrome)        Past Surgical History:   Procedure Laterality Date    ABDOMEN SURGERY  3/5/12    endometreosis    ANESTHESIA NERVE BLOCK Bilateral 6/16/2020    BILATERAL L3 L4 L5 MEDIAL NERVE BRANCH BLOCK   #1 performed by Anaid Aviles DO at [facility],[address] Bilateral 7/14/2020    BILATERAL L3 4 5 MEDIAL NERVE BRANCH BLOCK # 2 performed by Archana Nolan DO at [address] [place] [address] [place]  11/01/2016    BRONCHOSCOPY  12/14/2016    CHOLECYSTECTOMY  02/25/2016    lap    COLONOSCOPY      ENDOMETRIAL ABLATION      ENDOSCOPY, COLON, DIAGNOSTIC      HYSTERECTOMY (CERVIX STATUS UNKNOWN)      PAIN MANAGEMENT PROCEDURE Left 7/28/2020    LEFT L3 4 5 MEDIAL NERVE BRANCH RADIOFREQUENCY ABLATION performed by Archana Nolan DO at [address] [place] [address] Right 8/27/2020    RIGHT L3 4 5 MEDIAL NERVE BRANCH RADIOFREQUENCY ABLATION performed by Archana Nolan, DO at [address] [place] [address] Right 5/18/2021    RIGHT L3, 4, 5  RADIOFREQUENCY ABLATION performed by Archana Nolan, DO at [address] [place] [address] Left 6/10/2021    LEFT L3, 4, 5  RADIOFREQUENCY ABLATION performed by Archana Nolan,  at [address] [place] [address] Right 6/28/2022    RIGHT L3, L4, L5 RADIOFREQUENCY ABLATION performed by Archana Nolan, DO at [address] [place] [address] Left 7/28/2022    LEFT L3 4 5  RADIOFREQUENCY ABLATION performed by Archana Nolan DO at [address] [place] [address] [place]      related to endometriosis    MARCELINO AND BSO (CERVIX REMOVED)      TONSILLECTOMY         Prior to Admission medications    Medication Sig Start Date End Date Taking?  Authorizing Provider   cetirizine (ZYRTEC) 10 MG tablet Take 1 tablet by mouth daily 1/16/23  Yes Vera Chavez DO   montelukast (SINGULAIR) 10 MG tablet Take 1 tablet by mouth daily 1/16/23  Yes Vera Chavez DO   Galcanezumab-gnlm 120 MG/ML SOAJ Inject 120 mg into the skin every 30 days 12/27/22 2/27/24 Yes Marvel Gaviria MD   albuterol sulfate HFA (VENTOLIN HFA) 108 (90 Base) MCG/ACT inhaler Inhale 1 puff into the lungs every 4 hours as needed for Wheezing 4/12/22  Yes Clance Plan, DO   vitamin D (ERGOCALCIFEROL) 1.25 MG (28767 UT) CAPS capsule Take 1 capsule by mouth once a week 3/25/22  Yes Clance Plan, DO   Handicap Placard MISC by Does not apply route Patient cannot walk 200 ft without stopping to rest.    Expiration 5 yrs 9/23/21  Yes Brenda Strong, DO   Elastic Bandages & Supports (ABDOMINAL BINDER/ELASTIC LARGE) MISC 1 Device by Does not apply route daily 5/12/21  Yes Forrest Wall MD   propranolol (INDERAL) 20 MG tablet Take 1 tablet by mouth 3 times daily  Patient taking differently: Take 80 mg by mouth daily 5/12/21  Yes Forrest Wall MD   Handicap Placard MISC by Does not apply route Patient cannot walk 200 ft without stopping to rest.    Expiration 5 YRS 1/7/21  Yes Brenda Strong DO   cimetidine (TAGAMET) 300 MG tablet Take 300 mg by mouth 2 times daily    Yes Historical Provider, MD   Acetaminophen (TYLENOL PO) Take by mouth   Yes Historical Provider, MD   lidocaine 4 % external patch Place 1 patch onto the skin daily   Yes Historical Provider, MD   folic acid (FOLVITE) 1 MG tablet Take 1 tablet by mouth daily  Patient taking differently: Take 4 mg by mouth daily 7/22/20  Yes Brenda Strong DO   medical marijuana Take by mouth as needed. Transdermal patch   Yes Historical Provider, MD   EMGALITY 120 MG/ML SOSY every 30 days  10/1/19  Yes Historical Provider, MD   VYVANSE 60 MG CAPS Take 60 mg by mouth daily. 8/3/19  Yes Historical Provider, MD   dexlansoprazole (DEXILANT) 60 MG CPDR delayed release capsule Take 1 capsule by mouth daily 12/17/18  Yes Celia Cunningham,    estradiol (ESTRACE) 0.5 MG tablet Take 0.5 mg by mouth daily   Yes Historical Provider, MD   baclofen (LIORESAL) 20 MG tablet Take 20 mg by mouth 3 times daily   Yes Historical Provider, MD   PAROXETINE HCL PO Take 15 mg by mouth nightly    Yes Historical Provider, MD   LORazepam (ATIVAN) 1 MG tablet Take 1 mg by mouth 2 times daily  .  8/14/16  Yes Historical Provider, MD   EPINEPHrine (EPIPEN 2-ZAINAB) 0.3 MG/0.3ML SOAJ injection Inject 0.3 mLs into the muscle once for 1 dose Use as directed for allergic reaction 3/24/22 11/3/22  Jignesh Mahajan, DO   sodium chloride POWD powder Take 1 g by mouth 3 times daily (with meals)  Patient not taking: Reported on 2/8/2023    Historical Provider, MD   Probiotic Product (PROBIOTIC DAILY PO) Take 2 capsules by mouth every morning   Patient not taking: Reported on 2/8/2023    Historical Provider, MD       Allergies   Allergen Reactions    Codeine Hives    Demerol Hives    Morphine Hives     pt states that she has tolerated Dilaudid in the past w/o reaction (5/4/11)    Topiramate      Other reaction(s): Other: See Comments  heart palpitations    Tramadol Other (See Comments)     Other reaction(s):  Other: See Comments  also seizure     Casein Nausea And Vomiting    Eggs Or Egg-Derived Products Nausea And Vomiting    Gluten Meal Nausea And Vomiting    Nsaids      Other reaction(s): GI Upset  H/o ulcers    Peanuts [Peanut Oil] Nausea And Vomiting    Prochlorperazine Edisylate Nausea And Vomiting    Trazodone And Nefazodone Other (See Comments)     Nasal sinus swelling    Whey Nausea And Vomiting       Social History     Socioeconomic History    Marital status:      Spouse name: Not on file    Number of children: Not on file    Years of education: Not on file    Highest education level: Not on file   Occupational History    Not on file   Tobacco Use    Smoking status: Some Days     Packs/day: 0.25     Years: 15.00     Pack years: 3.75     Types: Cigarettes     Start date: 10/28/2004    Smokeless tobacco: Never    Tobacco comments:     smokes 5-10 cigs on some days   Vaping Use    Vaping Use: Never used   Substance and Sexual Activity    Alcohol use: No    Drug use: Yes     Types: Marijuana Toula Ronald)     Comment: medical - transdermal patch - edibles    Sexual activity: Not on file   Other Topics Concern    Not on file   Social History Narrative    Not on file     Social Determinants of Health     Financial Resource Strain: Low Risk     Difficulty of Paying Living Expenses: Not hard at all   Food Insecurity: No Food Insecurity    Worried About Running Out of Food in the Last Year: Never true    Ran Out of Food in the Last Year: Never true   Transportation Needs: Not on file   Physical Activity: Not on file   Stress: Not on file   Social Connections: Not on file   Intimate Partner Violence: Not on file   Housing Stability: Not on file       Family History   Problem Relation Age of Onset    High Blood Pressure Mother     High Blood Pressure Father     High Cholesterol Father     No Known Problems Sister     Breast Cancer Maternal Grandmother        REVIEW OF SYSTEMS:     Mandi denies fever/chills, chest pain, shortness of breath, new bowel or bladder complaints. All other review of systems was negative. PHYSICAL EXAMINATION:      BP (!) 143/92   Pulse 89   Temp 97.3 °F (36.3 °C) (Infrared)   Resp 16   Ht 5' 5\" (1.651 m)   Wt 185 lb (83.9 kg)   SpO2 97%   BMI 30.79 kg/m²     General:      General appearance:pleasant and well-hydrated, in no distress and A & O x3  Build:Overweight  Function:Rises from a seated position with difficulty, mild    HEENT:    Head:normocephalic, atraumatic  Pupils:regular, round, equal  Sclera: icterus absent    Lungs:    Breathing:normal breathing pattern    Abdomen:    Shape:non-distended  Tenderness:none  Guarding:none    Lumbar spine:    Spine inspection:normal  CVA tenderness:No   Palpation:tenderness paravertebral muscles, left, right positive. + pain with facet loading. Range of motion:abnormal mildly in lateral bending, flexion, extension rotation bilateral and is painful.     Musculoskeletal:    Trigger points in Paraveteral:absent bilaterally  SI joint tenderness:positive right, positive left              MARGY test:not done right, not done left  Piriformis tenderness:negative right, negative left  Trochanteric bursa tenderness:negative right, negative left  SLR:negative right, negative left, sitting     Extremities:    Tremors:None bilaterally upper and lower  Range of motion:pain with internal rotation of hips negative. Intact:Yes  Varicose veins:absent   Pulses:present Lt radial  Cyanosis:none  Edema:none x all 4 extremities    Neurological:    Sensory:normal to light touch BLE  Motor:                Right Quadriceps5/5          Left Quadriceps5/5           Right Gastrocnemius5/5    Left Gastrocnemius5/5  Right Ant Tibialis5/5  Left Ant Tibialis5/5    Reflexes:  (not assessed today)  Right Quadriceps reflex2+  Left Quadriceps reflex2+  Right Achilles reflex2+  Left Achilles reflex2+  Gait:normal station, with a quad cane    Dermatology:    Skin:no rashes or lesions noted     Impression:     LBP  Lumbar MRI shows DDD without sig central or foraminal stenosis  Referred by NSGY for procedures  Extensive PMHx - RLS, chronic migraines, seizures, rheumatoid arthritis, POTS, GERD, fibromyalgia, HTN, CHANNING, colitis, and a neuromuscular disorder  Hx SCS TRIAL for endometriosis which was complicated by a MRSA infection tx with PICC line abx  Has tried baclofen, lidocaine patches, and medical THC with moderate relief    RFA's were significantly helpful but relief is wearing off and she would like to have repeat  Recently diagnosed with celiac by endoscopy  She requests script for lidoderm and capsaicin patches      Plan:    Capsaicin and lidoderm patches #30, 5 RF  Urine screen deferred  OARRS report reviewed  failed diclofenac gel due to GI upset  Right then left L3,4,5 RFA with 75% relief (valium for presedation, needs ) - pt would like to repeat  B/l lumbar paraspinal TPI under US guidance with minimal relief thus I do not recommend repeat  Consider b/l SIJ injection prn  PT - completed, neg leg length discrep.   Patient encouraged to stay active and to lose weight  Treatment plan discussed with the patient including medications and procedure side effects     We discussed with the patient that combining opioids, benzodiazepines, alcohol, illicit drugs or sleep aids increases the risk of respiratory depression including death. We discussed that these medications may cause drowsiness, sedation or dizziness and have counseled the patient not to drive or operate machinery. We have discussed that these medications will be prescribed only by one provider. We have discussed with the patient about age related risk factors and have thoroughly discussed the importance of taking these medications as prescribed. The patient verbalizes understanding. Cc:  Referring physician    KIM Lubin.

## 2023-02-08 NOTE — PROGRESS NOTES
Do you currently have any of the following:    Fever: No  Headache:  No  Cough: No  Shortness of breath: No  Exposed to anyone with these symptoms: No         Mandi Merino [facility] the [facility]. Mandi is complaining of pain in her low back. The pain is constant. The pain is described as aching, dull, sharp, and miserable. Pain is rated on her best day at a 3, on her worst day at a 9, and on average at a 5 on the VAS scale. Any procedures since your last visit: No    Pacemaker or defibrillator: No     She is not on NSAIDS and is not on anticoagulation medications. Medication Contract and Consent for Opioid Use Documents Filed        No documents found                    BP (!) 143/92   Pulse 89   Temp 97.3 °F (36.3 °C) (Infrared)   Resp 16   Ht 5' 5\" (1.651 m)   Wt 185 lb (83.9 kg)   SpO2 97%   BMI 30.79 kg/m²      No LMP recorded. Patient has had a hysterectomy.

## 2023-02-23 ENCOUNTER — TELEPHONE (OUTPATIENT)
Dept: PAIN MANAGEMENT | Age: 48
End: 2023-02-23

## 2023-02-23 PROBLEM — M47.816 LUMBAR SPONDYLOSIS: Status: ACTIVE | Noted: 2023-02-23

## 2023-02-23 NOTE — TELEPHONE ENCOUNTER
Call to Kapadia Idris that procedure was approved for 2/28/2023 and that Millerdevaughn should call her a few days before for the pre op call and between 2:00 PM and 4:00 PM  the business day before with the arrival time. Instructed Mandi to hold ibuprofen for 24 hours, naprosyn for 4 days and any aspirin containing products or fish oil for 7 days. Instructed to call office back if any questions. Mandi verbalized understanding.     Electronically signed by Frank Marcano RN on 2/23/2023 at 9:58 AM

## 2023-03-08 ENCOUNTER — TELEPHONE (OUTPATIENT)
Dept: PAIN MANAGEMENT | Age: 48
End: 2023-03-08

## 2023-03-13 NOTE — PROGRESS NOTES
[facility] PAIN MANAGEMENT  INSTRUCTIONS  . .......................................................................................................................................... [x] Parking the day of Surgery is located in the [facility].   Upon entering the door, make immediate right into the surgery reception room    [x]  Bring photo ID and insurance card     [x] You may have a light breakfast day of procedure    [x]  Wear loose comfortable clothing    [x]  Please follow instructions for medications as given per 's office    [] You can expect a call the business day prior to procedure to notify you of your arrival time     [x] Please arrange for     []  Other instructions

## 2023-03-14 ENCOUNTER — HOSPITAL ENCOUNTER (OUTPATIENT)
Dept: GENERAL RADIOLOGY | Age: 48
Setting detail: OUTPATIENT SURGERY
Discharge: HOME | End: 2023-03-16
Attending: PAIN MEDICINE
Payer: MEDICAID

## 2023-03-14 ENCOUNTER — HOSPITAL ENCOUNTER (OUTPATIENT)
Age: 48
Setting detail: OUTPATIENT SURGERY
Discharge: HOME | End: 2023-03-14
Attending: PAIN MEDICINE | Admitting: PAIN MEDICINE
Payer: MEDICAID

## 2023-03-14 VITALS
WEIGHT: 185 LBS | BODY MASS INDEX: 30.82 KG/M2 | RESPIRATION RATE: 16 BRPM | HEIGHT: 65 IN | DIASTOLIC BLOOD PRESSURE: 79 MMHG | SYSTOLIC BLOOD PRESSURE: 145 MMHG | OXYGEN SATURATION: 97 % | TEMPERATURE: 96.9 F | HEART RATE: 84 BPM

## 2023-03-14 DIAGNOSIS — R52 PAIN MANAGEMENT: ICD-10-CM

## 2023-03-14 PROCEDURE — 7100000011 HC PHASE II RECOVERY - ADDTL 15 MIN: Performed by: PAIN MEDICINE

## 2023-03-14 PROCEDURE — 2709999900 HC NON-CHARGEABLE SUPPLY: Performed by: PAIN MEDICINE

## 2023-03-14 PROCEDURE — 3209999900 FLUORO FOR SURGICAL PROCEDURES

## 2023-03-14 PROCEDURE — 6360000002 HC RX W HCPCS: Performed by: PAIN MEDICINE

## 2023-03-14 PROCEDURE — 2500000003 HC RX 250 WO HCPCS: Performed by: PAIN MEDICINE

## 2023-03-14 PROCEDURE — 7100000010 HC PHASE II RECOVERY - FIRST 15 MIN: Performed by: PAIN MEDICINE

## 2023-03-14 PROCEDURE — 64636 DESTROY L/S FACET JNT ADDL: CPT | Performed by: PAIN MEDICINE

## 2023-03-14 PROCEDURE — 3600000012 HC SURGERY LEVEL 2 ADDTL 15MIN: Performed by: PAIN MEDICINE

## 2023-03-14 PROCEDURE — 64635 DESTROY LUMB/SAC FACET JNT: CPT | Performed by: PAIN MEDICINE

## 2023-03-14 PROCEDURE — 3600000002 HC SURGERY LEVEL 2 BASE: Performed by: PAIN MEDICINE

## 2023-03-14 RX ORDER — BUPIVACAINE HYDROCHLORIDE 2.5 MG/ML
INJECTION, SOLUTION EPIDURAL; INFILTRATION; INTRACAUDAL PRN
Status: DISCONTINUED | OUTPATIENT
Start: 2023-03-14 | End: 2023-03-14 | Stop reason: ALTCHOICE

## 2023-03-14 RX ORDER — METHYLPREDNISOLONE ACETATE 40 MG/ML
INJECTION, SUSPENSION INTRA-ARTICULAR; INTRALESIONAL; INTRAMUSCULAR; SOFT TISSUE PRN
Status: DISCONTINUED | OUTPATIENT
Start: 2023-03-14 | End: 2023-03-14 | Stop reason: ALTCHOICE

## 2023-03-14 RX ORDER — LIDOCAINE HYDROCHLORIDE 20 MG/ML
INJECTION, SOLUTION EPIDURAL; INFILTRATION; INTRACAUDAL; PERINEURAL PRN
Status: DISCONTINUED | OUTPATIENT
Start: 2023-03-14 | End: 2023-03-14 | Stop reason: ALTCHOICE

## 2023-03-14 ASSESSMENT — PAIN SCALES - GENERAL
PAINLEVEL_OUTOF10: 0

## 2023-03-14 ASSESSMENT — PAIN - FUNCTIONAL ASSESSMENT: PAIN_FUNCTIONAL_ASSESSMENT: 0-10

## 2023-03-14 ASSESSMENT — PAIN DESCRIPTION - DESCRIPTORS: DESCRIPTORS: ACHING;SHARP

## 2023-03-14 NOTE — H&P
[facility] [place] - BEHAVIORAL HEALTH SERVICES Pain Management        [address] [facility], [facility] [address] [address] [facility]  Dept: [phone]    Procedure History & Physical      Kasia Williamson     HPI:    Patient  is here for left low back pain for left L3, L4, L5 RFA  Labs/imaging studies reviewed   All question and concerns addressed including R/B/A associated with the procedure    Past Medical History:   Diagnosis Date    Anxiety     Anxiety, generalized 11/03/2022    Arthritis     Asthma exacerbation with COPD (chronic obstructive pulmonary disease) ([address] 2016    Celiac disease     Christina-Danlos syndrome     connective tissue syndrome    Essential hypertension     Fibromyalgia     GERD (gastroesophageal reflux disease)     Major depression     Malabsorption syndrome     POTS (postural orthostatic tachycardia syndrome)     PTSD (post-traumatic stress disorder)     Rheumatoid arthritis ([facility] [address]     Seizures ([facility] [address] 2005    due to Ultram     Somatic dysfunction of cervical region 11/03/2022    Sx of RLS (restless legs syndrome)        Past Surgical History:   Procedure Laterality Date    ABDOMEN SURGERY  03/05/2012    endometreosis    ANESTHESIA NERVE BLOCK Bilateral 06/16/2020    BILATERAL L3 L4 L5 MEDIAL NERVE BRANCH BLOCK   #1 performed by Atiya Gilbert DO at [address] [place] [address],[address] Bilateral 07/14/2020    BILATERAL L3 4 5 MEDIAL NERVE BRANCH BLOCK # 2 performed by Atiya Gilbert DO at [address] [place] [address]  11/01/2016    BRONCHOSCOPY  12/14/2016    CHOLECYSTECTOMY  02/25/2016    lap    COLONOSCOPY      COLONOSCOPY  12/2022    ENDOMETRIAL ABLATION      ENDOSCOPY, COLON, DIAGNOSTIC      ESOPHAGOGASTRODUODENOSCOPY  12/2022    HYSTERECTOMY (CERVIX STATUS UNKNOWN)      PAIN MANAGEMENT PROCEDURE Left 07/28/2020    LEFT L3 4 5 MEDIAL NERVE BRANCH RADIOFREQUENCY ABLATION performed by Atiya Gilbert DO at [address] [place] [address] Right 08/27/2020    RIGHT L3 4 5 MEDIAL NERVE BRANCH RADIOFREQUENCY ABLATION performed by Kathy An, DO at [address] Right 05/18/2021    RIGHT L3, 4, 5  RADIOFREQUENCY ABLATION performed by Kathy An, DO at [address] Left 06/10/2021    LEFT L3, 4, 5  RADIOFREQUENCY ABLATION performed by Kathy An, DO at [address] Right 06/28/2022    RIGHT L3, L4, L5 RADIOFREQUENCY ABLATION performed by Kahty An, DO at [address] Left 07/28/2022    LEFT L3 4 5  RADIOFREQUENCY ABLATION performed by Kathy Wong, DO at [address] [place] [address] [place]      related to endometriosis    MARCELINO AND BSO (CERVIX REMOVED)      TONSILLECTOMY         Prior to Admission medications    Medication Sig Start Date End Date Taking?  Authorizing Provider   diazePAM (VALIUM) 10 MG tablet Take one tab one hour prior to the procedure 2/27/23 3/29/23  Kathy Wong DO   cetirizine (ZYRTEC) 10 MG tablet Take 1 tablet by mouth daily 1/16/23   Elise Curtis DO   montelukast (SINGULAIR) 10 MG tablet Take 1 tablet by mouth daily 1/16/23   Elise Curtis DO   Galcanezumab-gnlm 120 MG/ML SOAJ Inject 120 mg into the skin every 30 days 12/27/22 2/27/24  Shawna Trujillo MD   albuterol sulfate HFA (VENTOLIN HFA) 108 (90 Base) MCG/ACT inhaler Inhale 1 puff into the lungs every 4 hours as needed for Wheezing 4/12/22   Panfilo Garcia DO   vitamin D (ERGOCALCIFEROL) 1.25 MG (37335 UT) CAPS capsule Take 1 capsule by mouth once a week 3/25/22   Panfilo Garcia DO   EPINEPHrine (EPIPEN 2-ZAINAB) 0.3 MG/0.3ML SOAJ injection Inject 0.3 mLs into the muscle once for 1 dose Use as directed for allergic reaction 3/24/22 11/3/22  Panfilo Garcia DO   Handicap Placard MISC by Does not apply route Patient cannot walk 200 ft without stopping to rest.    Expiration 5 yrs 9/23/21   Panfilo Garcia DO   Elastic Bandages & Supports (ABDOMINAL BINDER/ELASTIC LARGE) MISC 1 Device by Does not apply route daily 5/12/21   Linda Walker, MD   propranolol (INDERAL) 20 MG tablet Take 1 tablet by mouth 3 times daily  Patient taking differently: Take 80 mg by mouth daily 5/12/21   Garry Sicard Mezu-Chukwu, MD   Handdoug Logan MISC by Does not apply route Patient cannot walk 200 ft without stopping to rest.    Expiration 5 YRS 1/7/21   Jignesh Marcano, DO   cimetidine (TAGAMET) 300 MG tablet Take 300 mg by mouth 2 times daily     Historical Provider, MD   Acetaminophen (TYLENOL PO) Take by mouth    Historical Provider, MD   folic acid (FOLVITE) 1 MG tablet Take 1 tablet by mouth daily  Patient taking differently: Take 4 mg by mouth daily 7/22/20   Rafi Weston, DO   medical marijuana Take by mouth as needed. Transdermal patch    Historical Provider, MD   VYVANSE 60 MG CAPS Take 60 mg by mouth daily. 8/3/19   Historical Provider, MD   dexlansoprazole (DEXILANT) 60 MG CPDR delayed release capsule Take 1 capsule by mouth daily 12/17/18   Chiquis Rangel,    baclofen (LIORESAL) 20 MG tablet Take 20 mg by mouth 3 times daily    Historical Provider, MD   PAROXETINE HCL PO Take 10 mg by mouth nightly    Historical Provider, MD   LORazepam (ATIVAN) 1 MG tablet Take 1 mg by mouth daily as needed. 8/14/16   Historical Provider, MD       Allergies   Allergen Reactions    Peanuts [Peanut Oil] Hives and Itching    Codeine Hives    Demerol Hives    Morphine Hives     pt states that she has tolerated Dilaudid in the past w/o reaction (5/4/11)    Topiramate      Other reaction(s): Other: See Comments  heart palpitations    Tramadol Other (See Comments)     Other reaction(s):  Other: See Comments  also seizure     Casein Nausea And Vomiting    Eggs Or Egg-Derived Products Nausea And Vomiting    Gluten Meal Nausea And Vomiting    Nsaids      Other reaction(s): GI Upset  H/o ulcers    Prochlorperazine Edisylate Nausea And Vomiting and Myalgia    Trazodone And Nefazodone Other (See Comments)     Nasal sinus swelling    Whey Nausea And Vomiting       Social History Socioeconomic History    Marital status:      Spouse name: Not on file    Number of children: Not on file    Years of education: Not on file    Highest education level: Not on file   Occupational History    Not on file   Tobacco Use    Smoking status: Some Days     Packs/day: 0.25     Years: 15.00     Pack years: 3.75     Types: Cigarettes     Start date: 10/28/2004    Smokeless tobacco: Never    Tobacco comments:     smokes 5-10 cigs on some days   Vaping Use    Vaping Use: Never used   Substance and Sexual Activity    Alcohol use: No    Drug use: Yes     Types: Marijuana Newborn Mendoza)     Comment: medical - transdermal patch - edibles    Sexual activity: Not on file   Other Topics Concern    Not on file   Social History Narrative    Not on file     Social Determinants of Health     Financial Resource Strain: Low Risk     Difficulty of Paying Living Expenses: Not hard at all   Food Insecurity: No Food Insecurity    Worried About Running Out of Food in the Last Year: Never true    Ran Out of Food in the Last Year: Never true   Transportation Needs: Not on file   Physical Activity: Not on file   Stress: Not on file   Social Connections: Not on file   Intimate Partner Violence: Not on file   Housing Stability: Not on file       Family History   Problem Relation Age of Onset    High Blood Pressure Mother     High Blood Pressure Father     High Cholesterol Father     No Known Problems Sister     Breast Cancer Maternal Grandmother          REVIEW OF SYSTEMS:    CONSTITUTIONAL:  negative for  fevers, chills, sweats and fatigue    RESPIRATORY:  negative for  dry cough, cough with sputum, dyspnea, wheezing and chest pain    CARDIOVASCULAR:  negative for chest pain, dyspnea, palpitations, syncope    GASTROINTESTINAL:  negative for nausea, vomiting, change in bowel habits, diarrhea, constipation and abdominal pain    MUSCULOSKELETAL: negative for muscle weakness    SKIN: negative for itching or rashes.     BEHAVIOR/PSYCH: negative for poor appetite, increased appetite, decreased sleep and poor concentration    All other systems negative      PHYSICAL EXAM:    VITALS:  /89   Pulse 92   Temp (!) 96.2 °F (35.7 °C) (Temporal)   Resp 20   Ht 5' 5\" (1.651 m)   Wt 185 lb (83.9 kg)   SpO2 95%   BMI 30.79 kg/m²     CONSTITUTIONAL:  awake, alert, cooperative, no apparent distress, and appears stated age    EYES: PERRLA, EOMI    LUNGS:  No increased work of breathing, no audible wheezing    CARDIOVASCULAR:  regular rate and rhythm    ABDOMEN:  Soft non tender non distended     EXTREMITIES: no signs of clubbing or cyanosis. MUSCULOSKELETAL: negative for flaccid muscle tone or spastic movements. SKIN: gross examination reveals no signs of rashes, or diaphoresis. NEURO: Cranial nerves II-XII grossly intact. No signs of agitated mood.        Assessment/Plan:    Left low back pain for left L3, L4, L5 RFA

## 2023-03-14 NOTE — OP NOTE
3/14/2023    Patient: Pearl Pitts  :  1975  Age:  41 y.o. Sex:  female     PRE-OPERATIVE DIAGNOSIS: Left Lumbar spondylosis, lumbar facet arthropathy. POST-OPERATIVE DIAGNOSIS: Same. PROCEDURE:  Fluoroscopic-guided Left  Lumbar facet medial branch radiofrequency ablation at levels L3,4,5 (anesthetizing the L4-5 and L5-S1 facet joints). SURGEON:  KIM Cortes. ANESTHESIA: local    ESTIMATED BLOOD LOSS: None.  ______________________________________________________________________  HISTORY & PHYSICAL: Pearl Pitts presents today for fluoroscopic-guided Left lumbar facet medial branch radiofrequency ablation at levels L3,4,5. The potential complications of the procedure were explained to [facility] again today and she has elected to undergo the aforementioned procedure. [facility] complete History & Physical examination were reviewed in depth, a copy of which is in the chart. DESCRIPTION OF PROCEDURE:    After confirming written and informed consent, a time-out was performed and [facility] name and date of birth, the procedure to be performed as well as the plan for the location of the needle insertion were confirmed. The patient was brought into the procedure room and placed in the prone position on the fluoroscopy table. Standard monitors were placed and vital signs were observed throughout the procedure. The area of the lumbar spine and upper buttocks was sterilely prepped with chloraprep and draped in a sterile manner. AP fluoroscopy was used to identify and cordelia bartons point at the targeted area. A 22 gauge 10 mm radiofrequency probe was advanced toward each of these points. Once bone was contacted, negative aspiration for blood and CSF was confirmed, sensory stimulation was performed at 50 Hz and at 0.4 volts generating a pressure sensation. Motor stimulation < 2.0 volts elicited multifidus twitching without any radicular symptoms.  1 ml of 2% lidocaine was injected prior to lesioning, which was performed for 90 seconds at 90 degrees centigrade. Once the lesions were complete, a solution of 0.25% marcaine 3 cc and 40 mg DepoMedrol was injected and distributed equally through each probe. The probes were removed . The patient's back was cleaned and bandages were placed over the needle insertion sites. Disposition the patient tolerated the procedure well and there were no complications . Vital signs remained stable throughout the procedure. The patient was escorted to the recovery area where they remained until discharge and written discharge instructions for the procedure were given. Plan: Fredrick Oro will return to our pain management center as scheduled.      Carolin Garvin DO

## 2023-03-14 NOTE — PROGRESS NOTES
Discharge instructions gone over, follow up discussed. Pt verbalized understanding of discharge instructions, all questions answered. Pt discharged home with family via [facility] with this nurse.

## 2023-03-14 NOTE — DISCHARGE INSTRUCTIONS
Leigh Ann Wheeler Block/Radiofrequency  Home Going Instructions    1-Go home, rest for the remainder of the day  2-Please do not lift over 20 pounds the day of the injection  3-If you received sedation No: alcohol, driving, operating lawn mowers, plows, tractors or other dangerous equipment until next morning. Do not make important decisions or sign legal documents for 24 hours. You may experience light headedness, dizziness, nausea or sleepiness after sedation. Do not stay alone. A responsible adult must be with you for 24 hours. You could be nauseated from the medications you have received. Your IV site may be sore and bruised. 4-No dietary restrictions     5-Resume all medications the same day, blood thinners to be resumed 24 hours after injection if you were instructed to stop any. 6-Keep the surgical site clean and dry, you may shower the next morning and remove the      dressing. 7- No sitz baths, tub baths or hot tubs/swimming for 24 hours. 8- If you have any pain at the injection site(s), application of an ice pack to the area should be       helpful, 20 minutes on/20 minutes off for next 48 hours. 9- Call [facility] immediately at if you develop.   Fever greater than 100.4 F  Have bleeding or drainage from the puncture site  Have progressive Leg/arm numbness and or weakness  Loss of control of bowel and or bladder (wet/soil yourself)  Severe headache with inability to lift head  10-You may return to work the next day

## 2023-03-16 ENCOUNTER — TELEPHONE (OUTPATIENT)
Dept: PAIN MANAGEMENT | Age: 48
End: 2023-03-16

## 2023-03-16 NOTE — TELEPHONE ENCOUNTER
Mandi Merino called said she has an appointment on 3/24/2023. She is wondering if that visit could be virtual as she depends on her  to get her to work and he is not available to bring her that day. Please advise.

## 2023-03-23 NOTE — PROGRESS NOTES
[facility] PAIN MANAGEMENT  INSTRUCTIONS  . .......................................................................................................................................... [x] Parking the day of Surgery is located in the [facility].   Upon entering the door, make immediate right into the surgery reception room    [x]  Bring photo ID and insurance card     [x] You may have a light breakfast day of procedure    [x]  Wear loose comfortable clothing    [x]  Please follow instructions for medications as given per Dr's office    [x] You can expect a call the business day prior to procedure to notify you of your arrival time     [x] Please arrange for     []  Other instructions

## 2023-03-24 DIAGNOSIS — M54.50 CHRONIC BILATERAL LOW BACK PAIN WITHOUT SCIATICA: ICD-10-CM

## 2023-03-24 DIAGNOSIS — G89.4 CHRONIC PAIN SYNDROME: ICD-10-CM

## 2023-03-24 DIAGNOSIS — M47.817 LUMBOSACRAL SPONDYLOSIS WITHOUT MYELOPATHY: ICD-10-CM

## 2023-03-24 DIAGNOSIS — G89.29 CHRONIC BILATERAL LOW BACK PAIN WITHOUT SCIATICA: ICD-10-CM

## 2023-03-24 RX ORDER — DIAZEPAM 10 MG/1
TABLET ORAL
Qty: 1 TABLET | Refills: 0 | OUTPATIENT
Start: 2023-03-24 | End: 2023-04-23

## 2023-03-27 RX ORDER — DIAZEPAM 10 MG/1
TABLET ORAL
Qty: 1 TABLET | Refills: 0 | OUTPATIENT
Start: 2023-03-27 | End: 2023-04-26

## 2023-03-28 ENCOUNTER — HOSPITAL ENCOUNTER (OUTPATIENT)
Age: 48
Setting detail: OUTPATIENT SURGERY
Discharge: HOME | End: 2023-03-28
Attending: PAIN MEDICINE | Admitting: PAIN MEDICINE
Payer: MEDICAID

## 2023-03-28 ENCOUNTER — HOSPITAL ENCOUNTER (OUTPATIENT)
Dept: GENERAL RADIOLOGY | Age: 48
Setting detail: OUTPATIENT SURGERY
Discharge: HOME | End: 2023-03-30
Attending: PAIN MEDICINE
Payer: MEDICAID

## 2023-03-28 VITALS
DIASTOLIC BLOOD PRESSURE: 88 MMHG | SYSTOLIC BLOOD PRESSURE: 146 MMHG | OXYGEN SATURATION: 97 % | TEMPERATURE: 97.8 F | BODY MASS INDEX: 30.82 KG/M2 | RESPIRATION RATE: 18 BRPM | WEIGHT: 185 LBS | HEART RATE: 81 BPM | HEIGHT: 65 IN

## 2023-03-28 DIAGNOSIS — R52 PAIN MANAGEMENT: ICD-10-CM

## 2023-03-28 PROCEDURE — 6360000002 HC RX W HCPCS: Performed by: PAIN MEDICINE

## 2023-03-28 PROCEDURE — 3209999900 FLUORO FOR SURGICAL PROCEDURES

## 2023-03-28 PROCEDURE — 64636 DESTROY L/S FACET JNT ADDL: CPT | Performed by: PAIN MEDICINE

## 2023-03-28 PROCEDURE — 2709999900 HC NON-CHARGEABLE SUPPLY: Performed by: PAIN MEDICINE

## 2023-03-28 PROCEDURE — 2500000003 HC RX 250 WO HCPCS: Performed by: PAIN MEDICINE

## 2023-03-28 PROCEDURE — 6370000000 HC RX 637 (ALT 250 FOR IP): Performed by: PAIN MEDICINE

## 2023-03-28 PROCEDURE — 7100000010 HC PHASE II RECOVERY - FIRST 15 MIN: Performed by: PAIN MEDICINE

## 2023-03-28 PROCEDURE — 3600000002 HC SURGERY LEVEL 2 BASE: Performed by: PAIN MEDICINE

## 2023-03-28 PROCEDURE — 7100000011 HC PHASE II RECOVERY - ADDTL 15 MIN: Performed by: PAIN MEDICINE

## 2023-03-28 PROCEDURE — 3600000012 HC SURGERY LEVEL 2 ADDTL 15MIN: Performed by: PAIN MEDICINE

## 2023-03-28 PROCEDURE — 64635 DESTROY LUMB/SAC FACET JNT: CPT | Performed by: PAIN MEDICINE

## 2023-03-28 RX ORDER — BUPIVACAINE HYDROCHLORIDE 2.5 MG/ML
INJECTION, SOLUTION EPIDURAL; INFILTRATION; INTRACAUDAL PRN
Status: DISCONTINUED | OUTPATIENT
Start: 2023-03-28 | End: 2023-03-28 | Stop reason: ALTCHOICE

## 2023-03-28 RX ORDER — HYDROCODONE BITARTRATE AND ACETAMINOPHEN 5; 325 MG/1; MG/1
1 TABLET ORAL ONCE
Status: COMPLETED | OUTPATIENT
Start: 2023-03-28 | End: 2023-03-28

## 2023-03-28 RX ORDER — LIDOCAINE HYDROCHLORIDE 20 MG/ML
INJECTION, SOLUTION EPIDURAL; INFILTRATION; INTRACAUDAL; PERINEURAL PRN
Status: DISCONTINUED | OUTPATIENT
Start: 2023-03-28 | End: 2023-03-28 | Stop reason: ALTCHOICE

## 2023-03-28 RX ORDER — METHYLPREDNISOLONE ACETATE 40 MG/ML
INJECTION, SUSPENSION INTRA-ARTICULAR; INTRALESIONAL; INTRAMUSCULAR; SOFT TISSUE PRN
Status: DISCONTINUED | OUTPATIENT
Start: 2023-03-28 | End: 2023-03-28 | Stop reason: ALTCHOICE

## 2023-03-28 RX ADMIN — HYDROCODONE BITARTRATE AND ACETAMINOPHEN 1 TABLET: 5; 325 TABLET ORAL at 12:01

## 2023-03-28 ASSESSMENT — PAIN DESCRIPTION - ORIENTATION
ORIENTATION: LOWER

## 2023-03-28 ASSESSMENT — PAIN DESCRIPTION - LOCATION
LOCATION: BACK

## 2023-03-28 ASSESSMENT — PAIN DESCRIPTION - PAIN TYPE
TYPE: SURGICAL PAIN

## 2023-03-28 ASSESSMENT — PAIN DESCRIPTION - DESCRIPTORS
DESCRIPTORS: ACHING;DISCOMFORT
DESCRIPTORS: ACHING;DISCOMFORT;SHARP
DESCRIPTORS: ACHING;DISCOMFORT;SORE
DESCRIPTORS: ACHING;DISCOMFORT;SHARP
DESCRIPTORS: ACHING
DESCRIPTORS: ACHING;DISCOMFORT

## 2023-03-28 ASSESSMENT — PAIN SCALES - GENERAL
PAINLEVEL_OUTOF10: 8
PAINLEVEL_OUTOF10: 8
PAINLEVEL_OUTOF10: 10
PAINLEVEL_OUTOF10: 5
PAINLEVEL_OUTOF10: 7
PAINLEVEL_OUTOF10: 10
PAINLEVEL_OUTOF10: 8

## 2023-03-28 ASSESSMENT — PAIN - FUNCTIONAL ASSESSMENT: PAIN_FUNCTIONAL_ASSESSMENT: 0-10

## 2023-03-28 NOTE — OP NOTE
which was performed for 90 seconds at 90 degrees centigrade. Once the lesions were complete, a solution of 0.25% marcaine 3 cc and 40 mg DepoMedrol was injected and distributed equally through each probe. The probes were removed . The patient's back was cleaned and bandages were placed over the needle insertion sites. Disposition the patient tolerated the procedure well and there were no complications . Vital signs remained stable throughout the procedure. The patient was escorted to the recovery area where they remained until discharge and written discharge instructions for the procedure were given. Plan: Durell Shone will return to our pain management center as scheduled.      Celia Jo DO

## 2023-03-28 NOTE — H&P
decreased sleep and poor concentration    All other systems negative      PHYSICAL EXAM:    VITALS:  BP (!) 144/84   Pulse 91   Temp 97.8 °F (36.6 °C) (Temporal)   Resp 18   Ht 5' 5\" (1.651 m)   Wt 185 lb (83.9 kg)   SpO2 98%   BMI 30.79 kg/m²     CONSTITUTIONAL:  awake, alert, cooperative, no apparent distress, and appears stated age    EYES: PERRLA, EOMI    LUNGS:  No increased work of breathing, no audible wheezing    CARDIOVASCULAR:  regular rate and rhythm    ABDOMEN:  Soft non tender non distended     EXTREMITIES: no signs of clubbing or cyanosis. MUSCULOSKELETAL: negative for flaccid muscle tone or spastic movements. SKIN: gross examination reveals no signs of rashes, or diaphoresis. NEURO: Cranial nerves II-XII grossly intact. No signs of agitated mood.        Assessment/Plan:    Right LB pain for right L3,4,5 RFA

## 2023-03-28 NOTE — DISCHARGE INSTRUCTIONS
Sigrid Beltre Core Block/Radiofrequency  Home Going Instructions    1-Go home, rest for the remainder of the day  2-Please do not lift over 20 pounds the day of the injection  3-If you received sedation No: alcohol, driving, operating lawn mowers, plows, tractors or other dangerous equipment until next morning. Do not make important decisions or sign legal documents for 24 hours. You may experience light headedness, dizziness, nausea or sleepiness after sedation. Do not stay alone. A responsible adult must be with you for 24 hours. You could be nauseated from the medications you have received. Your IV site may be sore and bruised. 4-No dietary restrictions     5-Resume all medications the same day, blood thinners to be resumed 24 hours after injection if you were instructed to stop any. 6-Keep the surgical site clean and dry, you may shower the next morning and remove the      dressing. 7- No sitz baths, tub baths or hot tubs/swimming for 24 hours. 8- If you have any pain at the injection site(s), application of an ice pack to the area should be       helpful, 20 minutes on/20 minutes off for next 48 hours. 9- Call [facility] immediately at if you develop.   Fever greater than 100.4 F  Have bleeding or drainage from the puncture site  Have progressive Leg/arm numbness and or weakness  Loss of control of bowel and or bladder (wet/soil yourself)  Severe headache with inability to lift head  10-You may return to work the next day

## 2023-04-17 ENCOUNTER — TELEPHONE (OUTPATIENT)
Dept: PRIMARY CARE CLINIC | Age: 48
End: 2023-04-17

## 2023-04-17 RX ORDER — AMOXICILLIN 875 MG/1
875 TABLET, COATED ORAL 2 TIMES DAILY
Qty: 20 TABLET | Refills: 0 | Status: SHIPPED | OUTPATIENT
Start: 2023-04-17 | End: 2023-04-27

## 2023-04-17 NOTE — TELEPHONE ENCOUNTER
Patient calling she cracked at tooth cannot see dentist until Thursday concerned about infection. Asking if you can order antibiotic. States she does not do well with cipro.

## 2023-04-21 NOTE — TELEPHONE ENCOUNTER
Patient calling. Amoxil is helping for her infected tooth, but really wanting to just get the tooth pulled. Called dental clinics in the area and they are scheduling in Aug, Sept. Went to see her current dentist, Dr. Xiomara Mclean at [facility] and was told they are not able to pull it due to her medical history. Was referred to [facility] by her dentist, but their office has dropped the ball on filling out the referral form correctly several times. [facility] with no luck. Spoke with her previous dentist,  Thee alvarez (found out she was discharged as a patient). Left message with Dr. Mesha Wilson, left message with [facility], also called  with homecare medicaid waiver to see if she can help in any way, but also had to leave a message. Patient is asking if you have other suggestions for her on what to do. Has a history of not doing well with infections.

## 2023-04-27 ENCOUNTER — OFFICE VISIT (OUTPATIENT)
Dept: ENDOCRINOLOGY | Age: 48
End: 2023-04-27
Payer: MEDICAID

## 2023-04-27 VITALS
HEIGHT: 65 IN | DIASTOLIC BLOOD PRESSURE: 81 MMHG | BODY MASS INDEX: 32.99 KG/M2 | HEART RATE: 106 BPM | SYSTOLIC BLOOD PRESSURE: 139 MMHG | OXYGEN SATURATION: 99 % | WEIGHT: 198 LBS | RESPIRATION RATE: 18 BRPM

## 2023-04-27 DIAGNOSIS — M81.0 OSTEOPOROSIS WITHOUT CURRENT PATHOLOGICAL FRACTURE, UNSPECIFIED OSTEOPOROSIS TYPE: ICD-10-CM

## 2023-04-27 DIAGNOSIS — E55.9 VITAMIN D DEFICIENCY: ICD-10-CM

## 2023-04-27 DIAGNOSIS — M81.0 OSTEOPOROSIS WITHOUT CURRENT PATHOLOGICAL FRACTURE, UNSPECIFIED OSTEOPOROSIS TYPE: Primary | ICD-10-CM

## 2023-04-27 LAB
ALBUMIN SERPL-MCNC: 4.9 G/DL (ref 3.5–5.2)
ALP SERPL-CCNC: 69 U/L (ref 35–104)
ALT SERPL-CCNC: 34 U/L (ref 0–32)
ANION GAP SERPL CALCULATED.3IONS-SCNC: 18 MMOL/L (ref 7–16)
AST SERPL-CCNC: 36 U/L (ref 0–31)
BILIRUB SERPL-MCNC: 0.3 MG/DL (ref 0–1.2)
BUN SERPL-MCNC: 11 MG/DL (ref 6–20)
CALCIUM SERPL-MCNC: 10.3 MG/DL (ref 8.6–10.2)
CHLORIDE SERPL-SCNC: 101 MMOL/L (ref 98–107)
CO2 SERPL-SCNC: 22 MMOL/L (ref 22–29)
CREAT SERPL-MCNC: 0.8 MG/DL (ref 0.5–1)
GLUCOSE SERPL-MCNC: 88 MG/DL (ref 74–99)
POTASSIUM SERPL-SCNC: 4.1 MMOL/L (ref 3.5–5)
PROT SERPL-MCNC: 8.1 G/DL (ref 6.4–8.3)
SODIUM SERPL-SCNC: 141 MMOL/L (ref 132–146)
VITAMIN D 25-HYDROXY: 14 NG/ML (ref 30–100)

## 2023-04-27 PROCEDURE — 3079F DIAST BP 80-89 MM HG: CPT | Performed by: CLINICAL NURSE SPECIALIST

## 2023-04-27 PROCEDURE — 3075F SYST BP GE 130 - 139MM HG: CPT | Performed by: CLINICAL NURSE SPECIALIST

## 2023-04-27 PROCEDURE — 99205 OFFICE O/P NEW HI 60 MIN: CPT | Performed by: CLINICAL NURSE SPECIALIST

## 2023-04-27 RX ORDER — PHENOL 1.4 %
2 AEROSOL, SPRAY (ML) MUCOUS MEMBRANE DAILY
Qty: 60 TABLET | Refills: 11 | Status: SHIPPED | OUTPATIENT
Start: 2023-04-27

## 2023-04-27 RX ORDER — DENOSUMAB 60 MG/ML
60 INJECTION SUBCUTANEOUS ONCE
Qty: 180 ML | Refills: 1 | Status: SHIPPED | OUTPATIENT
Start: 2023-04-27 | End: 2023-04-27

## 2023-04-27 NOTE — PROGRESS NOTES
[address] [place] [address] Department of Endocrinology Diabetes and Metabolism   [address] [facility] [address]   Phone: [phone]  Fax: [phone]    Date of Service: 4/27/2023    Primary Care Physician: Sushil Gonzalez DO  Referring physician: Tamara Wilkins DO  Provider: JESSE Garcia     Reason for the visit:      History of Present Illness: The history is provided by the patient. No  was used. Accuracy of the patient data is excellent. Elham Avila is a very pleasant 41 y.o. female seen today for diabetes management     Osteoporosis:   The osteoporosis was first discovered dx at the age of 38 . The diagnosis was made after routine screening DEXA. Risk factors for osteoporosis in the patient are history of the following postmenopausal estrogen deficiency or dietary calcium and/or vitamin D deficiency. The patient's fracture history is none. Medications that the patient is using include attempted fosamax in the past but could not tolerate. Has barretts esphogus  . The last DEXA (12/22) result reveals lumbar spine T score: -2.9, femoral neck T score:-2.5, total hip T score: -2.8. Side effects of the medications include n/a. Compliance with the medical regimen has been n/a. Recently diagnosed with celiac disease  Hx of hysterectomy age of 29. No ovaries   She is currently on Drisdol 19400 IU once weekly    No recent calcium supplement but does have adequate   Previously on prednisone on off for several years   She denied any h/o fragility fractures or recent fall   Pt has developed kidney stones but none over 10 years   The patient diet doesn't include any dairy products, except occasional cheese   Sun light exposure.    Denies hyperthyroidism  Denies hyperparathyroidism    PAST MEDICAL HISTORY   Past Medical History:   Diagnosis Date    Anxiety     Anxiety, generalized 11/03/2022    Arthritis     Asthma exacerbation with COPD (chronic

## 2023-05-01 RX ORDER — ERGOCALCIFEROL 1.25 MG/1
CAPSULE ORAL
Qty: 24 CAPSULE | Refills: 1 | Status: SHIPPED | OUTPATIENT
Start: 2023-05-01

## 2023-05-03 DIAGNOSIS — M81.8 OTHER OSTEOPOROSIS WITHOUT CURRENT PATHOLOGICAL FRACTURE: ICD-10-CM

## 2023-05-04 ENCOUNTER — TELEPHONE (OUTPATIENT)
Dept: ENDOCRINOLOGY | Age: 48
End: 2023-05-04

## 2023-05-04 NOTE — TELEPHONE ENCOUNTER
----- Message from JESSE Orr sent at 5/1/2023  8:34 AM EDT -----  Please call patient and inform her vitamin D remains low. If patient is taking Drisdol 50,000 IU once weekly please have her increase dose to take it twice weekly. Patient can take 1 tablet on Monday and 1 tablet on Thursday.   Rx sent

## 2023-05-08 ENCOUNTER — TELEPHONE (OUTPATIENT)
Dept: PRIMARY CARE CLINIC | Age: 48
End: 2023-05-08

## 2023-05-08 RX ORDER — AMOXICILLIN 875 MG/1
875 TABLET, COATED ORAL 2 TIMES DAILY
Qty: 20 TABLET | Refills: 0 | Status: SHIPPED | OUTPATIENT
Start: 2023-05-08 | End: 2023-05-18

## 2023-05-08 NOTE — TELEPHONE ENCOUNTER
Patient calling she finished antibiotic for dental pain/infection 4/27 she cannot see dentist until 5/19 having pain again asking if you could order another antibiotic.

## 2023-05-18 ENCOUNTER — APPOINTMENT (OUTPATIENT)
Dept: GENERAL RADIOLOGY | Age: 48
End: 2023-05-18
Payer: MEDICAID

## 2023-05-18 ENCOUNTER — HOSPITAL ENCOUNTER (OUTPATIENT)
Dept: INFUSION THERAPY | Age: 48
Setting detail: INFUSION SERIES
End: 2023-05-18

## 2023-05-18 ENCOUNTER — HOSPITAL ENCOUNTER (EMERGENCY)
Age: 48
Discharge: HOME | End: 2023-05-19
Attending: EMERGENCY MEDICINE
Payer: MEDICAID

## 2023-05-18 VITALS
HEART RATE: 66 BPM | RESPIRATION RATE: 16 BRPM | TEMPERATURE: 97.7 F | OXYGEN SATURATION: 98 % | SYSTOLIC BLOOD PRESSURE: 118 MMHG | DIASTOLIC BLOOD PRESSURE: 84 MMHG

## 2023-05-18 DIAGNOSIS — K08.89 PAIN, DENTAL: Primary | ICD-10-CM

## 2023-05-18 DIAGNOSIS — R06.02 SHORTNESS OF BREATH: ICD-10-CM

## 2023-05-18 LAB
BASOPHILS # BLD: 0.09 E9/L (ref 0–0.2)
BASOPHILS NFR BLD: 0.8 % (ref 0–2)
EOSINOPHIL # BLD: 0.28 E9/L (ref 0.05–0.5)
EOSINOPHIL NFR BLD: 2.6 % (ref 0–6)
ERYTHROCYTE [DISTWIDTH] IN BLOOD BY AUTOMATED COUNT: 16.2 FL (ref 11.5–15)
HCT VFR BLD AUTO: 47 % (ref 34–48)
HGB BLD-MCNC: 15.6 G/DL (ref 11.5–15.5)
IMM GRANULOCYTES # BLD: 0.04 E9/L
IMM GRANULOCYTES NFR BLD: 0.4 % (ref 0–5)
LYMPHOCYTES # BLD: 3.03 E9/L (ref 1.5–4)
LYMPHOCYTES NFR BLD: 28.3 % (ref 20–42)
MCH RBC QN AUTO: 29.6 PG (ref 26–35)
MCHC RBC AUTO-ENTMCNC: 33.2 % (ref 32–34.5)
MCV RBC AUTO: 89.2 FL (ref 80–99.9)
MONOCYTES # BLD: 0.85 E9/L (ref 0.1–0.95)
MONOCYTES NFR BLD: 7.9 % (ref 2–12)
NEUTROPHILS # BLD: 6.43 E9/L (ref 1.8–7.3)
NEUTS SEG NFR BLD: 60 % (ref 43–80)
PLATELET # BLD AUTO: 435 E9/L (ref 130–450)
PMV BLD AUTO: 9.3 FL (ref 7–12)
RBC # BLD AUTO: 5.27 E12/L (ref 3.5–5.5)
REASON FOR REJECTION: NORMAL
REASON FOR REJECTION: NORMAL
REJECTED TEST: NORMAL
REJECTED TEST: NORMAL
WBC # BLD: 10.7 E9/L (ref 4.5–11.5)

## 2023-05-18 PROCEDURE — 85025 COMPLETE CBC W/AUTO DIFF WBC: CPT

## 2023-05-18 PROCEDURE — 84484 ASSAY OF TROPONIN QUANT: CPT

## 2023-05-18 PROCEDURE — 2580000003 HC RX 258

## 2023-05-18 PROCEDURE — 6360000002 HC RX W HCPCS

## 2023-05-18 PROCEDURE — 96374 THER/PROPH/DIAG INJ IV PUSH: CPT

## 2023-05-18 PROCEDURE — 71045 X-RAY EXAM CHEST 1 VIEW: CPT

## 2023-05-18 PROCEDURE — 99285 EMERGENCY DEPT VISIT HI MDM: CPT

## 2023-05-18 PROCEDURE — 80048 BASIC METABOLIC PNL TOTAL CA: CPT

## 2023-05-18 PROCEDURE — 96376 TX/PRO/DX INJ SAME DRUG ADON: CPT

## 2023-05-18 PROCEDURE — 84443 ASSAY THYROID STIM HORMONE: CPT

## 2023-05-18 PROCEDURE — 84436 ASSAY OF TOTAL THYROXINE: CPT

## 2023-05-18 PROCEDURE — 93005 ELECTROCARDIOGRAM TRACING: CPT

## 2023-05-18 PROCEDURE — 6370000000 HC RX 637 (ALT 250 FOR IP)

## 2023-05-18 RX ORDER — ONDANSETRON 2 MG/ML
4 INJECTION INTRAMUSCULAR; INTRAVENOUS ONCE
Status: COMPLETED | OUTPATIENT
Start: 2023-05-18 | End: 2023-05-18

## 2023-05-18 RX ORDER — 0.9 % SODIUM CHLORIDE 0.9 %
1000 INTRAVENOUS SOLUTION INTRAVENOUS ONCE
Status: COMPLETED | OUTPATIENT
Start: 2023-05-18 | End: 2023-05-18

## 2023-05-18 RX ORDER — HYDROCODONE BITARTRATE AND ACETAMINOPHEN 5; 325 MG/1; MG/1
1 TABLET ORAL ONCE
Status: COMPLETED | OUTPATIENT
Start: 2023-05-19 | End: 2023-05-19

## 2023-05-18 RX ORDER — HYDROCODONE BITARTRATE AND ACETAMINOPHEN 5; 325 MG/1; MG/1
1 TABLET ORAL ONCE
Status: COMPLETED | OUTPATIENT
Start: 2023-05-18 | End: 2023-05-18

## 2023-05-18 RX ORDER — ONDANSETRON 2 MG/ML
4 INJECTION INTRAMUSCULAR; INTRAVENOUS ONCE
Status: COMPLETED | OUTPATIENT
Start: 2023-05-19 | End: 2023-05-19

## 2023-05-18 RX ADMIN — HYDROCODONE BITARTRATE AND ACETAMINOPHEN 1 TABLET: 5; 325 TABLET ORAL at 21:21

## 2023-05-18 RX ADMIN — SODIUM CHLORIDE 1000 ML: 9 INJECTION, SOLUTION INTRAVENOUS at 21:21

## 2023-05-18 RX ADMIN — ONDANSETRON 4 MG: 2 INJECTION INTRAMUSCULAR; INTRAVENOUS at 21:21

## 2023-05-18 ASSESSMENT — PAIN SCALES - GENERAL
PAINLEVEL_OUTOF10: 8

## 2023-05-18 ASSESSMENT — PAIN - FUNCTIONAL ASSESSMENT: PAIN_FUNCTIONAL_ASSESSMENT: 0-10

## 2023-05-18 ASSESSMENT — PAIN DESCRIPTION - LOCATION: LOCATION: TEETH

## 2023-05-19 LAB
ANION GAP SERPL CALCULATED.3IONS-SCNC: 13 MMOL/L (ref 7–16)
BUN SERPL-MCNC: 14 MG/DL (ref 6–20)
CALCIUM SERPL-MCNC: 9.8 MG/DL (ref 8.6–10.2)
CHLORIDE SERPL-SCNC: 104 MMOL/L (ref 98–107)
CO2 SERPL-SCNC: 23 MMOL/L (ref 22–29)
CREAT SERPL-MCNC: 0.7 MG/DL (ref 0.5–1)
GLUCOSE SERPL-MCNC: 95 MG/DL (ref 74–99)
POTASSIUM SERPL-SCNC: 4.8 MMOL/L (ref 3.5–5)
SODIUM SERPL-SCNC: 140 MMOL/L (ref 132–146)
T4 SERPL-MCNC: 7.2 MCG/DL (ref 4.5–11.7)
TROPONIN, HIGH SENSITIVITY: <6 NG/L (ref 0–9)
TSH SERPL-MCNC: 2.24 UIU/ML (ref 0.27–4.2)

## 2023-05-19 PROCEDURE — 6360000002 HC RX W HCPCS

## 2023-05-19 PROCEDURE — 6370000000 HC RX 637 (ALT 250 FOR IP)

## 2023-05-19 RX ADMIN — HYDROCODONE BITARTRATE AND ACETAMINOPHEN 1 TABLET: 5; 325 TABLET ORAL at 00:03

## 2023-05-19 RX ADMIN — ONDANSETRON 4 MG: 2 INJECTION INTRAMUSCULAR; INTRAVENOUS at 00:03

## 2023-05-19 ASSESSMENT — PAIN SCALES - GENERAL: PAINLEVEL_OUTOF10: 9

## 2023-05-19 NOTE — ED PROVIDER NOTES
HYDROcodone-acetaminophen (NORCO) 5-325 MG per tablet 1 tablet (1 tablet Oral Given 5/19/23 0003)   ondansetron (ZOFRAN) injection 4 mg (4 mg IntraVENous Given 5/19/23 0003)           Is this patient to be included in the SEP-1 Core Measure due to severe sepsis or septic shock? No   Exclusion criteria - the patient is NOT to be included for SEP-1 Core Measure due to: Infection is not suspected        Medical Decision Making/Differential Diagnosis:    CC/HPI Summary, Social Determinants of health, Records Reviewed, DDx, testing done/not done, ED Course, Reassessment, disposition considerations/shared decision making with patient, consults, disposition:      ED Course as of 05/19/23 0657   Thu May 18, 2023   4887 Patient states Norco improved her pain, however it is returning. She is requesting more pain control and nausea control [KM]      ED Course User Index  [KM] Brunilda Sy MD      She is a 72-year-old female with a history of celiac disease, Christina-Danlos syndrome, asthma with COPD, rheumatoid arthritis, HTN, anxiety, PTSD, depression, POTS, fibromyalgia, seizures, GERD presenting for left-sided dental pain as well as shortness of breath and palpitations. Patient states she has an appointment for tooth extraction tomorrow and has recently finished 2 courses of amoxicillin. Differentials include but not limited to dental abscess, gingival infection, tooth decay, PTX, pneumonia, hyperthyroidism, ACS. At the time my exam the patient is resting comfortably in a chair in no acute distress. Her vitals show mild tachycardia to 105 otherwise unremarkable. She is afebrile. Her exam shows mild gum discoloration at the base of the left lower molar otherwise no erythema or areas of fluctuance or induration noted to the left cheek or oropharynx. Exam otherwise unremarkable. Her CBC and BMP are unremarkable. Her EKG is unremarkable and stable with prior. Her troponin is less than 6.   Her thyroid studies

## 2023-05-20 LAB
EKG ATRIAL RATE: 84 BPM
EKG P AXIS: 33 DEGREES
EKG P-R INTERVAL: 152 MS
EKG Q-T INTERVAL: 346 MS
EKG QRS DURATION: 80 MS
EKG QTC CALCULATION (BAZETT): 408 MS
EKG R AXIS: 35 DEGREES
EKG T AXIS: 34 DEGREES
EKG VENTRICULAR RATE: 84 BPM

## 2023-05-20 PROCEDURE — 93010 ELECTROCARDIOGRAM REPORT: CPT | Performed by: INTERNAL MEDICINE

## 2023-06-05 RX ORDER — MONTELUKAST SODIUM 10 MG/1
10 TABLET ORAL DAILY
Qty: 30 TABLET | Refills: 3 | OUTPATIENT
Start: 2023-06-05

## 2023-06-05 RX ORDER — ALBUTEROL SULFATE 90 UG/1
1 AEROSOL, METERED RESPIRATORY (INHALATION) EVERY 4 HOURS PRN
Qty: 1 EACH | Refills: 0 | OUTPATIENT
Start: 2023-06-05

## 2023-11-02 ENCOUNTER — OFFICE VISIT (OUTPATIENT)
Dept: PRIMARY CARE CLINIC | Age: 48
End: 2023-11-02
Payer: MEDICAID

## 2023-11-02 VITALS
TEMPERATURE: 97.1 F | SYSTOLIC BLOOD PRESSURE: 136 MMHG | DIASTOLIC BLOOD PRESSURE: 80 MMHG | HEIGHT: 65 IN | WEIGHT: 201 LBS | OXYGEN SATURATION: 97 % | BODY MASS INDEX: 33.49 KG/M2 | HEART RATE: 71 BPM

## 2023-11-02 DIAGNOSIS — I10 ESSENTIAL HYPERTENSION: ICD-10-CM

## 2023-11-02 DIAGNOSIS — Z91.010 PEANUT ALLERGY: ICD-10-CM

## 2023-11-02 DIAGNOSIS — E53.8 FOLATE DEFICIENCY: ICD-10-CM

## 2023-11-02 DIAGNOSIS — Q79.60 EHLERS-DANLOS SYNDROME: ICD-10-CM

## 2023-11-02 DIAGNOSIS — G70.9 NEUROMUSCULAR DISEASE (HCC): ICD-10-CM

## 2023-11-02 DIAGNOSIS — G43.909 MIGRAINE WITHOUT STATUS MIGRAINOSUS, NOT INTRACTABLE, UNSPECIFIED MIGRAINE TYPE: ICD-10-CM

## 2023-11-02 DIAGNOSIS — M79.7 FIBROMYALGIA: ICD-10-CM

## 2023-11-02 DIAGNOSIS — G90.A POTS (POSTURAL ORTHOSTATIC TACHYCARDIA SYNDROME): ICD-10-CM

## 2023-11-02 DIAGNOSIS — E55.9 VITAMIN D DEFICIENCY: ICD-10-CM

## 2023-11-02 DIAGNOSIS — Z00.01 ENCOUNTER FOR WELL ADULT EXAM WITH ABNORMAL FINDINGS: Primary | ICD-10-CM

## 2023-11-02 PROCEDURE — 3075F SYST BP GE 130 - 139MM HG: CPT | Performed by: FAMILY MEDICINE

## 2023-11-02 PROCEDURE — 3079F DIAST BP 80-89 MM HG: CPT | Performed by: FAMILY MEDICINE

## 2023-11-02 PROCEDURE — 99396 PREV VISIT EST AGE 40-64: CPT | Performed by: FAMILY MEDICINE

## 2023-11-02 RX ORDER — MONTELUKAST SODIUM 10 MG/1
10 TABLET ORAL DAILY
Qty: 30 TABLET | Refills: 3 | Status: SHIPPED | OUTPATIENT
Start: 2023-11-02

## 2023-11-02 RX ORDER — EPINEPHRINE 0.3 MG/.3ML
0.3 INJECTION SUBCUTANEOUS ONCE
Qty: 2 EACH | Refills: 1 | Status: SHIPPED | OUTPATIENT
Start: 2023-11-02 | End: 2023-11-02

## 2023-11-02 RX ORDER — CETIRIZINE HYDROCHLORIDE 10 MG/1
10 TABLET ORAL DAILY
Qty: 30 TABLET | Refills: 5 | Status: SHIPPED | OUTPATIENT
Start: 2023-11-02

## 2023-11-02 RX ORDER — PAROXETINE 10 MG/1
TABLET, FILM COATED ORAL
COMMUNITY
Start: 2023-10-04

## 2023-11-02 RX ORDER — PROPRANOLOL HYDROCHLORIDE 80 MG/1
80 CAPSULE, EXTENDED RELEASE ORAL DAILY
COMMUNITY
Start: 2023-10-27

## 2023-11-02 RX ORDER — L-METHYLFOLATE-ALGAE-VIT B12-B6 CAP 3-90.314-2-35 MG 3-90.314-2-35 MG
1 CAP ORAL DAILY
Qty: 90 CAPSULE | Refills: 1 | Status: SHIPPED | OUTPATIENT
Start: 2023-11-02

## 2023-11-02 RX ORDER — LISDEXAMFETAMINE DIMESYLATE 30 MG/1
30 CAPSULE ORAL 2 TIMES DAILY
COMMUNITY
Start: 2023-10-11

## 2023-11-02 ASSESSMENT — PATIENT HEALTH QUESTIONNAIRE - PHQ9
7. TROUBLE CONCENTRATING ON THINGS, SUCH AS READING THE NEWSPAPER OR WATCHING TELEVISION: 1
6. FEELING BAD ABOUT YOURSELF - OR THAT YOU ARE A FAILURE OR HAVE LET YOURSELF OR YOUR FAMILY DOWN: NOT AT ALL
7. TROUBLE CONCENTRATING ON THINGS, SUCH AS READING THE NEWSPAPER OR WATCHING TELEVISION: SEVERAL DAYS
SUM OF ALL RESPONSES TO PHQ QUESTIONS 1-9: 6
SUM OF ALL RESPONSES TO PHQ QUESTIONS 1-9: 6
9. THOUGHTS THAT YOU WOULD BE BETTER OFF DEAD, OR OF HURTING YOURSELF: 0
SUM OF ALL RESPONSES TO PHQ QUESTIONS 1-9: 6
5. POOR APPETITE OR OVEREATING: NOT AT ALL
8. MOVING OR SPEAKING SO SLOWLY THAT OTHER PEOPLE COULD HAVE NOTICED. OR THE OPPOSITE, BEING SO FIGETY OR RESTLESS THAT YOU HAVE BEEN MOVING AROUND A LOT MORE THAN USUAL: 0
SUM OF ALL RESPONSES TO PHQ QUESTIONS 1-9: 6
8. MOVING OR SPEAKING SO SLOWLY THAT OTHER PEOPLE COULD HAVE NOTICED. OR THE OPPOSITE - BEING SO FIDGETY OR RESTLESS THAT YOU HAVE BEEN MOVING AROUND A LOT MORE THAN USUAL: NOT AT ALL
2. FEELING DOWN, DEPRESSED OR HOPELESS: 0
4. FEELING TIRED OR HAVING LITTLE ENERGY: NEARLY EVERY DAY
5. POOR APPETITE OR OVEREATING: 0
3. TROUBLE FALLING OR STAYING ASLEEP: 2
9. THOUGHTS THAT YOU WOULD BE BETTER OFF DEAD, OR OF HURTING YOURSELF: NOT AT ALL
SUM OF ALL RESPONSES TO PHQ QUESTIONS 1-9: 6
10. IF YOU CHECKED OFF ANY PROBLEMS, HOW DIFFICULT HAVE THESE PROBLEMS MADE IT FOR YOU TO DO YOUR WORK, TAKE CARE OF THINGS AT HOME, OR GET ALONG WITH OTHER PEOPLE: 1
1. LITTLE INTEREST OR PLEASURE IN DOING THINGS: 0
6. FEELING BAD ABOUT YOURSELF - OR THAT YOU ARE A FAILURE OR HAVE LET YOURSELF OR YOUR FAMILY DOWN: 0
4. FEELING TIRED OR HAVING LITTLE ENERGY: 3
SUM OF ALL RESPONSES TO PHQ9 QUESTIONS 1 & 2: 0
1. LITTLE INTEREST OR PLEASURE IN DOING THINGS: NOT AT ALL
3. TROUBLE FALLING OR STAYING ASLEEP: MORE THAN HALF THE DAYS
10. IF YOU CHECKED OFF ANY PROBLEMS, HOW DIFFICULT HAVE THESE PROBLEMS MADE IT FOR YOU TO DO YOUR WORK, TAKE CARE OF THINGS AT HOME, OR GET ALONG WITH OTHER PEOPLE: SOMEWHAT DIFFICULT
2. FEELING DOWN, DEPRESSED OR HOPELESS: NOT AT ALL

## 2023-11-02 ASSESSMENT — ENCOUNTER SYMPTOMS
EYE PAIN: 0
EYE ITCHING: 0
DIARRHEA: 0
COUGH: 0
CONSTIPATION: 0
VOMITING: 0
RHINORRHEA: 0
SORE THROAT: 0
SINUS PRESSURE: 0
BLOOD IN STOOL: 0
WHEEZING: 0
SHORTNESS OF BREATH: 0
CHEST TIGHTNESS: 0
ABDOMINAL PAIN: 0
BACK PAIN: 0
EYE REDNESS: 0
APNEA: 0
COLOR CHANGE: 0
NAUSEA: 0

## 2023-11-03 ENCOUNTER — TELEPHONE (OUTPATIENT)
Dept: PRIMARY CARE CLINIC | Age: 48
End: 2023-11-03

## 2023-11-06 NOTE — TELEPHONE ENCOUNTER
Different brand of Epi Pen called in. The methylated folic acid not covered. Can try to find it OTC?  If not, check with neurology

## 2023-11-13 RX ORDER — ALBUTEROL SULFATE 90 UG/1
1 AEROSOL, METERED RESPIRATORY (INHALATION) EVERY 4 HOURS PRN
Qty: 1 EACH | Refills: 0 | Status: SHIPPED | OUTPATIENT
Start: 2023-11-13

## 2023-12-08 ENCOUNTER — OFFICE VISIT (OUTPATIENT)
Dept: PAIN MANAGEMENT | Age: 48
End: 2023-12-08
Payer: MEDICAID

## 2023-12-08 ENCOUNTER — PREP FOR PROCEDURE (OUTPATIENT)
Dept: PAIN MANAGEMENT | Age: 48
End: 2023-12-08

## 2023-12-08 VITALS
SYSTOLIC BLOOD PRESSURE: 125 MMHG | DIASTOLIC BLOOD PRESSURE: 85 MMHG | HEART RATE: 95 BPM | TEMPERATURE: 97.4 F | WEIGHT: 201 LBS | OXYGEN SATURATION: 94 % | RESPIRATION RATE: 18 BRPM | HEIGHT: 65 IN | BODY MASS INDEX: 33.49 KG/M2

## 2023-12-08 DIAGNOSIS — G89.4 CHRONIC PAIN SYNDROME: Primary | ICD-10-CM

## 2023-12-08 DIAGNOSIS — M47.816 LUMBAR SPONDYLOSIS: Primary | ICD-10-CM

## 2023-12-08 DIAGNOSIS — M47.817 LUMBOSACRAL SPONDYLOSIS WITHOUT MYELOPATHY: ICD-10-CM

## 2023-12-08 PROCEDURE — 3074F SYST BP LT 130 MM HG: CPT | Performed by: PAIN MEDICINE

## 2023-12-08 PROCEDURE — 99213 OFFICE O/P EST LOW 20 MIN: CPT | Performed by: PAIN MEDICINE

## 2023-12-08 PROCEDURE — 3079F DIAST BP 80-89 MM HG: CPT | Performed by: PAIN MEDICINE

## 2023-12-08 RX ORDER — CIMETIDINE 300 MG/1
300 TABLET, FILM COATED ORAL 2 TIMES DAILY
COMMUNITY
Start: 2023-11-22

## 2023-12-08 RX ORDER — LORAZEPAM 1 MG/1
1 TABLET ORAL EVERY 6 HOURS PRN
COMMUNITY

## 2023-12-08 RX ORDER — DEXLANSOPRAZOLE 60 MG/1
60 CAPSULE, DELAYED RELEASE ORAL DAILY
COMMUNITY
Start: 2023-11-06

## 2023-12-08 RX ORDER — DIAZEPAM 10 MG/1
10 TABLET ORAL DAILY PRN
Qty: 2 TABLET | Refills: 0 | Status: SHIPPED | OUTPATIENT
Start: 2023-12-08 | End: 2023-12-10

## 2023-12-20 NOTE — PROGRESS NOTES
[address] [facility] PAIN MANAGEMENT  INSTRUCTIONS  . .......................................................................................................................................... [x] Parking the day of Surgery is located in the [facility].   Upon entering the door, make immediate right into the surgery reception room    [x]  Bring photo ID and insurance card     [x] You may have a light breakfast day of procedure    [x]  Wear loose comfortable clothing    [x]  Please follow instructions for medications as given per Dr's office    [x] You can expect a call the business day prior to procedure to notify you of your arrival time     [x] Please arrange for     []  Other instructions

## 2023-12-28 ENCOUNTER — HOSPITAL ENCOUNTER (OUTPATIENT)
Age: 48
Setting detail: OUTPATIENT SURGERY
Discharge: HOME | End: 2023-12-28
Attending: PAIN MEDICINE | Admitting: PAIN MEDICINE
Payer: MEDICAID

## 2023-12-28 ENCOUNTER — HOSPITAL ENCOUNTER (OUTPATIENT)
Dept: GENERAL RADIOLOGY | Age: 48
Setting detail: OUTPATIENT SURGERY
Discharge: HOME | End: 2023-12-30
Attending: PAIN MEDICINE
Payer: MEDICAID

## 2023-12-28 VITALS
TEMPERATURE: 97 F | BODY MASS INDEX: 33.49 KG/M2 | OXYGEN SATURATION: 98 % | HEART RATE: 75 BPM | RESPIRATION RATE: 18 BRPM | HEIGHT: 65 IN | SYSTOLIC BLOOD PRESSURE: 143 MMHG | WEIGHT: 201 LBS | DIASTOLIC BLOOD PRESSURE: 88 MMHG

## 2023-12-28 DIAGNOSIS — R52 PAIN MANAGEMENT: ICD-10-CM

## 2023-12-28 PROCEDURE — 3600000012 HC SURGERY LEVEL 2 ADDTL 15MIN: Performed by: PAIN MEDICINE

## 2023-12-28 PROCEDURE — 7100000010 HC PHASE II RECOVERY - FIRST 15 MIN: Performed by: PAIN MEDICINE

## 2023-12-28 PROCEDURE — 3600000002 HC SURGERY LEVEL 2 BASE: Performed by: PAIN MEDICINE

## 2023-12-28 PROCEDURE — 2709999900 HC NON-CHARGEABLE SUPPLY: Performed by: PAIN MEDICINE

## 2023-12-28 PROCEDURE — 64636 DESTROY L/S FACET JNT ADDL: CPT | Performed by: PAIN MEDICINE

## 2023-12-28 PROCEDURE — 64635 DESTROY LUMB/SAC FACET JNT: CPT | Performed by: PAIN MEDICINE

## 2023-12-28 PROCEDURE — 6360000002 HC RX W HCPCS: Performed by: PAIN MEDICINE

## 2023-12-28 PROCEDURE — 7100000011 HC PHASE II RECOVERY - ADDTL 15 MIN: Performed by: PAIN MEDICINE

## 2023-12-28 PROCEDURE — 2500000003 HC RX 250 WO HCPCS: Performed by: PAIN MEDICINE

## 2023-12-28 RX ORDER — METHYLPREDNISOLONE ACETATE 40 MG/ML
INJECTION, SUSPENSION INTRA-ARTICULAR; INTRALESIONAL; INTRAMUSCULAR; SOFT TISSUE PRN
Status: DISCONTINUED | OUTPATIENT
Start: 2023-12-28 | End: 2023-12-28 | Stop reason: ALTCHOICE

## 2023-12-28 RX ORDER — BUPIVACAINE HYDROCHLORIDE 2.5 MG/ML
INJECTION, SOLUTION EPIDURAL; INFILTRATION; INTRACAUDAL PRN
Status: DISCONTINUED | OUTPATIENT
Start: 2023-12-28 | End: 2023-12-28 | Stop reason: ALTCHOICE

## 2023-12-28 RX ORDER — LIDOCAINE HYDROCHLORIDE 20 MG/ML
INJECTION, SOLUTION EPIDURAL; INFILTRATION; INTRACAUDAL; PERINEURAL PRN
Status: DISCONTINUED | OUTPATIENT
Start: 2023-12-28 | End: 2023-12-28 | Stop reason: ALTCHOICE

## 2023-12-28 NOTE — DISCHARGE INSTRUCTIONS
Gaye Solders  Dr. Renee Mend Dr. Teresia Leyden Block/Radiofrequency  Home Going Instructions    1-Go home, rest for the remainder of the day  2-Please do not lift over 20 pounds the day of the injection  3-If you received sedation No: alcohol, driving, operating lawn mowers, plows, tractors or other dangerous equipment until next morning. Do not make important decisions or sign legal documents for 24 hours. You may experience light headedness, dizziness, nausea or sleepiness after sedation. Do not stay alone. A responsible adult must be with you for 24 hours. You could be nauseated from the medications you have received. Your IV site may be sore and bruised. 4-No dietary restrictions     5-Resume all medications the same day, blood thinners to be resumed 24 hours after injection if you were instructed to stop any. 6-Keep the surgical site clean and dry, you may shower the next morning and remove the      dressing. 7- No sitz baths, tub baths or hot tubs/swimming for 24 hours. 8- If you have any pain at the injection site(s), application of an ice pack to the area should be       helpful, 20 minutes on/20 minutes off for next 48 hours. 9- Call [facility] immediately at if you develop.   Fever greater than 100.4 F  Have bleeding or drainage from the puncture site  Have progressive Leg/arm numbness and or weakness  Loss of control of bowel and or bladder (wet/soil yourself)  Severe headache with inability to lift head  10-You may return to work the next day

## 2023-12-28 NOTE — PROGRESS NOTES
Patient ready for discharge, able to stand and pivot with cane to wheel chair. States numbness has improved. Tolerated fairly well. Mother transporting patient.

## 2023-12-28 NOTE — OP NOTE
2023    Patient: Renee Olson  :  1975  Age:  57 y.o. Sex:  female     PRE-OPERATIVE DIAGNOSIS: Right Lumbar spondylosis, lumbar facet arthropathy. POST-OPERATIVE DIAGNOSIS: Same. PROCEDURE:  Fluoroscopic-guided Right  Lumbar facet medial branch radiofrequency ablation at levels L3,4,5 (anesthetizing the L4-5 and L5-S1 facet joints). SURGEON:  KIM Wilson. ANESTHESIA: local    ESTIMATED BLOOD LOSS: None.  ______________________________________________________________________  HISTORY & PHYSICAL: Renee Olson presents today for fluoroscopic-guided Right lumbar facet medial branch radiofrequency ablation at levels L3,4,5. The potential complications of the procedure were explained to [facility] again today and she has elected to undergo the aforementioned procedure. [facility] complete History & Physical examination were reviewed in depth, a copy of which is in the chart. DESCRIPTION OF PROCEDURE:    After confirming written and informed consent, a time-out was performed and [facility] name and date of birth, the procedure to be performed as well as the plan for the location of the needle insertion were confirmed. The patient was brought into the procedure room and placed in the prone position on the fluoroscopy table. Standard monitors were placed and vital signs were observed throughout the procedure. The area of the lumbar spine and upper buttocks was sterilely prepped with chloraprep and draped in a sterile manner. AP fluoroscopy was used to identify and cordelia bartons point at the targeted area. A 22 gauge 10 mm radiofrequency probe was advanced toward each of these points. Once bone was contacted, negative aspiration for blood and CSF was confirmed, sensory stimulation was performed at 50 Hz and at 0.4 volts generating a pressure sensation. Motor stimulation < 2.0 volts elicited multifidus twitching without any radicular symptoms.  1 ml of 2% lidocaine was injected prior to lesioning,

## 2023-12-28 NOTE — H&P
Provider, MD Jeni   propranolol (INDERAL LA) 80 MG extended release capsule Take 1 capsule by mouth daily 10/27/23   ProviderJeni MD   L-methylfolate-B6-B12 Arthuro Safer) 7-60.614-7-28 MG CAPS capsule Take 1 capsule by mouth daily  Patient not taking: Reported on 12/20/2023 11/2/23   Doron Freed DO   montelukast (SINGULAIR) 10 MG tablet Take 1 tablet by mouth daily 11/2/23   Jignesh Hope DO   cetirizine (ZYRTEC) 10 MG tablet Take 1 tablet by mouth daily 11/2/23   Jignesh Hope DO   EPINEPHrine (EPIPEN 2-ZAINAB) 0.3 MG/0.3ML SOAJ injection Inject 0.3 mLs into the muscle once for 1 dose Use as directed for allergic reaction 11/2/23 11/2/23  Fay MADISON,    vitamin D (ERGOCALCIFEROL) 1.25 MG (39470 UT) CAPS capsule Take 1 tablet on Monday and 1 tablet on Thursday 5/1/23   JESSE Pate   calcium carbonate (CALCIUM 600) 600 MG TABS tablet Take 2 tablets by mouth daily 4/27/23   JESSE Pate   Galcanezumab-gnlm 120 MG/ML SOAJ Inject 120 mg into the skin every 30 days  Patient not taking: Reported on 12/20/2023 12/27/22 2/27/24  Tyrese Saenz MD       Allergies   Allergen Reactions    Peanuts [Peanut Oil] Hives and Itching    Codeine Hives    Demerol Hives    Morphine Hives     pt states that she has tolerated Dilaudid in the past w/o reaction (5/4/11)    Topiramate      Other reaction(s): Other: See Comments  heart palpitations    Tramadol Other (See Comments)     Other reaction(s):  Other: See Comments  also seizure     Casein Nausea And Vomiting    Eggs Or Egg-Derived Products Nausea And Vomiting    Gluten Meal Nausea And Vomiting    Nsaids      Other reaction(s): GI Upset  H/o ulcers    Prochlorperazine Edisylate Nausea And Vomiting and Myalgia    Trazodone And Nefazodone Other (See Comments)     Nasal sinus swelling    Whey Nausea And Vomiting       Social History     Socioeconomic History    Marital status:      Spouse name: Not on file

## 2024-01-22 ENCOUNTER — PREP FOR PROCEDURE (OUTPATIENT)
Dept: PAIN MANAGEMENT | Age: 49
End: 2024-01-22

## 2024-02-12 ENCOUNTER — OFFICE VISIT (OUTPATIENT)
Dept: FAMILY MEDICINE CLINIC | Age: 49
End: 2024-02-12
Payer: MEDICAID

## 2024-02-12 ENCOUNTER — TELEPHONE (OUTPATIENT)
Dept: PRIMARY CARE CLINIC | Age: 49
End: 2024-02-12

## 2024-02-12 VITALS
WEIGHT: 200 LBS | OXYGEN SATURATION: 96 % | DIASTOLIC BLOOD PRESSURE: 78 MMHG | HEART RATE: 103 BPM | SYSTOLIC BLOOD PRESSURE: 128 MMHG | BODY MASS INDEX: 33.32 KG/M2 | HEIGHT: 65 IN | TEMPERATURE: 97.5 F

## 2024-02-12 DIAGNOSIS — J02.0 STREP PHARYNGITIS: ICD-10-CM

## 2024-02-12 DIAGNOSIS — J20.9 ACUTE BRONCHITIS, UNSPECIFIED ORGANISM: ICD-10-CM

## 2024-02-12 DIAGNOSIS — J45.41 MODERATE PERSISTENT ASTHMA WITH ACUTE EXACERBATION: Primary | ICD-10-CM

## 2024-02-12 DIAGNOSIS — R21 RASH AND NONSPECIFIC SKIN ERUPTION: ICD-10-CM

## 2024-02-12 DIAGNOSIS — R05.9 COUGH, UNSPECIFIED TYPE: ICD-10-CM

## 2024-02-12 LAB
INFLUENZA A ANTIBODY: NORMAL
INFLUENZA B ANTIBODY: NORMAL
Lab: NORMAL
PERFORMING INSTRUMENT: NORMAL
QC PASS/FAIL: NORMAL
S PYO AG THROAT QL: POSITIVE
SARS-COV-2, POC: NORMAL

## 2024-02-12 PROCEDURE — 94640 AIRWAY INHALATION TREATMENT: CPT | Performed by: PHYSICIAN ASSISTANT

## 2024-02-12 PROCEDURE — 87804 INFLUENZA ASSAY W/OPTIC: CPT | Performed by: PHYSICIAN ASSISTANT

## 2024-02-12 PROCEDURE — 3074F SYST BP LT 130 MM HG: CPT | Performed by: PHYSICIAN ASSISTANT

## 2024-02-12 PROCEDURE — 99205 OFFICE O/P NEW HI 60 MIN: CPT | Performed by: PHYSICIAN ASSISTANT

## 2024-02-12 PROCEDURE — 3078F DIAST BP <80 MM HG: CPT | Performed by: PHYSICIAN ASSISTANT

## 2024-02-12 PROCEDURE — 87426 SARSCOV CORONAVIRUS AG IA: CPT | Performed by: PHYSICIAN ASSISTANT

## 2024-02-12 PROCEDURE — 87880 STREP A ASSAY W/OPTIC: CPT | Performed by: PHYSICIAN ASSISTANT

## 2024-02-12 RX ORDER — BENZONATATE 100 MG/1
100 CAPSULE ORAL 3 TIMES DAILY PRN
Qty: 21 CAPSULE | Refills: 0 | Status: SHIPPED | OUTPATIENT
Start: 2024-02-12 | End: 2024-02-19

## 2024-02-12 RX ORDER — METHYLPREDNISOLONE ACETATE 80 MG/ML
80 INJECTION, SUSPENSION INTRA-ARTICULAR; INTRALESIONAL; INTRAMUSCULAR; SOFT TISSUE ONCE
Status: COMPLETED | OUTPATIENT
Start: 2024-02-12 | End: 2024-02-12

## 2024-02-12 RX ORDER — AMOXICILLIN AND CLAVULANATE POTASSIUM 875; 125 MG/1; MG/1
1 TABLET, FILM COATED ORAL 2 TIMES DAILY
Qty: 20 TABLET | Refills: 0 | Status: SHIPPED | OUTPATIENT
Start: 2024-02-12 | End: 2024-02-22

## 2024-02-12 RX ORDER — METHYLPREDNISOLONE 4 MG/1
TABLET ORAL
Qty: 1 KIT | Refills: 0 | Status: SHIPPED | OUTPATIENT
Start: 2024-02-12

## 2024-02-12 RX ORDER — ALBUTEROL SULFATE 2.5 MG/3ML
2.5 SOLUTION RESPIRATORY (INHALATION) EVERY 6 HOURS PRN
Qty: 120 EACH | Refills: 3 | Status: SHIPPED | OUTPATIENT
Start: 2024-02-12

## 2024-02-12 RX ORDER — VALACYCLOVIR HYDROCHLORIDE 1 G/1
1000 TABLET, FILM COATED ORAL 3 TIMES DAILY
Qty: 21 TABLET | Refills: 0 | Status: SHIPPED | OUTPATIENT
Start: 2024-02-12 | End: 2024-02-19

## 2024-02-12 RX ORDER — IPRATROPIUM BROMIDE AND ALBUTEROL SULFATE 2.5; .5 MG/3ML; MG/3ML
1 SOLUTION RESPIRATORY (INHALATION) ONCE
Status: COMPLETED | OUTPATIENT
Start: 2024-02-12 | End: 2024-02-12

## 2024-02-12 RX ORDER — GUAIFENESIN 400 MG/1
400 TABLET ORAL 4 TIMES DAILY PRN
COMMUNITY

## 2024-02-12 RX ADMIN — METHYLPREDNISOLONE ACETATE 80 MG: 80 INJECTION, SUSPENSION INTRA-ARTICULAR; INTRALESIONAL; INTRAMUSCULAR; SOFT TISSUE at 16:44

## 2024-02-12 RX ADMIN — IPRATROPIUM BROMIDE AND ALBUTEROL SULFATE 1 DOSE: 2.5; .5 SOLUTION RESPIRATORY (INHALATION) at 16:44

## 2024-02-12 NOTE — PROGRESS NOTES
ABLATION performed by Bonnie Torres DO at [facility] OR    SMALL INTESTINE SURGERY      related to endometriosis    MARCELINO AND BSO (CERVIX REMOVED)      TONSILLECTOMY         Family History   Problem Relation Age of Onset    High Blood Pressure Mother     High Blood Pressure Father     High Cholesterol Father     No Known Problems Sister     Breast Cancer Maternal Grandmother        Medications:     Current Outpatient Medications:     guaiFENesin 400 MG tablet, Take 1 tablet by mouth 4 times daily as needed for Cough, Disp: , Rfl:     albuterol (PROVENTIL) (2.5 MG/3ML) 0.083% nebulizer solution, Take 3 mLs by nebulization every 6 hours as needed for Wheezing, Disp: 120 each, Rfl: 3    amoxicillin-clavulanate (AUGMENTIN) 875-125 MG per tablet, Take 1 tablet by mouth 2 times daily for 10 days, Disp: 20 tablet, Rfl: 0    methylPREDNISolone (MEDROL DOSEPACK) 4 MG tablet, Take by mouth., Disp: 1 kit, Rfl: 0    benzonatate (TESSALON) 100 MG capsule, Take 1 capsule by mouth 3 times daily as needed for Cough, Disp: 21 capsule, Rfl: 0    valACYclovir (VALTREX) 1 g tablet, Take 1 tablet by mouth 3 times daily for 7 days, Disp: 21 tablet, Rfl: 0    cimetidine (TAGAMET) 300 MG tablet, Take 1 tablet by mouth 2 times daily, Disp: , Rfl:     dexlansoprazole (DEXILANT) 60 MG CPDR delayed release capsule, Take 1 capsule by mouth daily, Disp: , Rfl:     LORazepam (ATIVAN) 1 MG tablet, Take 1 tablet by mouth every 6 hours as needed for Anxiety., Disp: , Rfl:     albuterol sulfate HFA (VENTOLIN HFA) 108 (90 Base) MCG/ACT inhaler, Inhale 1 puff into the lungs every 4 hours as needed for Wheezing, Disp: 1 each, Rfl: 0    EPINEPHrine (SYMJEPI) 0.3 MG/0.3ML SOSY injection, Inject 0.3 mLs into the muscle as needed for Anaphylaxis, Disp: 1 each, Rfl: 1    VYVANSE 30 MG capsule, Take 1 capsule by mouth 2 times daily., Disp: , Rfl:     PARoxetine (PAXIL) 10 MG tablet, TAKE ONE TABLET BY MOUTH EVERY DAY IN THE MORNING, Disp: , Rfl:     propranolol

## 2024-03-25 RX ORDER — MONTELUKAST SODIUM 10 MG/1
10 TABLET ORAL DAILY
Qty: 30 TABLET | Refills: 3 | Status: SHIPPED | OUTPATIENT
Start: 2024-03-25

## 2024-04-01 ENCOUNTER — TELEPHONE (OUTPATIENT)
Dept: PRIMARY CARE CLINIC | Age: 49
End: 2024-04-01

## 2024-04-01 NOTE — TELEPHONE ENCOUNTER
Patients apartment complex is going to be sending over a form for signature stating that her apartment is not to be sprayed with a pesticide due to her medical condition.   Apartment is going to be spraying pesticides twice a year.     Aware PCP is [facility] and form can be signed when he comes back.

## 2024-05-15 ENCOUNTER — PATIENT MESSAGE (OUTPATIENT)
Dept: PRIMARY CARE CLINIC | Age: 49
End: 2024-05-15

## 2024-05-15 NOTE — TELEPHONE ENCOUNTER
From: Mandi Merino  To: Dr. Jignesh Marcano  Sent: 5/15/2024 1:45 PM EDT  Subject: Order for wheelchair     Dr Marcano wrote an order for me to get a wheelchair, but when I tried to fill it at [facility] in [place] they said the order needs to include more information in order for Medicaid to cover it .     This is what they said it has to include:  - office notes that discuss the need for the chair for activities of daily living INSIDE the home  - cannot have anything about ambulation with cane or walker   - also needs to state the reason for elevating leg rests (POTS syndrome)    Please feel free to call me at [phone] if you have questions or need clarification on anything. When the order is done it should be faxed to [facility] [phone].  Thank you so much!

## 2024-05-15 NOTE — TELEPHONE ENCOUNTER
Addendum made to November 2nd office note  Please print out new progress note and fax along with the DME order from 11/2/2023  Fax these to the number listed for [facility]

## 2024-05-22 NOTE — TELEPHONE ENCOUNTER
Keysha calling back regarding light weight wheelchair order. They said with the light weight wheelchair, the documentation in the note needs to go a step further.    Needs to say \"Patient cannot self propel with standard chair inside home, but can self propel with light weight chair inside home\".   Keysha self if you can addend the note and refax they can get this set up for patient.  Keysha Fax [phone]

## 2024-06-21 RX ORDER — CETIRIZINE HYDROCHLORIDE 10 MG/1
10 TABLET ORAL DAILY
Qty: 30 TABLET | Refills: 5 | Status: SHIPPED | OUTPATIENT
Start: 2024-06-21

## 2024-06-21 NOTE — PROGRESS NOTES
2021    Patient: Anupama Chavez  :  1975  Age:  39 y.o. Sex:  female     PRE-PROCEDURE DIAGNOSIS: Myofascial pain. POST-PROCEDURE DIAGNOSIS: Same. PROCEDURE: bilateral lumbar paraspinal and gluteal trigger point injections under ultrasound guidance. SURGEON:   KIM العلي. ANESTHESIA: local    ESTIMATED BLOOD LOSS:  None.  ______________________________________________________________________    BRIEF HISTORY: Anupama Chavez comes in today for bilateral lumbar paraspinal and gluteal trigger point injection. After discussing the potential risks and benefits of the procedure with the patient. Mandi did request that we proceed. A complete History & Physical was reviewed and it is unchanged. DESCRIPTION OF PROCEDURE:   Each trigger point was marked with patient input, prepped with chloraprep and draped. A #25-gauge, 1.5-inch needle was passed into the area of greatest tenderness under ultrasound guidance. Each trigger point received equal, divided dosages or a total of 10 cc of 0.25% Marcaine after negative aspiration of blood, air or paresthesia with ultrasound visualization of solution spread. The needle was removed intact and Band-Aids were applied. Disposition the patient tolerated the procedure well and there were no complications . Mandi will follow up in our [address] [place] [address] as scheduled. She was encouraged to call with questions, concerns or if worsening of symptoms occurs.     Lauren Hernández DO I independently performed the documented:

## 2024-08-20 ENCOUNTER — OFFICE VISIT (OUTPATIENT)
Dept: PAIN MANAGEMENT | Age: 49
End: 2024-08-20
Payer: MEDICAID

## 2024-08-20 VITALS
HEART RATE: 101 BPM | OXYGEN SATURATION: 97 % | BODY MASS INDEX: 33.32 KG/M2 | SYSTOLIC BLOOD PRESSURE: 140 MMHG | HEIGHT: 65 IN | DIASTOLIC BLOOD PRESSURE: 69 MMHG | RESPIRATION RATE: 16 BRPM | TEMPERATURE: 97.7 F | WEIGHT: 200 LBS

## 2024-08-20 DIAGNOSIS — G89.29 CHRONIC BILATERAL LOW BACK PAIN WITHOUT SCIATICA: ICD-10-CM

## 2024-08-20 DIAGNOSIS — M51.9 LUMBAR DISC DISORDER: ICD-10-CM

## 2024-08-20 DIAGNOSIS — M54.2 NECK PAIN: ICD-10-CM

## 2024-08-20 DIAGNOSIS — M47.816 LUMBAR SPONDYLOSIS: Primary | ICD-10-CM

## 2024-08-20 DIAGNOSIS — G89.4 CHRONIC PAIN SYNDROME: ICD-10-CM

## 2024-08-20 DIAGNOSIS — M54.50 CHRONIC BILATERAL LOW BACK PAIN WITHOUT SCIATICA: ICD-10-CM

## 2024-08-20 DIAGNOSIS — M79.10 MYALGIA: ICD-10-CM

## 2024-08-20 DIAGNOSIS — M47.817 LUMBOSACRAL SPONDYLOSIS WITHOUT MYELOPATHY: ICD-10-CM

## 2024-08-20 PROCEDURE — 99214 OFFICE O/P EST MOD 30 MIN: CPT | Performed by: PHYSICIAN ASSISTANT

## 2024-08-20 PROCEDURE — 3077F SYST BP >= 140 MM HG: CPT | Performed by: PHYSICIAN ASSISTANT

## 2024-08-20 PROCEDURE — 99213 OFFICE O/P EST LOW 20 MIN: CPT | Performed by: PHYSICIAN ASSISTANT

## 2024-08-20 PROCEDURE — 3078F DIAST BP <80 MM HG: CPT | Performed by: PHYSICIAN ASSISTANT

## 2024-08-20 RX ORDER — DIAZEPAM 10 MG/1
10 TABLET ORAL ONCE
Qty: 1 TABLET | Refills: 0 | Status: SHIPPED | OUTPATIENT
Start: 2024-08-20 | End: 2024-08-20

## 2024-08-20 NOTE — PROGRESS NOTES
Mandi Merino presents to the [facility] on 8/20/2024. Mandi is complaining of pain back. The pain is constant. The pain is described as sharp. Pain is rated on her best day at a 3, on her worst day at a 9, and on average at a 7 on the VAS scale.     Any procedures since your last visit: Yes, with 80 % relief.    Pacemaker or defibrillator: No     She is not on NSAIDS and is not on anticoagulation medications     Medication Contract and Consent for Opioid Use Documents Filed        No documents found                    BP (!) 140/69   Pulse (!) 101   Temp 97.7 °F (36.5 °C) (Infrared)   Resp 16   Ht 1.651 m (5' 5\")   Wt 90.7 kg (200 lb)   SpO2 97%   BMI 33.28 kg/m²      No LMP recorded. Patient has had a hysterectomy.    
capsule Take 1 capsule by mouth daily 10/27/23  Yes Provider, MD Jeni   L-methylfolate-B6-B12 (METANX) 3-90.314-2-35 MG CAPS capsule Take 1 capsule by mouth daily 11/2/23  Yes Jignesh Marcano DO   methylPREDNISolone (MEDROL DOSEPACK) 4 MG tablet Take by mouth.  Patient not taking: Reported on 8/20/2024 2/12/24   Jason Aguila III, PA   EPINEPHrine (EPIPEN 2-ZAINAB) 0.3 MG/0.3ML SOAJ injection Inject 0.3 mLs into the muscle once for 1 dose Use as directed for allergic reaction 11/2/23 11/2/23  Jignesh Marcano DO   vitamin D (ERGOCALCIFEROL) 1.25 MG (41922 UT) CAPS capsule Take 1 tablet on Monday and 1 tablet on Thursday  Patient not taking: Reported on 8/20/2024 5/1/23   James Nuno APRN - CNS   calcium carbonate (CALCIUM 600) 600 MG TABS tablet Take 2 tablets by mouth daily  Patient not taking: Reported on 8/20/2024 4/27/23   James Nuno APRN - CNS   Galcanezumab-gnlm 120 MG/ML SOAJ Inject 120 mg into the skin every 30 days 12/27/22 2/27/24  Guerline Anthony MD       Allergies   Allergen Reactions    Peanuts [Peanut Oil] Hives and Itching    Codeine Hives    Demerol Hives    Morphine Hives     pt states that she has tolerated Dilaudid in the past w/o reaction (5/4/11)    Topiramate      Other reaction(s): Other: See Comments  heart palpitations    Tramadol Other (See Comments)     Other reaction(s): Other: See Comments  also seizure     Casein Nausea And Vomiting    Egg-Derived Products Nausea And Vomiting    Gluten Meal Nausea And Vomiting    Nsaids      Other reaction(s): GI Upset  H/o ulcers    Prochlorperazine Edisylate Nausea And Vomiting and Myalgia    Trazodone And Nefazodone Other (See Comments)     Nasal sinus swelling    Whey Nausea And Vomiting       Social History     Socioeconomic History    Marital status:      Spouse name: Not on file    Number of children: Not on file    Years of education: Not on file    Highest education level: Not on file   Occupational History

## 2024-08-27 ENCOUNTER — PREP FOR PROCEDURE (OUTPATIENT)
Dept: PAIN MANAGEMENT | Age: 49
End: 2024-08-27

## 2024-08-28 ENCOUNTER — TELEPHONE (OUTPATIENT)
Dept: PAIN MANAGEMENT | Age: 49
End: 2024-08-28

## 2024-08-28 DIAGNOSIS — M51.36 DDD (DEGENERATIVE DISC DISEASE), LUMBAR: ICD-10-CM

## 2024-08-28 DIAGNOSIS — M47.816 LUMBAR SPONDYLOSIS: Primary | ICD-10-CM

## 2024-08-28 PROBLEM — M51.369 DDD (DEGENERATIVE DISC DISEASE), LUMBAR: Status: ACTIVE | Noted: 2024-08-28

## 2024-08-28 NOTE — PROGRESS NOTES
[facility] PAIN MANAGEMENT  INSTRUCTIONS  ...........................................................................................................................................    [x] Parking the day of Surgery is located in the Main Entrance lot.  Upon entering the door, make immediate right into the surgery reception room    [x]  Bring photo ID and insurance card    [x] You may have a light breakfast day of procedure    [x]  Wear loose comfortable clothing    [x]  Please follow instructions for medications as given per Dr's office    [x] You can expect a call the business day prior to procedure to notify you of your arrival time     [x] Please arrange for     []  Other instructions

## 2024-08-28 NOTE — TELEPHONE ENCOUNTER
Call to Mandi Merino that procedure was scheduled for 9/3/2024 and that [facility] should call her a few days before for the pre op call and between 2:00 PM and 4:00 PM  the business day before with the arrival time. Instructed Mandi to hold ibuprofen for 24 hours, Celebrex, Mobic, and naprosyn for 4 days and any aspirin containing products, CoQ 10, or fish oil for 7 days. Instructed to call office back if any questions. Mandi verbalized understanding.    Electronically signed by Berlin Perales RN on 8/28/2024 at 9:00 AM

## 2024-09-03 ENCOUNTER — HOSPITAL ENCOUNTER (OUTPATIENT)
Dept: GENERAL RADIOLOGY | Age: 49
Setting detail: OUTPATIENT SURGERY
Discharge: HOME | End: 2024-09-05
Attending: PAIN MEDICINE
Payer: MEDICAID

## 2024-09-03 ENCOUNTER — HOSPITAL ENCOUNTER (OUTPATIENT)
Age: 49
Setting detail: OUTPATIENT SURGERY
Discharge: HOME | End: 2024-09-03
Attending: PAIN MEDICINE | Admitting: PAIN MEDICINE
Payer: MEDICAID

## 2024-09-03 VITALS
DIASTOLIC BLOOD PRESSURE: 74 MMHG | WEIGHT: 200 LBS | HEART RATE: 70 BPM | TEMPERATURE: 97.8 F | SYSTOLIC BLOOD PRESSURE: 123 MMHG | HEIGHT: 65 IN | RESPIRATION RATE: 18 BRPM | OXYGEN SATURATION: 98 % | BODY MASS INDEX: 33.32 KG/M2

## 2024-09-03 DIAGNOSIS — R52 PAIN MANAGEMENT: ICD-10-CM

## 2024-09-03 PROCEDURE — 3600000002 HC SURGERY LEVEL 2 BASE: Performed by: PAIN MEDICINE

## 2024-09-03 PROCEDURE — 64636 DESTROY L/S FACET JNT ADDL: CPT | Performed by: PAIN MEDICINE

## 2024-09-03 PROCEDURE — 7100000010 HC PHASE II RECOVERY - FIRST 15 MIN: Performed by: PAIN MEDICINE

## 2024-09-03 PROCEDURE — 6360000002 HC RX W HCPCS: Performed by: PAIN MEDICINE

## 2024-09-03 PROCEDURE — 7100000011 HC PHASE II RECOVERY - ADDTL 15 MIN: Performed by: PAIN MEDICINE

## 2024-09-03 PROCEDURE — 3600000012 HC SURGERY LEVEL 2 ADDTL 15MIN: Performed by: PAIN MEDICINE

## 2024-09-03 PROCEDURE — 2500000003 HC RX 250 WO HCPCS: Performed by: PAIN MEDICINE

## 2024-09-03 PROCEDURE — 2709999900 HC NON-CHARGEABLE SUPPLY: Performed by: PAIN MEDICINE

## 2024-09-03 PROCEDURE — 64635 DESTROY LUMB/SAC FACET JNT: CPT | Performed by: PAIN MEDICINE

## 2024-09-03 RX ORDER — LIDOCAINE HYDROCHLORIDE 20 MG/ML
INJECTION, SOLUTION EPIDURAL; INFILTRATION; INTRACAUDAL; PERINEURAL PRN
Status: DISCONTINUED | OUTPATIENT
Start: 2024-09-03 | End: 2024-09-03 | Stop reason: ALTCHOICE

## 2024-09-03 RX ORDER — BUPIVACAINE HYDROCHLORIDE 2.5 MG/ML
INJECTION, SOLUTION EPIDURAL; INFILTRATION; INTRACAUDAL PRN
Status: DISCONTINUED | OUTPATIENT
Start: 2024-09-03 | End: 2024-09-03 | Stop reason: ALTCHOICE

## 2024-09-03 RX ORDER — METHYLPREDNISOLONE ACETATE 40 MG/ML
INJECTION, SUSPENSION INTRA-ARTICULAR; INTRALESIONAL; INTRAMUSCULAR; SOFT TISSUE PRN
Status: DISCONTINUED | OUTPATIENT
Start: 2024-09-03 | End: 2024-09-03 | Stop reason: ALTCHOICE

## 2024-09-03 ASSESSMENT — PAIN DESCRIPTION - DESCRIPTORS
DESCRIPTORS: ACHING;SHARP
DESCRIPTORS: ACHING;SHARP;THROBBING

## 2024-09-03 ASSESSMENT — PAIN DESCRIPTION - LOCATION: LOCATION: BREAST

## 2024-09-03 ASSESSMENT — PAIN SCALES - GENERAL
PAINLEVEL_OUTOF10: 0
PAINLEVEL_OUTOF10: 2

## 2024-09-03 ASSESSMENT — PAIN - FUNCTIONAL ASSESSMENT: PAIN_FUNCTIONAL_ASSESSMENT: 0-10

## 2024-09-03 ASSESSMENT — PAIN DESCRIPTION - ORIENTATION: ORIENTATION: LOWER

## 2024-09-03 ASSESSMENT — PAIN DESCRIPTION - PAIN TYPE: TYPE: CHRONIC PAIN

## 2024-09-03 NOTE — OP NOTE
9/3/2024    Patient: Mandi Merino  :  1975  Age:  48 y.o.  Sex:  female     PRE-OPERATIVE DIAGNOSIS: Left Lumbar spondylosis, lumbar facet arthropathy.    POST-OPERATIVE DIAGNOSIS: Same.    PROCEDURE:  Fluoroscopic-guided Left  Lumbar facet medial branch radiofrequency ablation at levels L3,4,5 (anesthetizing the L4-5 and L5-S1 facet joints).    SURGEON:  KIM Torres D.O.    ANESTHESIA: local    ESTIMATED BLOOD LOSS: None.  ______________________________________________________________________  HISTORY & PHYSICAL: Mandi Merino presents today for fluoroscopic-guided Left lumbar facet medial branch radiofrequency ablation at levels L3,4,5. The potential complications of the procedure were explained to Mandi again today and she has elected to undergo the aforementioned procedure.    Mandi’s complete History & Physical examination were reviewed in depth, a copy of which is in the chart.      DESCRIPTION OF PROCEDURE:    After confirming written and informed consent, a time-out was performed and Mandi’s name and date of birth, the procedure to be performed as well as the plan for the location of the needle insertion were confirmed.    The patient was brought into the procedure room and placed in the prone position on the fluoroscopy table. Standard monitors were placed and vital signs were observed throughout the procedure. The area of the lumbar spine and upper buttocks was sterilely prepped with chloraprep and draped in a sterile manner. AP fluoroscopy was used to identify and cordelia bartons point at the targeted area. A 22 gauge 10 mm radiofrequency probe was advanced toward each of these points. Once bone was contacted, negative aspiration for blood and CSF was confirmed, sensory stimulation was performed at 50 Hz and at 0.4 volts generating a pressure sensation. Motor stimulation < 2.0 volts elicited multifidus twitching without any radicular symptoms. 1 ml of 2% lidocaine was injected prior to lesioning,

## 2024-09-03 NOTE — H&P
[facility]        [address] [place] [address]  Dept: [phone]    Procedure History & Physical      Mandi Merino     HPI:    Patient  is here for left LBP for left L3,4,5 RFA  Labs/imaging studies reviewed   All question and concerns addressed including R/B/A associated with the procedure    Past Medical History:   Diagnosis Date    Anxiety     Anxiety, generalized 11/03/2022    Arthritis     Asthma exacerbation with COPD (chronic obstructive pulmonary disease) ([facility]) 2016    Celiac disease 12/10/2022    Diagnosis by Dr. Troy    Christina-Danlos syndrome     connective tissue syndrome    Fibromyalgia     GERD (gastroesophageal reflux disease)     Lumbar spondylolysis     Major depression     Malabsorption syndrome     POTS (postural orthostatic tachycardia syndrome)     PTSD (post-traumatic stress disorder)     Rheumatoid arthritis ([facility])     Seizures ([facility]) 2005    due to Ultram     Somatic dysfunction of cervical region 11/03/2022    Sx of RLS (restless legs syndrome)        Past Surgical History:   Procedure Laterality Date    ABDOMEN SURGERY  03/05/2012    endometreosis    ANESTHESIA NERVE BLOCK Bilateral 06/16/2020    BILATERAL L3 L4 L5 MEDIAL NERVE BRANCH BLOCK   #1 performed by Bonnie Torres DO at [facility]    ANESTHESIA NERVE BLOCK Bilateral 07/14/2020    BILATERAL L3 4 5 MEDIAL NERVE BRANCH BLOCK # 2 performed by Bonnie Torres DO at [facility]    APPENDECTOMY      BRONCHOSCOPY  11/01/2016    BRONCHOSCOPY  12/14/2016    CHOLECYSTECTOMY  02/25/2016    lap    COLONOSCOPY      COLONOSCOPY  12/2022    ENDOMETRIAL ABLATION      ENDOSCOPY, COLON, DIAGNOSTIC      ESOPHAGOGASTRODUODENOSCOPY  12/2022    HYSTERECTOMY (CERVIX STATUS UNKNOWN)      PAIN MANAGEMENT PROCEDURE Left 07/28/2020    LEFT L3 4 5 MEDIAL NERVE BRANCH RADIOFREQUENCY ABLATION performed by DO [facility]    PAIN MANAGEMENT PROCEDURE Right 08/27/2020    RIGHT L3 4 5 MEDIAL NERVE BRANCH RADIOFREQUENCY

## 2024-09-09 ENCOUNTER — TELEPHONE (OUTPATIENT)
Dept: PAIN MANAGEMENT | Age: 49
End: 2024-09-09

## 2024-09-11 RX ORDER — ALBUTEROL SULFATE 90 UG/1
1 AEROSOL, METERED RESPIRATORY (INHALATION) EVERY 4 HOURS PRN
Qty: 1 EACH | Refills: 0 | Status: SHIPPED | OUTPATIENT
Start: 2024-09-11

## 2024-09-11 RX ORDER — MONTELUKAST SODIUM 10 MG/1
10 TABLET ORAL DAILY
Qty: 30 TABLET | Refills: 3 | Status: SHIPPED | OUTPATIENT
Start: 2024-09-11

## 2024-09-17 ENCOUNTER — APPOINTMENT (OUTPATIENT)
Dept: GENERAL RADIOLOGY | Age: 49
End: 2024-09-17
Attending: PAIN MEDICINE
Payer: MEDICAID

## 2024-09-17 ENCOUNTER — HOSPITAL ENCOUNTER (OUTPATIENT)
Age: 49
Setting detail: OUTPATIENT SURGERY
Discharge: HOME | End: 2024-09-17
Attending: PAIN MEDICINE | Admitting: PAIN MEDICINE
Payer: MEDICAID

## 2024-09-17 VITALS
SYSTOLIC BLOOD PRESSURE: 153 MMHG | HEART RATE: 92 BPM | BODY MASS INDEX: 33.32 KG/M2 | DIASTOLIC BLOOD PRESSURE: 91 MMHG | RESPIRATION RATE: 16 BRPM | OXYGEN SATURATION: 97 % | TEMPERATURE: 98 F | HEIGHT: 65 IN | WEIGHT: 200 LBS

## 2024-09-17 DIAGNOSIS — R52 PAIN: Primary | ICD-10-CM

## 2024-09-17 PROCEDURE — 2500000003 HC RX 250 WO HCPCS: Performed by: PAIN MEDICINE

## 2024-09-17 PROCEDURE — 7100000010 HC PHASE II RECOVERY - FIRST 15 MIN: Performed by: PAIN MEDICINE

## 2024-09-17 PROCEDURE — 7100000011 HC PHASE II RECOVERY - ADDTL 15 MIN: Performed by: PAIN MEDICINE

## 2024-09-17 PROCEDURE — 64636 DESTROY L/S FACET JNT ADDL: CPT | Performed by: PAIN MEDICINE

## 2024-09-17 PROCEDURE — 64635 DESTROY LUMB/SAC FACET JNT: CPT | Performed by: PAIN MEDICINE

## 2024-09-17 PROCEDURE — 6360000002 HC RX W HCPCS: Performed by: PAIN MEDICINE

## 2024-09-17 PROCEDURE — 3600000012 HC SURGERY LEVEL 2 ADDTL 15MIN: Performed by: PAIN MEDICINE

## 2024-09-17 PROCEDURE — 3600000002 HC SURGERY LEVEL 2 BASE: Performed by: PAIN MEDICINE

## 2024-09-17 PROCEDURE — 2709999900 HC NON-CHARGEABLE SUPPLY: Performed by: PAIN MEDICINE

## 2024-09-17 RX ORDER — BUPIVACAINE HYDROCHLORIDE 2.5 MG/ML
INJECTION, SOLUTION EPIDURAL; INFILTRATION; INTRACAUDAL PRN
Status: DISCONTINUED | OUTPATIENT
Start: 2024-09-17 | End: 2024-09-17 | Stop reason: ALTCHOICE

## 2024-09-17 RX ORDER — LIDOCAINE HYDROCHLORIDE 20 MG/ML
INJECTION, SOLUTION EPIDURAL; INFILTRATION; INTRACAUDAL; PERINEURAL PRN
Status: DISCONTINUED | OUTPATIENT
Start: 2024-09-17 | End: 2024-09-17 | Stop reason: ALTCHOICE

## 2024-09-17 RX ORDER — METHYLPREDNISOLONE ACETATE 40 MG/ML
INJECTION, SUSPENSION INTRA-ARTICULAR; INTRALESIONAL; INTRAMUSCULAR; SOFT TISSUE PRN
Status: DISCONTINUED | OUTPATIENT
Start: 2024-09-17 | End: 2024-09-17 | Stop reason: ALTCHOICE

## 2024-09-17 ASSESSMENT — PAIN DESCRIPTION - DESCRIPTORS
DESCRIPTORS: NUMBNESS
DESCRIPTORS: NUMBNESS
DESCRIPTORS: DISCOMFORT
DESCRIPTORS: DISCOMFORT

## 2024-09-17 ASSESSMENT — PAIN - FUNCTIONAL ASSESSMENT
PAIN_FUNCTIONAL_ASSESSMENT: 0-10

## 2025-02-17 RX ORDER — CETIRIZINE HYDROCHLORIDE 10 MG/1
10 TABLET ORAL DAILY
Qty: 30 TABLET | Refills: 5 | Status: SHIPPED | OUTPATIENT
Start: 2025-02-17

## 2025-02-17 RX ORDER — MONTELUKAST SODIUM 10 MG/1
10 TABLET ORAL DAILY
Qty: 30 TABLET | Refills: 3 | Status: SHIPPED | OUTPATIENT
Start: 2025-02-17

## 2025-02-25 ENCOUNTER — OFFICE VISIT (OUTPATIENT)
Dept: PRIMARY CARE CLINIC | Age: 50
End: 2025-02-25
Payer: MEDICAID

## 2025-02-25 VITALS
DIASTOLIC BLOOD PRESSURE: 80 MMHG | SYSTOLIC BLOOD PRESSURE: 128 MMHG | BODY MASS INDEX: 32.32 KG/M2 | RESPIRATION RATE: 18 BRPM | WEIGHT: 194 LBS | HEIGHT: 65 IN | TEMPERATURE: 97.8 F | OXYGEN SATURATION: 99 % | HEART RATE: 102 BPM

## 2025-02-25 DIAGNOSIS — F33.2 MDD (MAJOR DEPRESSIVE DISORDER), RECURRENT SEVERE, WITHOUT PSYCHOSIS (HCC): ICD-10-CM

## 2025-02-25 DIAGNOSIS — M06.09 RHEUMATOID ARTHRITIS OF MULTIPLE SITES WITH NEGATIVE RHEUMATOID FACTOR (HCC): ICD-10-CM

## 2025-02-25 DIAGNOSIS — Q79.60 EHLERS-DANLOS SYNDROME: ICD-10-CM

## 2025-02-25 DIAGNOSIS — E61.1 IRON DEFICIENCY: ICD-10-CM

## 2025-02-25 DIAGNOSIS — E55.9 VITAMIN D DEFICIENCY: ICD-10-CM

## 2025-02-25 DIAGNOSIS — R53.83 FATIGUE, UNSPECIFIED TYPE: ICD-10-CM

## 2025-02-25 DIAGNOSIS — G70.9 NEUROMUSCULAR DISEASE (HCC): ICD-10-CM

## 2025-02-25 DIAGNOSIS — I42.2 HYPERTROPHIC CARDIOMYOPATHY (HCC): ICD-10-CM

## 2025-02-25 DIAGNOSIS — I10 ESSENTIAL HYPERTENSION: Primary | ICD-10-CM

## 2025-02-25 DIAGNOSIS — M79.7 FIBROMYALGIA: ICD-10-CM

## 2025-02-25 DIAGNOSIS — G90.A POTS (POSTURAL ORTHOSTATIC TACHYCARDIA SYNDROME): ICD-10-CM

## 2025-02-25 PROBLEM — J45.20 MILD INTERMITTENT ASTHMA WITHOUT COMPLICATION: Status: RESOLVED | Noted: 2020-07-22 | Resolved: 2025-02-25

## 2025-02-25 PROBLEM — M79.10 MYALGIA: Status: RESOLVED | Noted: 2021-07-14 | Resolved: 2025-02-25

## 2025-02-25 PROBLEM — F41.1 ANXIETY, GENERALIZED: Status: RESOLVED | Noted: 2022-11-03 | Resolved: 2025-02-25

## 2025-02-25 PROBLEM — M47.817 LUMBOSACRAL SPONDYLOSIS WITHOUT MYELOPATHY: Status: RESOLVED | Noted: 2020-05-27 | Resolved: 2025-02-25

## 2025-02-25 PROCEDURE — 99214 OFFICE O/P EST MOD 30 MIN: CPT | Performed by: FAMILY MEDICINE

## 2025-02-25 PROCEDURE — 3079F DIAST BP 80-89 MM HG: CPT | Performed by: FAMILY MEDICINE

## 2025-02-25 PROCEDURE — 3074F SYST BP LT 130 MM HG: CPT | Performed by: FAMILY MEDICINE

## 2025-02-25 RX ORDER — LISDEXAMFETAMINE DIMESYLATE 40 MG/1
CAPSULE ORAL
COMMUNITY
Start: 2025-02-04

## 2025-02-25 RX ORDER — LISDEXAMFETAMINE DIMESYLATE 20 MG/1
CAPSULE ORAL
COMMUNITY
Start: 2025-02-04

## 2025-02-25 SDOH — ECONOMIC STABILITY: FOOD INSECURITY: WITHIN THE PAST 12 MONTHS, THE FOOD YOU BOUGHT JUST DIDN'T LAST AND YOU DIDN'T HAVE MONEY TO GET MORE.: NEVER TRUE

## 2025-02-25 SDOH — ECONOMIC STABILITY: FOOD INSECURITY: WITHIN THE PAST 12 MONTHS, YOU WORRIED THAT YOUR FOOD WOULD RUN OUT BEFORE YOU GOT MONEY TO BUY MORE.: NEVER TRUE

## 2025-02-25 ASSESSMENT — ENCOUNTER SYMPTOMS
CONSTIPATION: 0
SHORTNESS OF BREATH: 0
EYE REDNESS: 0
RHINORRHEA: 0
EYE ITCHING: 0
CHEST TIGHTNESS: 0
ORTHOPNEA: 0
COUGH: 0
SORE THROAT: 0
BLOOD IN STOOL: 0
WHEEZING: 0
DIARRHEA: 0
SINUS PRESSURE: 0
NAUSEA: 0
EYE PAIN: 0
ABDOMINAL PAIN: 0
COLOR CHANGE: 0
APNEA: 0
BACK PAIN: 0
VOMITING: 0

## 2025-02-25 ASSESSMENT — PATIENT HEALTH QUESTIONNAIRE - PHQ9
SUM OF ALL RESPONSES TO PHQ QUESTIONS 1-9: 0
SUM OF ALL RESPONSES TO PHQ QUESTIONS 1-9: 0
7. TROUBLE CONCENTRATING ON THINGS, SUCH AS READING THE NEWSPAPER OR WATCHING TELEVISION: NOT AT ALL
9. THOUGHTS THAT YOU WOULD BE BETTER OFF DEAD, OR OF HURTING YOURSELF: NOT AT ALL
SUM OF ALL RESPONSES TO PHQ9 QUESTIONS 1 & 2: 0
6. FEELING BAD ABOUT YOURSELF - OR THAT YOU ARE A FAILURE OR HAVE LET YOURSELF OR YOUR FAMILY DOWN: NOT AT ALL
SUM OF ALL RESPONSES TO PHQ QUESTIONS 1-9: 0
10. IF YOU CHECKED OFF ANY PROBLEMS, HOW DIFFICULT HAVE THESE PROBLEMS MADE IT FOR YOU TO DO YOUR WORK, TAKE CARE OF THINGS AT HOME, OR GET ALONG WITH OTHER PEOPLE: NOT DIFFICULT AT ALL
1. LITTLE INTEREST OR PLEASURE IN DOING THINGS: NOT AT ALL
2. FEELING DOWN, DEPRESSED OR HOPELESS: NOT AT ALL
3. TROUBLE FALLING OR STAYING ASLEEP: NOT AT ALL
SUM OF ALL RESPONSES TO PHQ QUESTIONS 1-9: 0
4. FEELING TIRED OR HAVING LITTLE ENERGY: NOT AT ALL
5. POOR APPETITE OR OVEREATING: NOT AT ALL
8. MOVING OR SPEAKING SO SLOWLY THAT OTHER PEOPLE COULD HAVE NOTICED. OR THE OPPOSITE, BEING SO FIGETY OR RESTLESS THAT YOU HAVE BEEN MOVING AROUND A LOT MORE THAN USUAL: NOT AT ALL

## 2025-02-25 NOTE — PROGRESS NOTES
There is no abdominal tenderness.   Musculoskeletal:         General: No tenderness. Normal range of motion.      Cervical back: Normal range of motion and neck supple. No rigidity. No muscular tenderness.   Lymphadenopathy:      Cervical: No cervical adenopathy.   Skin:     General: Skin is warm and dry.      Findings: No erythema or rash.   Neurological:      General: No focal deficit present.      Mental Status: She is alert and oriented to person, place, and time.      Cranial Nerves: No cranial nerve deficit.      Deep Tendon Reflexes: Reflexes are normal and symmetric. Reflexes normal.   Psychiatric:         Mood and Affect: Mood normal.                                 ASSESSMENT/PLAN:    Patient Active Problem List   Diagnosis    Fibromyalgia    Lumbar disc disorder    GERD (gastroesophageal reflux disease)    Rheumatoid arthritis ([facility])    MDD (major depressive disorder), recurrent severe, without psychosis ([facility])    Abnormality of gait and mobility    Migraine without status migrainosus, not intractable    Essential hypertension    Vitamin D deficiency    Irritable bowel syndrome with constipation    POTS (postural orthostatic tachycardia syndrome)    Malabsorption syndrome    Chronic pain syndrome    Chronic bilateral low back pain without sciatica    Cigarette nicotine dependence without complication    Neuromuscular disease (HCC)    Hypertrophic cardiomyopathy (HCC)    Christina-Danlos syndrome    Peanut allergy    Cervical (neck) region somatic dysfunction    Lumbar spondylosis    Other osteoporosis without current pathological fracture    DDD (degenerative disc disease), lumbar    Fatigue       Essential hypertension  Assessment & Plan:   Chronic, at goal (stable), continue current treatment plan and medication adherence emphasized  Neuromuscular disease ([facility])  Assessment & Plan:   Monitored by specialist- no acute findings meriting change in the plan  Hypertrophic cardiomyopathy (HCC)  Assessment & Plan:

## 2025-04-24 ENCOUNTER — OFFICE VISIT (OUTPATIENT)
Dept: PAIN MANAGEMENT | Age: 50
End: 2025-04-24
Payer: MEDICAID

## 2025-04-24 ENCOUNTER — PREP FOR PROCEDURE (OUTPATIENT)
Dept: PAIN MANAGEMENT | Age: 50
End: 2025-04-24

## 2025-04-24 VITALS
WEIGHT: 194 LBS | TEMPERATURE: 97.5 F | RESPIRATION RATE: 18 BRPM | BODY MASS INDEX: 32.32 KG/M2 | SYSTOLIC BLOOD PRESSURE: 151 MMHG | HEART RATE: 96 BPM | HEIGHT: 65 IN | DIASTOLIC BLOOD PRESSURE: 93 MMHG | OXYGEN SATURATION: 97 %

## 2025-04-24 DIAGNOSIS — M54.50 CHRONIC BILATERAL LOW BACK PAIN WITHOUT SCIATICA: ICD-10-CM

## 2025-04-24 DIAGNOSIS — G89.29 CHRONIC BILATERAL LOW BACK PAIN WITHOUT SCIATICA: ICD-10-CM

## 2025-04-24 DIAGNOSIS — M47.816 LUMBAR SPONDYLOSIS: Primary | ICD-10-CM

## 2025-04-24 DIAGNOSIS — M51.9 LUMBAR DISC DISORDER: ICD-10-CM

## 2025-04-24 DIAGNOSIS — G89.4 CHRONIC PAIN SYNDROME: ICD-10-CM

## 2025-04-24 DIAGNOSIS — M47.817 LUMBOSACRAL SPONDYLOSIS WITHOUT MYELOPATHY: ICD-10-CM

## 2025-04-24 DIAGNOSIS — M79.10 MYALGIA: ICD-10-CM

## 2025-04-24 DIAGNOSIS — M54.2 NECK PAIN: ICD-10-CM

## 2025-04-24 PROCEDURE — 3077F SYST BP >= 140 MM HG: CPT | Performed by: PHYSICIAN ASSISTANT

## 2025-04-24 PROCEDURE — 3080F DIAST BP >= 90 MM HG: CPT | Performed by: PHYSICIAN ASSISTANT

## 2025-04-24 PROCEDURE — 99213 OFFICE O/P EST LOW 20 MIN: CPT | Performed by: PHYSICIAN ASSISTANT

## 2025-04-24 RX ORDER — NALTREXONE HYDROCHLORIDE 50 MG/1
TABLET, FILM COATED ORAL
COMMUNITY
Start: 2025-02-10

## 2025-04-24 NOTE — PROGRESS NOTES
Mandi Mreino presents to the [facility] on 4/24/2025. Mandi is complaining of pain lower back, radiates to BLE. The pain is constant. The pain is described as aching, throbbing, burning, penetrating, and numb. Pain is rated on her best day at a 2, on her worst day at a 9, and on average at a 7 on the VAS scale. She took her last dose of  Medical Marijuana patch  current-RUE.      Any procedures since your last visit: Yes, with 85 % relief.    She is not on NSAIDS and  is not on anticoagulation medications  Pacemaker or defibrillator: No   Do you want someone present when the provider examines you? No    Medication Contract and Consent for Opioid Use Documents Filed        No documents found                       There were no vitals taken for this visit.     No LMP recorded. Patient has had a hysterectomy.  
96   Temp 97.5 °F (36.4 °C) (Infrared)   Resp 18   Ht 1.651 m (5' 5\")   Wt 88 kg (194 lb)   SpO2 97%   BMI 32.28 kg/m²     General:      General appearance:   pleasant and well-hydrated.   , in mild discomfort and A & O x3  Build:Overweight    HEENT:    Head:normocephalic and atraumatic  Sclera: icterus absent,    Lungs:    Breathing:Normal expansion. No wheezing.    Abdomen:    Shape:non-distended and normal    Lumbar spine:                + midline and paraspinal TTP              Negative SI joint TTP b/l              B/l SLR              Full strength 5/5 b/l  Range of motion:abnormal mildly Lateral bending, flexion, extension rotation bilateral and is  painful.    Extremities:    Tremors:None bilaterally upper and lower  Range of motion:Generally normal shoulders, pain with internal rotation of hips negative.  Intact:Yes  Edema:Normal    Dermatology:    Skin:no unusual rashes, no skin lesions, no palpable subcutaneous nodules, and good skin turgor    Impression:    LBP  Lumbar MRI shows DDD without sig central or foraminal stenosis  Referred by NSGY for procedures  Extensive PMHx - RLS, chronic migraines, seizures, rheumatoid arthritis, POTS, GERD, fibromyalgia, HTN, CHANNING, colitis, and a neuromuscular disorder  Hx SCS TRIAL for endometriosis which was complicated by a MRSA infection tx with PICC line abx  Has tried baclofen, lidocaine patches, and medical THC with moderate relief     Diagnosed with celiac by endoscopy    From last visit:  Patient reporting neck issues.  States that she feels some laxity in her neck due to Christina-Danlos syndrome.  Requesting a cervical flex/ext x-ray.  Ordered today.  She reports some radiation to her b/l shoulders.  Consider MRI cervical spine at a future point once she has recovered from her RFAs. She has not obtained the x-rays yet.         Plan:     Urine screen deferred  OARRS report reviewed  failed diclofenac gel due to GI upset  Right then left L3,4,5 RFA with 75%

## 2025-04-24 NOTE — H&P (VIEW-ONLY)
[facility]  [address] [place] [id number]    Follow up Note      Mandi Merino     Date of Visit:  4/24/2025    CC:  Patient presents for follow up   Chief Complaint   Patient presents with    Follow-up     9/17/24 RIGHT LUMBAR 3, LUMBAR 4, LUMBAR 5 RADIOFREQUENCY ABLATION       HPI:    Pain is worse to her lower back.    Appropriate analgesia with current medications regimen: n/a   Change in quality of symptoms:no.    Medication side effects:not applicable .   Recent diagnostic testing:none.   Recent interventional procedures:     09/03/2025 Fluoroscopic-guided Left  Lumbar facet medial branch radiofrequency ablation at levels L3,4,5 (anesthetizing the L4-5 and L5-S1 facet joints). - 80-90% x 5 months.      09/17/2025 Fluoroscopic-guided Right  Lumbar facet medial branch radiofrequency ablation at levels L3,4,5 (anesthetizing the L4-5 and L5-S1 facet joints) - 80-90% x 5 months.    She has not been on anticoagulation medications to include ASA, NSAIDS, Plavix, heparin, LMW heparin and warfarin. The patient  has not been on herbal supplements.  The patient is not diabetic.     Imaging:   MRI lumbar spine 2/20/20 (per NSGY notes)  \"degenerative disc disease with herniated disc at L4-5. No significant central canal or neuroforaminal stenosis noted.\" (pt states done at [facility])                                         Potential Aberrant Drug-Related Behavior:  None.     Urine Drug Screening:  N/a     OARRS report:  6/2022 - on medical THC  04/2025 consistent    Opioid Agreement:  Renewal date: n/a    Past Medical History:   Diagnosis Date    Anxiety     Anxiety, generalized 11/03/2022    Arthritis     Asthma exacerbation with COPD (chronic obstructive pulmonary disease) ([facility]) 2016    Celiac disease 12/10/2022    Diagnosis by Dr. Woodrow Padillalers-Danlos syndrome     connective tissue syndrome    Fibromyalgia     GERD (gastroesophageal reflux disease)     Lumbar spondylolysis

## 2025-05-07 ENCOUNTER — TELEPHONE (OUTPATIENT)
Dept: PAIN MANAGEMENT | Age: 50
End: 2025-05-07

## 2025-05-07 NOTE — TELEPHONE ENCOUNTER
Call to Mandi Merino that procedure was scheduled for 05/15/2025 and that [facility] should call her a few days before for the pre op call and between 2:00 PM and 4:00 PM  the business day before with the arrival time. Instructed Mandi to hold ibuprofen for 24 hours, Celebrex, Mobic, and naprosyn for 4 days and any aspirin containing products, CoQ 10, or fish oil for 7 days. Instructed to call office back if any questions. Mandi verbalized understanding.    Electronically signed by Soif Mittal RN on 5/7/2025 at 9:05 AM

## 2025-05-12 NOTE — PROGRESS NOTES
[facility] PRE-ADMISSION TESTING INSTRUCTIONS    The Preadmission Testing patient is instructed accordingly using the following criteria (check applicable):    ARRIVAL INSTRUCTIONS:  [x] Parking the day of Surgery is located in the Main Entrance lot.  Upon entering the door, make an immediate right to the surgery reception desk    [x] Bring photo ID and insurance card    [] Bring in a copy of Living will or Durable Power of  papers.    [x] Please be sure to arrange for responsible adult to provide transportation to and from the hospital    [x] Please arrange for responsible adult to be with you for the 24 hour period post procedure due to having anesthesia    [x] If you awake am of surgery not feeling well or have temperature >100 please call [phone]    GENERAL INSTRUCTIONS:    [x] May have clear liquids until 4 hours prior to surgery. Examples include water, fruit juices (no pulp), jello, popsicles, black coffee or tea, beef or chicken broth.               No gum, candy or mints.    [x] You may brush your teeth, but do not swallow any water    [x] Take medications as instructed with 1-2 oz of water (ATIVAN IF NEEDED. PAXIL, PROPRANOLOL, VYVANSE, TAGAMET AND DEXILANT)    [x] Stop herbal supplements and vitamins 5 days prior to procedure    [] Follow preop dosing of blood thinners per physician instructions    [] Take 1/2 dose of evening insulin, but no insulin after midnight    [] No oral diabetic medications after midnight    [] If diabetic and have low blood sugar or feel symptomatic, take 1-2oz apple juice only    [x] Bring inhalers day of surgery    [] Bring C-PAP/ Bi-Pap day of surgery    [] Bring urine specimen day of surgery    [x] Shower or bath with soap, lather and rinse well, AM of Surgery, no lotion, powders or creams to surgical site    [] Follow bowel prep as instructed per surgeon    [x] No tobacco products within 24 hours of surgery     [x] No alcohol or

## 2025-05-15 ENCOUNTER — HOSPITAL ENCOUNTER (OUTPATIENT)
Age: 50
Setting detail: OUTPATIENT SURGERY
Discharge: HOME | End: 2025-05-15
Attending: ANESTHESIOLOGY | Admitting: ANESTHESIOLOGY
Payer: MEDICAID

## 2025-05-15 ENCOUNTER — ANESTHESIA (OUTPATIENT)
Dept: OPERATING ROOM | Age: 50
End: 2025-05-15
Payer: MEDICAID

## 2025-05-15 ENCOUNTER — HOSPITAL ENCOUNTER (OUTPATIENT)
Dept: GENERAL RADIOLOGY | Age: 50
Setting detail: OUTPATIENT SURGERY
Discharge: HOME | End: 2025-05-17
Attending: ANESTHESIOLOGY
Payer: MEDICAID

## 2025-05-15 ENCOUNTER — ANESTHESIA EVENT (OUTPATIENT)
Dept: OPERATING ROOM | Age: 50
End: 2025-05-15
Payer: MEDICAID

## 2025-05-15 VITALS
WEIGHT: 198 LBS | SYSTOLIC BLOOD PRESSURE: 132 MMHG | TEMPERATURE: 97.4 F | BODY MASS INDEX: 32.99 KG/M2 | HEART RATE: 84 BPM | RESPIRATION RATE: 16 BRPM | OXYGEN SATURATION: 93 % | HEIGHT: 65 IN | DIASTOLIC BLOOD PRESSURE: 89 MMHG

## 2025-05-15 DIAGNOSIS — R52 PAIN MANAGEMENT: ICD-10-CM

## 2025-05-15 PROCEDURE — 7100000011 HC PHASE II RECOVERY - ADDTL 15 MIN: Performed by: ANESTHESIOLOGY

## 2025-05-15 PROCEDURE — 6360000002 HC RX W HCPCS: Performed by: ANESTHESIOLOGY

## 2025-05-15 PROCEDURE — 2709999900 HC NON-CHARGEABLE SUPPLY: Performed by: ANESTHESIOLOGY

## 2025-05-15 PROCEDURE — 6360000002 HC RX W HCPCS

## 2025-05-15 PROCEDURE — 3700000000 HC ANESTHESIA ATTENDED CARE: Performed by: ANESTHESIOLOGY

## 2025-05-15 PROCEDURE — 3700000001 HC ADD 15 MINUTES (ANESTHESIA): Performed by: ANESTHESIOLOGY

## 2025-05-15 PROCEDURE — 3600000002 HC SURGERY LEVEL 2 BASE: Performed by: ANESTHESIOLOGY

## 2025-05-15 PROCEDURE — 3600000012 HC SURGERY LEVEL 2 ADDTL 15MIN: Performed by: ANESTHESIOLOGY

## 2025-05-15 PROCEDURE — 7100000010 HC PHASE II RECOVERY - FIRST 15 MIN: Performed by: ANESTHESIOLOGY

## 2025-05-15 PROCEDURE — 2580000003 HC RX 258

## 2025-05-15 RX ORDER — LIDOCAINE HYDROCHLORIDE 5 MG/ML
INJECTION, SOLUTION INFILTRATION; INTRAVENOUS PRN
Status: DISCONTINUED | OUTPATIENT
Start: 2025-05-15 | End: 2025-05-15 | Stop reason: ALTCHOICE

## 2025-05-15 RX ORDER — SODIUM CHLORIDE 9 MG/ML
INJECTION, SOLUTION INTRAVENOUS
Status: DISCONTINUED | OUTPATIENT
Start: 2025-05-15 | End: 2025-05-15 | Stop reason: SDUPTHER

## 2025-05-15 RX ORDER — METHYLPREDNISOLONE ACETATE 40 MG/ML
INJECTION, SUSPENSION INTRA-ARTICULAR; INTRALESIONAL; INTRAMUSCULAR; SOFT TISSUE PRN
Status: DISCONTINUED | OUTPATIENT
Start: 2025-05-15 | End: 2025-05-15 | Stop reason: ALTCHOICE

## 2025-05-15 RX ORDER — MIDAZOLAM HYDROCHLORIDE 1 MG/ML
INJECTION, SOLUTION INTRAMUSCULAR; INTRAVENOUS
Status: DISCONTINUED | OUTPATIENT
Start: 2025-05-15 | End: 2025-05-15 | Stop reason: SDUPTHER

## 2025-05-15 RX ORDER — FENTANYL CITRATE 50 UG/ML
INJECTION, SOLUTION INTRAMUSCULAR; INTRAVENOUS
Status: DISCONTINUED | OUTPATIENT
Start: 2025-05-15 | End: 2025-05-15 | Stop reason: SDUPTHER

## 2025-05-15 RX ORDER — LIDOCAINE HYDROCHLORIDE 10 MG/ML
INJECTION, SOLUTION EPIDURAL; INFILTRATION; INTRACAUDAL; PERINEURAL PRN
Status: DISCONTINUED | OUTPATIENT
Start: 2025-05-15 | End: 2025-05-15 | Stop reason: ALTCHOICE

## 2025-05-15 RX ORDER — BUPIVACAINE HYDROCHLORIDE 2.5 MG/ML
INJECTION, SOLUTION EPIDURAL; INFILTRATION; INTRACAUDAL; PERINEURAL PRN
Status: DISCONTINUED | OUTPATIENT
Start: 2025-05-15 | End: 2025-05-15 | Stop reason: ALTCHOICE

## 2025-05-15 RX ADMIN — FENTANYL CITRATE 100 MCG: 50 INJECTION, SOLUTION INTRAMUSCULAR; INTRAVENOUS at 11:55

## 2025-05-15 RX ADMIN — MIDAZOLAM 2 MG: 1 INJECTION INTRAMUSCULAR; INTRAVENOUS at 12:00

## 2025-05-15 RX ADMIN — SODIUM CHLORIDE: 9 INJECTION, SOLUTION INTRAVENOUS at 11:17

## 2025-05-15 RX ADMIN — MIDAZOLAM 2 MG: 1 INJECTION INTRAMUSCULAR; INTRAVENOUS at 11:55

## 2025-05-15 RX ADMIN — FENTANYL CITRATE 100 MCG: 50 INJECTION, SOLUTION INTRAMUSCULAR; INTRAVENOUS at 12:00

## 2025-05-15 ASSESSMENT — PAIN - FUNCTIONAL ASSESSMENT
PAIN_FUNCTIONAL_ASSESSMENT: 0-10
PAIN_FUNCTIONAL_ASSESSMENT: NONE - DENIES PAIN
PAIN_FUNCTIONAL_ASSESSMENT: PREVENTS OR INTERFERES SOME ACTIVE ACTIVITIES AND ADLS
PAIN_FUNCTIONAL_ASSESSMENT: 0-10

## 2025-05-15 ASSESSMENT — PAIN DESCRIPTION - DESCRIPTORS: DESCRIPTORS: ACHING;DISCOMFORT;SORE

## 2025-05-15 ASSESSMENT — COPD QUESTIONNAIRES: CAT_SEVERITY: MILD

## 2025-05-15 ASSESSMENT — LIFESTYLE VARIABLES: SMOKING_STATUS: 1

## 2025-05-15 NOTE — OP NOTE
Operative Note      Patient: Mandi Merino  YOB: 1975  MRN: [id number]    Date of Procedure: 5/15/2025    Pre-Op Diagnosis Codes:      * Lumbar spondylosis [M47.816]    Post-Op Diagnosis: Same       Procedure(s):  FLUOROSCOPIC-GUIDED BILATERAL LUMBAR FACET MEDIAL BRANCH RADIOFREQUENCY ABLATION AT LEVELS LUMBAR 3, LUMBAR 4, LUMBAR 5    Surgeon(s):  Jamison Cheek MD    Assistant:   * No surgical staff found *    Anesthesia: Monitor Anesthesia Care    Estimated Blood Loss (mL): Minimal    Complications: None    Specimens:   * No specimens in log *    Implants:  * No implants in log *      Drains: * No LDAs found *    Findings:  Infection Present At Time Of Surgery (PATOS) (choose all levels that have infection present):  No infection present  Other Findings: good needle placement    Detailed Description of Procedure:   5/15/2025    Patient: Mandi Merino  :  1975  Age:  49 y.o.  Sex:  female     PRE-OPERATIVE DIAGNOSIS: Bilateral Lumbar spondylosis, lumbar facet arthropathy.    POST-OPERATIVE DIAGNOSIS: Same.    PROCEDURE:  Fluoroscopic-guided Bilateral  Lumbar facet medial branch radiofrequency ablation at levels L3, L4, L5     SURGEON:  Jamison Cheek MD    ANESTHESIA: MAC    ESTIMATED BLOOD LOSS: None.  ______________________________________________________________________  HISTORY & PHYSICAL: Mandi Merino presents today for fluoroscopic-guided Bilateral lumbar facet medial branch radiofrequency ablation at levels L3, L4, L5  The potential complications of the procedure were explained to Mandi again today and she has elected to undergo the aforementioned procedure.    Mandi’s complete History & Physical examination were reviewed in depth, a copy of which is in the chart.      DESCRIPTION OF PROCEDURE:    After confirming written and informed consent, a time-out was performed and Mandi’s name and date of birth, the procedure to be performed as well as the plan for the

## 2025-05-15 NOTE — ANESTHESIA PRE PROCEDURE
Department of Anesthesiology  Preprocedure Note       Name:  Mandi Merino   Age:  49 y.o.  :  1975                                          MRN:  [id number]         Date:  5/15/2025      Surgeon: Surgeon(s):  Jamison Cheek MD    Procedure: Procedure(s):  FLUOROSCOPIC-GUIDED BILATERAL LUMBAR FACET MEDIAL BRANCH RADIOFREQUENCY ABLATION AT LEVELS LUMBAR 3, LUMBAR 4, LUMBAR 5    Medications prior to admission:   Prior to Admission medications    Medication Sig Start Date End Date Taking? Authorizing Provider   Levomefolate Glucosamine (METHYLFOLATE PO) Take by mouth daily   Yes Jeni Rowley MD   vitamin D (ERGOCALCIFEROL) 1.25 MG (50349 UT) CAPS capsule Take 1 tablet on Monday and 1 tablet on 23  Yes James Nuno APRN - CNS   calcium carbonate (CALCIUM 600) 600 MG TABS tablet Take 2 tablets by mouth daily 23  Yes James Nuno APRN - CNS   VYVANSE 40 MG CAPS TAKE 1 CAPSULE BY MOUTH ONCE A DAY IN THE MORNING FOR 30 DAYS. 25   Jeni Rowley MD   VYVANSE 20 MG CAPS TAKE 1 CAPSULE BY MOUTH ONCE A DAY IN THE MORNING FOR 30 DAYS. 25   Jeni Rowley MD   montelukast (SINGULAIR) 10 MG tablet Take 1 tablet by mouth daily 25   Jignesh Marcano DO   cetirizine (ZYRTEC) 10 MG tablet Take 1 tablet by mouth daily 25   Jignesh Marcano DO   albuterol sulfate HFA (VENTOLIN HFA) 108 (90 Base) MCG/ACT inhaler Inhale 1 puff into the lungs every 4 hours as needed for Wheezing 24   Jignesh Marcano DO   medical marijuana Take by mouth as needed.    Jeni Rowley MD   albuterol (PROVENTIL) (2.5 MG/3ML) 0.083% nebulizer solution Take 3 mLs by nebulization every 6 hours as needed for Wheezing  Patient not taking: Reported on 2025   Jason Aguila III, PA   cimetidine (TAGAMET) 300 MG tablet Take 1 tablet by mouth 2 times daily 23   Jeni Rowley MD   dexlansoprazole (DEXILANT) 60 MG CPDR delayed release capsule

## 2025-05-15 NOTE — INTERVAL H&P NOTE
Update History & Physical    The patient's History and Physical of April 24, 2025 was reviewed with the patient and I examined the patient. There was no change. The surgical site was confirmed by the patient and me.     Plan: The risks, benefits, expected outcome, and alternative to the recommended procedure have been discussed with the patient. Patient understands and wants to proceed with the procedure.     Electronically signed by Jamison Cheek MD on 5/15/2025

## 2025-05-15 NOTE — ANESTHESIA POSTPROCEDURE EVALUATION
Department of Anesthesiology  Postprocedure Note    Patient: Mandi Merino  MRN: [id number]  YOB: 1975  Date of evaluation: 5/15/2025    Procedure Summary       Date: 05/15/25 Room / Location: [facility] PROCEDURE ROOM 02 / [facility]    Anesthesia Start: 1151 Anesthesia Stop: 1215    Procedure: FLUOROSCOPIC-GUIDED BILATERAL LUMBAR FACET MEDIAL BRANCH RADIOFREQUENCY ABLATION AT LEVELS LUMBAR 3, LUMBAR 4, LUMBAR 5 (Bilateral: Spine Lumbar) Diagnosis:       Lumbar spondylosis      (Lumbar spondylosis [M47.816])    Surgeons: Jamison Cheek MD Responsible Provider: Milton Frederick DO    Anesthesia Type: MAC ASA Status: 3            Anesthesia Type: MAC    Chris Phase I: Chris Score: 10    Chris Phase II:      Anesthesia Post Evaluation    Patient location during evaluation: bedside  Patient participation: complete - patient participated  Level of consciousness: awake and alert  Pain score: 0  Airway patency: patent  Nausea & Vomiting: no nausea and no vomiting  Cardiovascular status: hemodynamically stable  Respiratory status: acceptable and room air  Hydration status: stable  Pain management: adequate        No notable events documented.

## 2025-05-15 NOTE — DISCHARGE INSTRUCTIONS
[facility] Pain Management Department  [place]-[phone]  Dr. Adia Torres   Post-Pain Block/Radiofrequency  Home Going Instructions    1-Go home, rest for the remainder of the day  2-Please do not lift over 20 pounds the day of the injection  3-If you received sedation No: alcohol, driving, operating lawn mowers, plows, tractors or other dangerous equipment until next morning. Do not make important decisions or sign legal documents for 24 hours. You may experience light headedness, dizziness, nausea or sleepiness after sedation. Do not stay alone. A responsible adult must be with you for 24 hours. You could be nauseated from the medications you have received. Your IV site may be sore and bruised.    4-No dietary restrictions     5-Resume all medications the same day, blood thinners to be resumed 24 hours after injection if you were instructed to stop any.    6-Keep the surgical site clean and dry, you may shower the next morning and remove the      dressing.     7- No sitz baths, tub baths or hot tubs/swimming for 24 hours.       8- If you have any pain at the injection site(s), application of an ice pack to the area should be       helpful, 20 minutes on/20 minutes off for next 48 hours.  9- Call [facility] immediately at if you develop.  Fever greater than 100.4 F  Have bleeding or drainage from the puncture site  Have progressive Leg/arm numbness and or weakness  Loss of control of bowel and or bladder (wet/soil yourself)  Severe headache with inability to lift head  10-You may return to work the next [facility]  [place]-[phone]  Dr. Adia Torres   Post-Pain Block/Radiofrequency  Home Going Instructions    1-Go home, rest for the remainder of the day  2-Please do not lift over 20 pounds the day of the injection  3-If you received sedation No: alcohol, driving, operating lawn mowers, plows, tractors or other dangerous equipment

## 2025-05-16 ENCOUNTER — OFFICE VISIT (OUTPATIENT)
Dept: PRIMARY CARE CLINIC | Age: 50
End: 2025-05-16
Payer: MEDICAID

## 2025-05-16 VITALS
TEMPERATURE: 97.7 F | OXYGEN SATURATION: 98 % | DIASTOLIC BLOOD PRESSURE: 74 MMHG | WEIGHT: 198 LBS | RESPIRATION RATE: 18 BRPM | HEIGHT: 65 IN | BODY MASS INDEX: 32.99 KG/M2 | HEART RATE: 77 BPM | SYSTOLIC BLOOD PRESSURE: 130 MMHG

## 2025-05-16 DIAGNOSIS — G70.9 NEUROMUSCULAR DISEASE (HCC): ICD-10-CM

## 2025-05-16 DIAGNOSIS — E61.1 IRON DEFICIENCY: ICD-10-CM

## 2025-05-16 DIAGNOSIS — E55.9 VITAMIN D DEFICIENCY: ICD-10-CM

## 2025-05-16 DIAGNOSIS — R53.83 FATIGUE, UNSPECIFIED TYPE: ICD-10-CM

## 2025-05-16 DIAGNOSIS — M81.8 OTHER OSTEOPOROSIS WITHOUT CURRENT PATHOLOGICAL FRACTURE: ICD-10-CM

## 2025-05-16 DIAGNOSIS — G90.A POTS (POSTURAL ORTHOSTATIC TACHYCARDIA SYNDROME): ICD-10-CM

## 2025-05-16 DIAGNOSIS — E53.8 FOLATE DEFICIENCY: ICD-10-CM

## 2025-05-16 DIAGNOSIS — Q79.60 EHLERS-DANLOS SYNDROME: ICD-10-CM

## 2025-05-16 DIAGNOSIS — Z12.31 ENCOUNTER FOR SCREENING MAMMOGRAM FOR MALIGNANT NEOPLASM OF BREAST: ICD-10-CM

## 2025-05-16 DIAGNOSIS — G43.909 MIGRAINE WITHOUT STATUS MIGRAINOSUS, NOT INTRACTABLE, UNSPECIFIED MIGRAINE TYPE: ICD-10-CM

## 2025-05-16 DIAGNOSIS — I10 ESSENTIAL HYPERTENSION: Primary | ICD-10-CM

## 2025-05-16 DIAGNOSIS — M79.7 FIBROMYALGIA: ICD-10-CM

## 2025-05-16 DIAGNOSIS — I10 ESSENTIAL HYPERTENSION: ICD-10-CM

## 2025-05-16 DIAGNOSIS — M06.09 RHEUMATOID ARTHRITIS OF MULTIPLE SITES WITH NEGATIVE RHEUMATOID FACTOR (HCC): ICD-10-CM

## 2025-05-16 DIAGNOSIS — M51.360 DEGENERATION OF INTERVERTEBRAL DISC OF LUMBAR REGION WITH DISCOGENIC BACK PAIN: ICD-10-CM

## 2025-05-16 DIAGNOSIS — M51.9 LUMBAR DISC DISORDER: ICD-10-CM

## 2025-05-16 LAB
ALBUMIN: 4.2 G/DL (ref 3.5–5.2)
ALP BLD-CCNC: 82 U/L (ref 35–104)
ALT SERPL-CCNC: 27 U/L (ref 0–35)
ANION GAP SERPL CALCULATED.3IONS-SCNC: 13 MMOL/L (ref 7–16)
AST SERPL-CCNC: 32 U/L (ref 0–35)
BILIRUB SERPL-MCNC: 0.2 MG/DL (ref 0–1.2)
BUN BLDV-MCNC: 9 MG/DL (ref 6–20)
CALCIUM SERPL-MCNC: 9.5 MG/DL (ref 8.6–10)
CHLORIDE BLD-SCNC: 108 MMOL/L (ref 98–107)
CO2: 21 MMOL/L (ref 22–29)
CREAT SERPL-MCNC: 1 MG/DL (ref 0.5–1)
FERRITIN: 314 NG/ML
FOLATE: 11.6 NG/ML (ref 4.6–34.8)
GFR, ESTIMATED: 70 ML/MIN/1.73M2
GLUCOSE BLD-MCNC: 96 MG/DL (ref 74–99)
HCT VFR BLD CALC: 41.6 % (ref 34–48)
HEMOGLOBIN: 13.7 G/DL (ref 11.5–15.5)
IRON % SATURATION: 34 % (ref 15–50)
IRON: 99 UG/DL (ref 37–145)
MCH RBC QN AUTO: 31.6 PG (ref 26–35)
MCHC RBC AUTO-ENTMCNC: 32.9 G/DL (ref 32–34.5)
MCV RBC AUTO: 95.9 FL (ref 80–99.9)
PDW BLD-RTO: 19 % (ref 11.5–15)
PLATELET # BLD: 325 K/UL (ref 130–450)
PMV BLD AUTO: 10.3 FL (ref 7–12)
POTASSIUM SERPL-SCNC: 4.5 MMOL/L (ref 3.5–5.1)
RBC # BLD: 4.34 M/UL (ref 3.5–5.5)
SODIUM BLD-SCNC: 142 MMOL/L (ref 136–145)
TOTAL IRON BINDING CAPACITY: 290 UG/DL (ref 250–450)
TOTAL PROTEIN: 7.1 G/DL (ref 6.4–8.3)
TSH SERPL DL<=0.05 MIU/L-ACNC: 0.68 UIU/ML (ref 0.27–4.2)
VITAMIN B-12: 1216 PG/ML (ref 232–1245)
VITAMIN D 25-HYDROXY: 40.3 NG/ML (ref 30–100)
WBC # BLD: 8.4 K/UL (ref 4.5–11.5)

## 2025-05-16 PROCEDURE — 99214 OFFICE O/P EST MOD 30 MIN: CPT | Performed by: FAMILY MEDICINE

## 2025-05-16 PROCEDURE — 3078F DIAST BP <80 MM HG: CPT | Performed by: FAMILY MEDICINE

## 2025-05-16 PROCEDURE — 3075F SYST BP GE 130 - 139MM HG: CPT | Performed by: FAMILY MEDICINE

## 2025-05-16 ASSESSMENT — ENCOUNTER SYMPTOMS
WHEEZING: 0
SORE THROAT: 0
NAUSEA: 0
APNEA: 0
EYE ITCHING: 0
RHINORRHEA: 0
VOMITING: 0
EYE PAIN: 0
COLOR CHANGE: 0
ABDOMINAL PAIN: 0
SHORTNESS OF BREATH: 0
ORTHOPNEA: 0
COUGH: 0
BLOOD IN STOOL: 0
CONSTIPATION: 0
DIARRHEA: 0
BACK PAIN: 0
EYE REDNESS: 0
CHEST TIGHTNESS: 0
SINUS PRESSURE: 0

## 2025-05-16 NOTE — PROGRESS NOTES
Chief Complaint:     Chief Complaint   Patient presents with    Migraine     Ask about medication device for migraines.     Check-Up     Mammogram order.          Migraine   This is a chronic problem. The current episode started more than 1 year ago. The problem occurs intermittently. The problem has been waxing and waning. The pain quality is not similar to prior headaches. The quality of the pain is described as aching and throbbing. The pain is severe. Associated symptoms include weakness. Pertinent negatives include no abdominal pain, back pain, coughing, dizziness, ear pain, eye pain, eye redness, fever, hearing loss, nausea, neck pain, numbness, rhinorrhea, sinus pressure, sore throat or vomiting.   Fatigue  This is a chronic problem. The current episode started more than 1 month ago. The problem occurs daily. The problem has been waxing and waning. Associated symptoms include arthralgias, fatigue, myalgias and weakness. Pertinent negatives include no abdominal pain, chest pain, congestion, coughing, diaphoresis, fever, headaches, nausea, neck pain, numbness, rash, sore throat or vomiting. Nothing aggravates the symptoms. She has tried nothing for the symptoms. The treatment provided no relief.   Hypertension  This is a chronic problem. The current episode started more than 1 month ago. The problem is unchanged. The problem is controlled. Associated symptoms include malaise/fatigue. Pertinent negatives include no chest pain, headaches, neck pain, orthopnea, palpitations, peripheral edema, PND or shortness of breath. There are no associated agents to hypertension. There are no known risk factors for coronary artery disease. Past treatments include beta blockers. The current treatment provides significant improvement. There is no history of CAD/MI, CVA or PVD. There is no history of a hypertension causing med, pheochromocytoma, renovascular disease, sleep apnea or a thyroid problem.   Generalized Body

## 2025-05-18 ENCOUNTER — RESULTS FOLLOW-UP (OUTPATIENT)
Dept: PRIMARY CARE CLINIC | Age: 50
End: 2025-05-18

## 2025-05-20 ENCOUNTER — TELEPHONE (OUTPATIENT)
Dept: PRIMARY CARE CLINIC | Age: 50
End: 2025-05-20

## 2025-05-20 RX ORDER — ERGOCALCIFEROL 1.25 MG/1
CAPSULE, LIQUID FILLED ORAL
Qty: 24 CAPSULE | Refills: 1 | Status: SHIPPED | OUTPATIENT
Start: 2025-05-20

## 2025-06-30 RX ORDER — MONTELUKAST SODIUM 10 MG/1
10 TABLET ORAL DAILY
Qty: 30 TABLET | Refills: 3 | Status: SHIPPED | OUTPATIENT
Start: 2025-06-30

## 2025-07-31 ENCOUNTER — OFFICE VISIT (OUTPATIENT)
Dept: PAIN MANAGEMENT | Age: 50
End: 2025-07-31
Payer: MEDICAID

## 2025-07-31 VITALS
HEIGHT: 65 IN | BODY MASS INDEX: 32.99 KG/M2 | TEMPERATURE: 97.2 F | HEART RATE: 95 BPM | WEIGHT: 198 LBS | SYSTOLIC BLOOD PRESSURE: 131 MMHG | RESPIRATION RATE: 16 BRPM | DIASTOLIC BLOOD PRESSURE: 88 MMHG | OXYGEN SATURATION: 98 %

## 2025-07-31 DIAGNOSIS — M51.369 DEGENERATION OF INTERVERTEBRAL DISC OF LUMBAR REGION, UNSPECIFIED WHETHER PAIN PRESENT: ICD-10-CM

## 2025-07-31 DIAGNOSIS — M51.9 LUMBAR DISC DISORDER: ICD-10-CM

## 2025-07-31 DIAGNOSIS — M47.817 LUMBOSACRAL SPONDYLOSIS WITHOUT MYELOPATHY: ICD-10-CM

## 2025-07-31 DIAGNOSIS — M47.816 LUMBAR SPONDYLOSIS: Primary | ICD-10-CM

## 2025-07-31 DIAGNOSIS — M54.50 CHRONIC BILATERAL LOW BACK PAIN WITHOUT SCIATICA: ICD-10-CM

## 2025-07-31 DIAGNOSIS — M79.10 MYALGIA: ICD-10-CM

## 2025-07-31 DIAGNOSIS — M54.2 NECK PAIN: ICD-10-CM

## 2025-07-31 DIAGNOSIS — G89.29 CHRONIC BILATERAL LOW BACK PAIN WITHOUT SCIATICA: ICD-10-CM

## 2025-07-31 DIAGNOSIS — G89.4 CHRONIC PAIN SYNDROME: ICD-10-CM

## 2025-07-31 PROCEDURE — 3079F DIAST BP 80-89 MM HG: CPT | Performed by: PHYSICIAN ASSISTANT

## 2025-07-31 PROCEDURE — 99213 OFFICE O/P EST LOW 20 MIN: CPT | Performed by: PHYSICIAN ASSISTANT

## 2025-07-31 PROCEDURE — 3075F SYST BP GE 130 - 139MM HG: CPT | Performed by: PHYSICIAN ASSISTANT

## 2025-07-31 NOTE — PROGRESS NOTES
[facility]  [address] [place] [id number]    Follow up Note      Mandi Merino     Date of Visit:  7/31/2025    CC:  Patient presents for follow up   Chief Complaint   Patient presents with    Follow-up     FLUOROSCOPIC-GUIDED BILATERAL LUMBAR FACET MEDIAL BRANCH RADIOFREQUENCY ABLATION AT LEVELS LUMBAR 3, LUMBAR 4, LUMBAR 5       HPI:    Pain is somewhat better  Appropriate analgesia with current medications regimen: n/a   Change in quality of symptoms:no.    Medication side effects:not applicable .   Recent diagnostic testing:none.   Recent interventional procedures: 05/15/2025 FLUOROSCOPIC-GUIDED BILATERAL LUMBAR FACET MEDIAL BRANCH RADIOFREQUENCY ABLATION AT LEVELS LUMBAR 3, LUMBAR 4, LUMBAR 5 - 80% relief except for left lower back around L4.           She has not been on anticoagulation medications to include ASA, NSAIDS, Plavix, heparin, LMW heparin and warfarin. The patient  has not been on herbal supplements.  The patient is not diabetic.     Imaging:   MRI lumbar spine 2/20/20 (per NSGY notes)  \"degenerative disc disease with herniated disc at L4-5. No significant central canal or neuroforaminal stenosis noted.\" (pt states done at [facility])                                         Potential Aberrant Drug-Related Behavior:  None.     Urine Drug Screening:  N/a     OARRS report:  6/2022 - on medical [facility]  04/2025 to 07/2025 consistent    Opioid Agreement:  Renewal date: n/a    Past Medical History:   Diagnosis Date    Anxiety     Anxiety, generalized 11/03/2022    Arthritis     Asthma exacerbation with COPD (chronic obstructive pulmonary disease) ([facility]) 2016    Celiac disease 12/10/2022    Diagnosis by Dr. Troy    Christina-Danlos syndrome     connective tissue syndrome    Fibromyalgia     GERD (gastroesophageal reflux disease)     Lumbar spondylolysis     Major depression     Malabsorption syndrome     POTS (postural orthostatic tachycardia syndrome)     PTSD

## 2025-07-31 NOTE — PROGRESS NOTES
Mandi Merino presents to the [facility] on 7/31/2025. Mandi is complaining of pain lower back. The pain is intermittent. The pain is described as throbbing, shooting, and sharp. Pain is rated on her best day at a 3, on her worst day at a 8, and on average at a 5 on the VAS scale.     Any procedures since your last visit: Yes, with 85 % relief.    Pacemaker or defibrillator: No     She is not on NSAIDS and is not on anticoagulation medications     Do you want someone present when the provider examines you? No    Medication Contract and Consent for Opioid Use Documents Filed        No documents found                    /88   Pulse 95   Temp 97.2 °F (36.2 °C) (Infrared)   Resp 16   Ht 1.651 m (5' 5\")   Wt 89.8 kg (198 lb)   SpO2 98%   BMI 32.95 kg/m²      No LMP recorded. Patient has had a hysterectomy.

## 2025-09-05 RX ORDER — CETIRIZINE HYDROCHLORIDE 10 MG/1
10 TABLET ORAL DAILY
Qty: 30 TABLET | Refills: 5 | Status: SHIPPED | OUTPATIENT
Start: 2025-09-05

## (undated) DEVICE — NEEDLE HYPO 18GA L1.5IN PNK POLYPR HUB S STL THN WALL FILL

## (undated) DEVICE — 3M™ RED DOT™ MONITORING ELECTRODE WITH FOAM TAPE AND STICKY GEL 2560, 50/BAG, 20/CASE, 72/PLT: Brand: RED DOT™

## (undated) DEVICE — NON-DEHP CATHETER EXTENSION SET, MALE LUER LOCK ADAPTER

## (undated) DEVICE — GLOVE ORANGE PI 7 1/2   MSG9075

## (undated) DEVICE — GAUZE,SPONGE,4"X4",8PLY,STRL,LF,10/TRAY: Brand: MEDLINE

## (undated) DEVICE — Z DISCONTINUED APPLICATOR SURG PREP 0.35OZ 2% CHG 70% ISO ALC W/ HI LT

## (undated) DEVICE — BANDAGE ADH W1XL3IN NAT FAB WVN FLX DURABLE N ADH PD SEAL

## (undated) DEVICE — NEEDLE HYPO 25GA L1.5IN BLU POLYPR HUB S STL REG BVL STR

## (undated) DEVICE — 6 ML SYRINGE LUER-LOCK TIP: Brand: MONOJECT

## (undated) DEVICE — SYRINGE, LUER LOCK, 5ML: Brand: MEDLINE

## (undated) DEVICE — 12 ML SYRINGE,LUER-LOCK TIP: Brand: MONOJECT

## (undated) DEVICE — NEEDLE HYPO 18GA L1.5IN PNK POLYPR HUB S STL REG BVL STR

## (undated) DEVICE — Device: Brand: PORTEX

## (undated) DEVICE — GAUZE,SPONGE,4"X4",12PLY,STERILE,LF,2'S: Brand: MEDLINE

## (undated) DEVICE — SYRINGE MED 5ML STD CLR PLAS LUERLOCK TIP N CTRL DISP